# Patient Record
Sex: FEMALE | Race: WHITE | NOT HISPANIC OR LATINO | Employment: OTHER | URBAN - METROPOLITAN AREA
[De-identification: names, ages, dates, MRNs, and addresses within clinical notes are randomized per-mention and may not be internally consistent; named-entity substitution may affect disease eponyms.]

---

## 2017-05-31 ENCOUNTER — CONVERSION ENCOUNTER (OUTPATIENT)
Dept: GENERAL RADIOLOGY | Facility: HOSPITAL | Age: 62
End: 2017-05-31

## 2018-06-27 ENCOUNTER — CONVERSION ENCOUNTER (OUTPATIENT)
Dept: GENERAL RADIOLOGY | Facility: HOSPITAL | Age: 63
End: 2018-06-27

## 2019-02-22 ENCOUNTER — HOSPITAL ENCOUNTER (OUTPATIENT)
Dept: LAB | Facility: HOSPITAL | Age: 64
Discharge: HOME | End: 2019-02-22
Attending: INTERNAL MEDICINE

## 2019-02-22 LAB
25(OH)D3 SERPL-MCNC: 41.5 NG/ML (ref 30–100)
ALBUMIN SERPL-MCNC: 4.4 G/DL (ref 3.5–5)
ALBUMIN/GLOB SERPL: 1.5 {RATIO} (ref 1.4–2.6)
ALP SERPL-CCNC: 59 U/L (ref 43–160)
ALT SERPL-CCNC: 38 U/L (ref 10–40)
ANION GAP SERPL CALC-SCNC: 19 MMOL/L (ref 8–19)
AST SERPL-CCNC: 34 U/L (ref 15–50)
BASOPHILS # BLD AUTO: 0.07 10*3/UL (ref 0–0.2)
BASOPHILS NFR BLD AUTO: 0.8 % (ref 0–3)
BILIRUB SERPL-MCNC: 0.68 MG/DL (ref 0.2–1.3)
BUN SERPL-MCNC: 13 MG/DL (ref 5–25)
BUN/CREAT SERPL: 16 {RATIO} (ref 6–20)
CALCIUM SERPL-MCNC: 9.7 MG/DL (ref 8.7–10.4)
CHLORIDE SERPL-SCNC: 101 MMOL/L (ref 99–111)
CHOLEST SERPL-MCNC: 141 MG/DL (ref 107–200)
CHOLEST/HDLC SERPL: 2.7 {RATIO} (ref 3–6)
CONV ABS IMM GRAN: 0.04 10*3/UL (ref 0–0.2)
CONV CO2: 24 MMOL/L (ref 22–32)
CONV CREATININE URINE, RANDOM: 21.6 MG/DL (ref 10–300)
CONV IMMATURE GRAN: 0.4 % (ref 0–1.8)
CONV MICROALBUM.,U,RANDOM: <12 MG/L (ref 0–20)
CONV TOTAL PROTEIN: 7.4 G/DL (ref 6.3–8.2)
CREAT UR-MCNC: 0.79 MG/DL (ref 0.5–0.9)
DEPRECATED RDW RBC AUTO: 43.7 FL (ref 36.4–46.3)
EOSINOPHIL # BLD AUTO: 0.19 10*3/UL (ref 0–0.7)
EOSINOPHIL # BLD AUTO: 2.1 % (ref 0–7)
ERYTHROCYTE [DISTWIDTH] IN BLOOD BY AUTOMATED COUNT: 13.6 % (ref 11.7–14.4)
EST. AVERAGE GLUCOSE BLD GHB EST-MCNC: 166 MG/DL
GFR SERPLBLD BASED ON 1.73 SQ M-ARVRAT: >60 ML/MIN/{1.73_M2}
GLOBULIN UR ELPH-MCNC: 3 G/DL (ref 2–3.5)
GLUCOSE SERPL-MCNC: 133 MG/DL (ref 65–99)
HBA1C MFR BLD: 13.3 G/DL (ref 12–16)
HBA1C MFR BLD: 7.4 % (ref 3.5–5.7)
HCT VFR BLD AUTO: 44.4 % (ref 37–47)
HDLC SERPL-MCNC: 52 MG/DL (ref 40–60)
IRON SATN MFR SERPL: 18 % (ref 20–55)
IRON SERPL-MCNC: 87 UG/DL (ref 60–170)
LDLC SERPL CALC-MCNC: 67 MG/DL (ref 70–100)
LYMPHOCYTES # BLD AUTO: 2.38 10*3/UL (ref 1–5)
MCH RBC QN AUTO: 26.4 PG (ref 27–31)
MCHC RBC AUTO-ENTMCNC: 30 G/DL (ref 33–37)
MCV RBC AUTO: 88.1 FL (ref 81–99)
MICROALBUMIN/CREAT UR: 55.6 MG/G{CRE} (ref 0–35)
MONOCYTES # BLD AUTO: 0.62 10*3/UL (ref 0.2–1.2)
MONOCYTES NFR BLD AUTO: 6.9 % (ref 3–10)
NEUTROPHILS # BLD AUTO: 5.63 10*3/UL (ref 2–8)
NEUTROPHILS NFR BLD AUTO: 63.1 % (ref 30–85)
NRBC CBCN: 0 % (ref 0–0.7)
OSMOLALITY SERPL CALC.SUM OF ELEC: 290 MOSM/KG (ref 273–304)
PLATELET # BLD AUTO: 304 10*3/UL (ref 130–400)
PMV BLD AUTO: 11 FL (ref 9.4–12.3)
POTASSIUM SERPL-SCNC: 4.7 MMOL/L (ref 3.5–5.3)
RBC # BLD AUTO: 5.04 10*6/UL (ref 4.2–5.4)
SODIUM SERPL-SCNC: 139 MMOL/L (ref 135–147)
TIBC SERPL-MCNC: 492 UG/DL (ref 245–450)
TRANSFERRIN SERPL-MCNC: 344 MG/DL (ref 250–380)
TRIGL SERPL-MCNC: 112 MG/DL (ref 40–150)
VARIANT LYMPHS NFR BLD MANUAL: 26.7 % (ref 20–45)
VLDLC SERPL-MCNC: 22 MG/DL (ref 5–37)
WBC # BLD AUTO: 8.93 10*3/UL (ref 4.8–10.8)

## 2019-07-05 ENCOUNTER — HOSPITAL ENCOUNTER (OUTPATIENT)
Dept: GENERAL RADIOLOGY | Facility: HOSPITAL | Age: 64
Discharge: HOME | End: 2019-07-05
Attending: OBSTETRICS & GYNECOLOGY

## 2019-07-17 ENCOUNTER — HOSPITAL ENCOUNTER (OUTPATIENT)
Dept: URGENT CARE | Facility: CLINIC | Age: 64
Discharge: HOME | End: 2019-07-17

## 2019-07-26 ENCOUNTER — HOSPITAL ENCOUNTER (OUTPATIENT)
Dept: CARDIOLOGY | Facility: HOSPITAL | Age: 64
Discharge: HOME | End: 2019-07-26
Attending: INTERNAL MEDICINE

## 2019-07-30 ENCOUNTER — OFFICE VISIT CONVERTED (OUTPATIENT)
Dept: ORTHOPEDIC SURGERY | Facility: CLINIC | Age: 64
End: 2019-07-30
Attending: ORTHOPAEDIC SURGERY

## 2019-07-30 ENCOUNTER — CONVERSION ENCOUNTER (OUTPATIENT)
Dept: ORTHOPEDIC SURGERY | Facility: CLINIC | Age: 64
End: 2019-07-30

## 2019-08-01 ENCOUNTER — HOSPITAL ENCOUNTER (OUTPATIENT)
Dept: LAB | Facility: HOSPITAL | Age: 64
Discharge: HOME | End: 2019-08-01
Attending: INTERNAL MEDICINE

## 2019-08-01 LAB
ANION GAP SERPL CALC-SCNC: 21 MMOL/L (ref 8–19)
BUN SERPL-MCNC: 11 MG/DL (ref 5–25)
BUN/CREAT SERPL: 13 {RATIO} (ref 6–20)
CALCIUM SERPL-MCNC: 9.6 MG/DL (ref 8.7–10.4)
CHLORIDE SERPL-SCNC: 102 MMOL/L (ref 99–111)
CONV CO2: 23 MMOL/L (ref 22–32)
CREAT UR-MCNC: 0.86 MG/DL (ref 0.5–0.9)
EST. AVERAGE GLUCOSE BLD GHB EST-MCNC: 171 MG/DL
GFR SERPLBLD BASED ON 1.73 SQ M-ARVRAT: >60 ML/MIN/{1.73_M2}
GLUCOSE SERPL-MCNC: 105 MG/DL (ref 65–99)
HBA1C MFR BLD: 7.6 % (ref 3.5–5.7)
OSMOLALITY SERPL CALC.SUM OF ELEC: 294 MOSM/KG (ref 273–304)
POTASSIUM SERPL-SCNC: 4.4 MMOL/L (ref 3.5–5.3)
SODIUM SERPL-SCNC: 142 MMOL/L (ref 135–147)

## 2019-08-07 ENCOUNTER — HOSPITAL ENCOUNTER (OUTPATIENT)
Dept: PHYSICAL THERAPY | Facility: CLINIC | Age: 64
Setting detail: RECURRING SERIES
Discharge: HOME | End: 2019-09-12
Attending: ORTHOPAEDIC SURGERY

## 2019-08-22 ENCOUNTER — HOSPITAL ENCOUNTER (OUTPATIENT)
Dept: URGENT CARE | Facility: CLINIC | Age: 64
Discharge: HOME | End: 2019-08-22

## 2019-09-03 ENCOUNTER — OFFICE VISIT CONVERTED (OUTPATIENT)
Dept: ORTHOPEDIC SURGERY | Facility: CLINIC | Age: 64
End: 2019-09-03
Attending: ORTHOPAEDIC SURGERY

## 2019-09-04 ENCOUNTER — HOSPITAL ENCOUNTER (OUTPATIENT)
Dept: GENERAL RADIOLOGY | Facility: HOSPITAL | Age: 64
Discharge: HOME | End: 2019-09-04
Attending: ORTHOPAEDIC SURGERY

## 2019-10-17 ENCOUNTER — OFFICE VISIT CONVERTED (OUTPATIENT)
Dept: ORTHOPEDIC SURGERY | Facility: CLINIC | Age: 64
End: 2019-10-17
Attending: ORTHOPAEDIC SURGERY

## 2019-11-01 ENCOUNTER — HOSPITAL ENCOUNTER (OUTPATIENT)
Dept: LAB | Facility: HOSPITAL | Age: 64
Discharge: HOME | End: 2019-11-01
Attending: INTERNAL MEDICINE

## 2019-11-01 LAB
ANION GAP SERPL CALC-SCNC: 20 MMOL/L (ref 8–19)
BUN SERPL-MCNC: 12 MG/DL (ref 5–25)
BUN/CREAT SERPL: 13 {RATIO} (ref 6–20)
CALCIUM SERPL-MCNC: 9.4 MG/DL (ref 8.7–10.4)
CHLORIDE SERPL-SCNC: 99 MMOL/L (ref 99–111)
CONV CO2: 25 MMOL/L (ref 22–32)
CREAT UR-MCNC: 0.93 MG/DL (ref 0.5–0.9)
EST. AVERAGE GLUCOSE BLD GHB EST-MCNC: 183 MG/DL
GFR SERPLBLD BASED ON 1.73 SQ M-ARVRAT: >60 ML/MIN/{1.73_M2}
GLUCOSE SERPL-MCNC: 151 MG/DL (ref 65–99)
HBA1C MFR BLD: 8 % (ref 3.5–5.7)
OSMOLALITY SERPL CALC.SUM OF ELEC: 293 MOSM/KG (ref 273–304)
POTASSIUM SERPL-SCNC: 3.9 MMOL/L (ref 3.5–5.3)
SODIUM SERPL-SCNC: 140 MMOL/L (ref 135–147)

## 2019-11-06 ENCOUNTER — HOSPITAL ENCOUNTER (OUTPATIENT)
Dept: LAB | Facility: HOSPITAL | Age: 64
Discharge: HOME | End: 2019-11-06
Attending: INTERNAL MEDICINE

## 2019-11-06 LAB — BNP SERPL-MCNC: 283 PG/ML (ref 0–900)

## 2019-11-11 ENCOUNTER — HOSPITAL ENCOUNTER (OUTPATIENT)
Dept: CARDIOLOGY | Facility: HOSPITAL | Age: 64
Discharge: HOME | End: 2019-11-11
Attending: INTERNAL MEDICINE

## 2019-11-29 ENCOUNTER — HOSPITAL ENCOUNTER (OUTPATIENT)
Dept: URGENT CARE | Facility: CLINIC | Age: 64
Discharge: HOME | End: 2019-11-29
Attending: PHYSICIAN ASSISTANT

## 2019-12-05 ENCOUNTER — OFFICE VISIT CONVERTED (OUTPATIENT)
Dept: ORTHOPEDIC SURGERY | Facility: CLINIC | Age: 64
End: 2019-12-05
Attending: ORTHOPAEDIC SURGERY

## 2020-01-03 ENCOUNTER — OFFICE VISIT CONVERTED (OUTPATIENT)
Dept: ORTHOPEDIC SURGERY | Facility: CLINIC | Age: 65
End: 2020-01-03
Attending: PHYSICIAN ASSISTANT

## 2020-01-30 ENCOUNTER — HOSPITAL ENCOUNTER (OUTPATIENT)
Dept: LAB | Facility: HOSPITAL | Age: 65
Discharge: HOME | End: 2020-01-30
Attending: INTERNAL MEDICINE

## 2020-01-30 LAB
25(OH)D3 SERPL-MCNC: 38.2 NG/ML (ref 30–100)
ALBUMIN SERPL-MCNC: 4 G/DL (ref 3.5–5)
ALBUMIN/GLOB SERPL: 1.3 {RATIO} (ref 1.4–2.6)
ALP SERPL-CCNC: 59 U/L (ref 43–160)
ALT SERPL-CCNC: 26 U/L (ref 10–40)
ANION GAP SERPL CALC-SCNC: 20 MMOL/L (ref 8–19)
AST SERPL-CCNC: 24 U/L (ref 15–50)
BASOPHILS # BLD AUTO: 0.06 10*3/UL (ref 0–0.2)
BASOPHILS NFR BLD AUTO: 0.8 % (ref 0–3)
BILIRUB SERPL-MCNC: 0.38 MG/DL (ref 0.2–1.3)
BNP SERPL-MCNC: 228 PG/ML (ref 0–900)
BUN SERPL-MCNC: 14 MG/DL (ref 5–25)
BUN/CREAT SERPL: 15 {RATIO} (ref 6–20)
CALCIUM SERPL-MCNC: 9.4 MG/DL (ref 8.7–10.4)
CHLORIDE SERPL-SCNC: 101 MMOL/L (ref 99–111)
CHOLEST SERPL-MCNC: 146 MG/DL (ref 107–200)
CHOLEST/HDLC SERPL: 3.3 {RATIO} (ref 3–6)
CONV ABS IMM GRAN: 0.01 10*3/UL (ref 0–0.2)
CONV CO2: 24 MMOL/L (ref 22–32)
CONV IMMATURE GRAN: 0.1 % (ref 0–1.8)
CONV TOTAL PROTEIN: 7 G/DL (ref 6.3–8.2)
CREAT UR-MCNC: 0.92 MG/DL (ref 0.5–0.9)
DEPRECATED RDW RBC AUTO: 43.7 FL (ref 36.4–46.3)
EOSINOPHIL # BLD AUTO: 0.16 10*3/UL (ref 0–0.7)
EOSINOPHIL # BLD AUTO: 2.1 % (ref 0–7)
ERYTHROCYTE [DISTWIDTH] IN BLOOD BY AUTOMATED COUNT: 13.4 % (ref 11.7–14.4)
EST. AVERAGE GLUCOSE BLD GHB EST-MCNC: 157 MG/DL
GFR SERPLBLD BASED ON 1.73 SQ M-ARVRAT: >60 ML/MIN/{1.73_M2}
GLOBULIN UR ELPH-MCNC: 3 G/DL (ref 2–3.5)
GLUCOSE SERPL-MCNC: 137 MG/DL (ref 65–99)
HBA1C MFR BLD: 7.1 % (ref 3.5–5.7)
HCT VFR BLD AUTO: 41.3 % (ref 37–47)
HDLC SERPL-MCNC: 44 MG/DL (ref 40–60)
HGB BLD-MCNC: 12.8 G/DL (ref 12–16)
LDLC SERPL CALC-MCNC: 74 MG/DL (ref 70–100)
LYMPHOCYTES # BLD AUTO: 2.97 10*3/UL (ref 1–5)
LYMPHOCYTES NFR BLD AUTO: 39.1 % (ref 20–45)
MCH RBC QN AUTO: 27.6 PG (ref 27–31)
MCHC RBC AUTO-ENTMCNC: 31 G/DL (ref 33–37)
MCV RBC AUTO: 89.2 FL (ref 81–99)
MONOCYTES # BLD AUTO: 0.56 10*3/UL (ref 0.2–1.2)
MONOCYTES NFR BLD AUTO: 7.4 % (ref 3–10)
NEUTROPHILS # BLD AUTO: 3.84 10*3/UL (ref 2–8)
NEUTROPHILS NFR BLD AUTO: 50.5 % (ref 30–85)
NRBC CBCN: 0 % (ref 0–0.7)
OSMOLALITY SERPL CALC.SUM OF ELEC: 295 MOSM/KG (ref 273–304)
PLATELET # BLD AUTO: 285 10*3/UL (ref 130–400)
PMV BLD AUTO: 10.9 FL (ref 9.4–12.3)
POTASSIUM SERPL-SCNC: 4.2 MMOL/L (ref 3.5–5.3)
RBC # BLD AUTO: 4.63 10*6/UL (ref 4.2–5.4)
SODIUM SERPL-SCNC: 141 MMOL/L (ref 135–147)
T4 FREE SERPL-MCNC: 1.3 NG/DL (ref 0.9–1.8)
TRIGL SERPL-MCNC: 141 MG/DL (ref 40–150)
TSH SERPL-ACNC: 2.38 M[IU]/L (ref 0.27–4.2)
VLDLC SERPL-MCNC: 28 MG/DL (ref 5–37)
WBC # BLD AUTO: 7.6 10*3/UL (ref 4.8–10.8)

## 2020-01-31 ENCOUNTER — CONVERSION ENCOUNTER (OUTPATIENT)
Dept: ORTHOPEDIC SURGERY | Facility: CLINIC | Age: 65
End: 2020-01-31

## 2020-01-31 ENCOUNTER — OFFICE VISIT CONVERTED (OUTPATIENT)
Dept: ORTHOPEDIC SURGERY | Facility: CLINIC | Age: 65
End: 2020-01-31
Attending: PHYSICIAN ASSISTANT

## 2020-06-19 ENCOUNTER — HOSPITAL ENCOUNTER (OUTPATIENT)
Dept: LAB | Facility: HOSPITAL | Age: 65
Discharge: HOME | End: 2020-06-19
Attending: INTERNAL MEDICINE

## 2020-06-19 LAB
ANION GAP SERPL CALC-SCNC: 25 MMOL/L (ref 8–19)
BUN SERPL-MCNC: 12 MG/DL (ref 5–25)
BUN/CREAT SERPL: 12 {RATIO} (ref 6–20)
CALCIUM SERPL-MCNC: 9.6 MG/DL (ref 8.7–10.4)
CHLORIDE SERPL-SCNC: 102 MMOL/L (ref 99–111)
CONV CO2: 19 MMOL/L (ref 22–32)
CONV CREATININE URINE, RANDOM: 33.2 MG/DL (ref 10–300)
CONV MICROALBUM.,U,RANDOM: <12 MG/L (ref 0–20)
CREAT UR-MCNC: 0.97 MG/DL (ref 0.5–0.9)
EST. AVERAGE GLUCOSE BLD GHB EST-MCNC: 163 MG/DL
GFR SERPLBLD BASED ON 1.73 SQ M-ARVRAT: >60 ML/MIN/{1.73_M2}
GLUCOSE SERPL-MCNC: 153 MG/DL (ref 65–99)
HBA1C MFR BLD: 7.3 % (ref 3.5–5.7)
MICROALBUMIN/CREAT UR: 36.1 MG/G{CRE} (ref 0–35)
OSMOLALITY SERPL CALC.SUM OF ELEC: 297 MOSM/KG (ref 273–304)
POTASSIUM SERPL-SCNC: 4.2 MMOL/L (ref 3.5–5.3)
SODIUM SERPL-SCNC: 142 MMOL/L (ref 135–147)

## 2020-08-14 ENCOUNTER — HOSPITAL ENCOUNTER (OUTPATIENT)
Dept: GENERAL RADIOLOGY | Facility: HOSPITAL | Age: 65
Discharge: HOME | End: 2020-08-14
Attending: OBSTETRICS & GYNECOLOGY

## 2020-09-18 ENCOUNTER — HOSPITAL ENCOUNTER (OUTPATIENT)
Dept: LAB | Facility: HOSPITAL | Age: 65
Discharge: HOME | End: 2020-09-18
Attending: INTERNAL MEDICINE

## 2020-09-18 LAB
25(OH)D3 SERPL-MCNC: 38.5 NG/ML (ref 30–100)
ALBUMIN SERPL-MCNC: 4.2 G/DL (ref 3.5–5)
ALBUMIN/GLOB SERPL: 1.4 {RATIO} (ref 1.4–2.6)
ALP SERPL-CCNC: 63 U/L (ref 43–160)
ALT SERPL-CCNC: 30 U/L (ref 10–40)
ANION GAP SERPL CALC-SCNC: 18 MMOL/L (ref 8–19)
AST SERPL-CCNC: 29 U/L (ref 15–50)
BASOPHILS # BLD AUTO: 0.07 10*3/UL (ref 0–0.2)
BASOPHILS NFR BLD AUTO: 0.8 % (ref 0–3)
BILIRUB SERPL-MCNC: 0.35 MG/DL (ref 0.2–1.3)
BUN SERPL-MCNC: 13 MG/DL (ref 5–25)
BUN/CREAT SERPL: 12 {RATIO} (ref 6–20)
CALCIUM SERPL-MCNC: 9.8 MG/DL (ref 8.7–10.4)
CHLORIDE SERPL-SCNC: 102 MMOL/L (ref 99–111)
CHOLEST SERPL-MCNC: 172 MG/DL (ref 107–200)
CHOLEST/HDLC SERPL: 3.2 {RATIO} (ref 3–6)
CONV ABS IMM GRAN: 0.03 10*3/UL (ref 0–0.2)
CONV CO2: 24 MMOL/L (ref 22–32)
CONV IMMATURE GRAN: 0.3 % (ref 0–1.8)
CONV TOTAL PROTEIN: 7.3 G/DL (ref 6.3–8.2)
CREAT UR-MCNC: 1.06 MG/DL (ref 0.5–0.9)
DEPRECATED RDW RBC AUTO: 43.8 FL (ref 36.4–46.3)
EOSINOPHIL # BLD AUTO: 0.21 10*3/UL (ref 0–0.7)
EOSINOPHIL # BLD AUTO: 2.4 % (ref 0–7)
ERYTHROCYTE [DISTWIDTH] IN BLOOD BY AUTOMATED COUNT: 13.3 % (ref 11.7–14.4)
ERYTHROCYTE [SEDIMENTATION RATE] IN BLOOD: 8 MM/H (ref 0–30)
EST. AVERAGE GLUCOSE BLD GHB EST-MCNC: 160 MG/DL
FERRITIN SERPL-MCNC: 32 NG/ML (ref 10–200)
FOLATE SERPL-MCNC: >20 NG/ML (ref 4.8–20)
GFR SERPLBLD BASED ON 1.73 SQ M-ARVRAT: 55 ML/MIN/{1.73_M2}
GLOBULIN UR ELPH-MCNC: 3.1 G/DL (ref 2–3.5)
GLUCOSE SERPL-MCNC: 159 MG/DL (ref 65–99)
HBA1C MFR BLD: 7.2 % (ref 3.5–5.7)
HCT VFR BLD AUTO: 44.6 % (ref 37–47)
HDLC SERPL-MCNC: 54 MG/DL (ref 40–60)
HGB BLD-MCNC: 13.4 G/DL (ref 12–16)
IRON SATN MFR SERPL: 11 % (ref 20–55)
IRON SERPL-MCNC: 56 UG/DL (ref 60–170)
LDLC SERPL CALC-MCNC: 88 MG/DL (ref 70–100)
LYMPHOCYTES # BLD AUTO: 3.18 10*3/UL (ref 1–5)
LYMPHOCYTES NFR BLD AUTO: 35.7 % (ref 20–45)
MCH RBC QN AUTO: 27.1 PG (ref 27–31)
MCHC RBC AUTO-ENTMCNC: 30 G/DL (ref 33–37)
MCV RBC AUTO: 90.3 FL (ref 81–99)
MONOCYTES # BLD AUTO: 0.58 10*3/UL (ref 0.2–1.2)
MONOCYTES NFR BLD AUTO: 6.5 % (ref 3–10)
NEUTROPHILS # BLD AUTO: 4.84 10*3/UL (ref 2–8)
NEUTROPHILS NFR BLD AUTO: 54.3 % (ref 30–85)
NRBC CBCN: 0 % (ref 0–0.7)
OSMOLALITY SERPL CALC.SUM OF ELEC: 291 MOSM/KG (ref 273–304)
PLATELET # BLD AUTO: 317 10*3/UL (ref 130–400)
PMV BLD AUTO: 11 FL (ref 9.4–12.3)
POTASSIUM SERPL-SCNC: 4.5 MMOL/L (ref 3.5–5.3)
RBC # BLD AUTO: 4.94 10*6/UL (ref 4.2–5.4)
SODIUM SERPL-SCNC: 139 MMOL/L (ref 135–147)
T4 FREE SERPL-MCNC: 1.5 NG/DL (ref 0.9–1.8)
TIBC SERPL-MCNC: 502 UG/DL (ref 245–450)
TRANSFERRIN SERPL-MCNC: 351 MG/DL (ref 250–380)
TRIGL SERPL-MCNC: 151 MG/DL (ref 40–150)
TSH SERPL-ACNC: 2.05 M[IU]/L (ref 0.27–4.2)
VIT B12 SERPL-MCNC: 281 PG/ML (ref 211–911)
VLDLC SERPL-MCNC: 30 MG/DL (ref 5–37)
WBC # BLD AUTO: 8.91 10*3/UL (ref 4.8–10.8)

## 2021-02-02 ENCOUNTER — HOSPITAL ENCOUNTER (OUTPATIENT)
Dept: LAB | Facility: HOSPITAL | Age: 66
Discharge: HOME | End: 2021-02-02
Attending: INTERNAL MEDICINE

## 2021-02-02 LAB
ANION GAP SERPL CALC-SCNC: 25 MMOL/L (ref 8–19)
BUN SERPL-MCNC: 13 MG/DL (ref 5–25)
BUN/CREAT SERPL: 12 {RATIO} (ref 6–20)
CALCIUM SERPL-MCNC: 9.8 MG/DL (ref 8.7–10.4)
CHLORIDE SERPL-SCNC: 103 MMOL/L (ref 99–111)
CONV CO2: 20 MMOL/L (ref 22–32)
CREAT UR-MCNC: 1.05 MG/DL (ref 0.5–0.9)
EST. AVERAGE GLUCOSE BLD GHB EST-MCNC: 160 MG/DL
GFR SERPLBLD BASED ON 1.73 SQ M-ARVRAT: 56 ML/MIN/{1.73_M2}
GLUCOSE SERPL-MCNC: 167 MG/DL (ref 65–99)
HBA1C MFR BLD: 7.2 % (ref 3.5–5.7)
OSMOLALITY SERPL CALC.SUM OF ELEC: 302 MOSM/KG (ref 273–304)
POTASSIUM SERPL-SCNC: 4.4 MMOL/L (ref 3.5–5.3)
SODIUM SERPL-SCNC: 144 MMOL/L (ref 135–147)

## 2021-05-15 VITALS — WEIGHT: 258 LBS | HEIGHT: 61 IN | OXYGEN SATURATION: 96 % | HEART RATE: 83 BPM | BODY MASS INDEX: 48.71 KG/M2

## 2021-05-15 VITALS — BODY MASS INDEX: 48.76 KG/M2 | OXYGEN SATURATION: 94 % | HEART RATE: 85 BPM | HEIGHT: 61 IN | WEIGHT: 258.25 LBS

## 2021-05-15 VITALS — HEART RATE: 84 BPM | BODY MASS INDEX: 46.26 KG/M2 | WEIGHT: 245 LBS | HEIGHT: 61 IN | OXYGEN SATURATION: 92 %

## 2021-05-15 VITALS — OXYGEN SATURATION: 99 % | HEART RATE: 65 BPM | HEIGHT: 61 IN

## 2021-05-15 VITALS — OXYGEN SATURATION: 92 % | HEIGHT: 61 IN | HEART RATE: 84 BPM

## 2021-05-15 VITALS — HEART RATE: 76 BPM | OXYGEN SATURATION: 90 % | HEIGHT: 61 IN

## 2021-05-23 ENCOUNTER — TRANSCRIBE ORDERS (OUTPATIENT)
Dept: ADMINISTRATIVE | Facility: HOSPITAL | Age: 66
End: 2021-05-23

## 2021-05-23 DIAGNOSIS — Z12.39 SCREENING BREAST EXAMINATION: Primary | ICD-10-CM

## 2021-07-07 ENCOUNTER — LAB (OUTPATIENT)
Dept: LAB | Facility: HOSPITAL | Age: 66
End: 2021-07-07

## 2021-07-07 ENCOUNTER — TRANSCRIBE ORDERS (OUTPATIENT)
Dept: ADMINISTRATIVE | Facility: HOSPITAL | Age: 66
End: 2021-07-07

## 2021-07-07 DIAGNOSIS — E55.9 VITAMIN D DEFICIENCY DISEASE: ICD-10-CM

## 2021-07-07 DIAGNOSIS — E78.2 MIXED HYPERLIPIDEMIA: ICD-10-CM

## 2021-07-07 DIAGNOSIS — G93.32 CHRONIC FATIGUE SYNDROME: ICD-10-CM

## 2021-07-07 DIAGNOSIS — E11.9 DIABETES MELLITUS WITHOUT COMPLICATION (HCC): ICD-10-CM

## 2021-07-07 DIAGNOSIS — D50.9 IRON DEFICIENCY ANEMIA, UNSPECIFIED IRON DEFICIENCY ANEMIA TYPE: ICD-10-CM

## 2021-07-07 DIAGNOSIS — E11.9 DIABETES MELLITUS WITHOUT COMPLICATION (HCC): Primary | ICD-10-CM

## 2021-07-07 LAB
25(OH)D3 SERPL-MCNC: 41.5 NG/ML
ALBUMIN SERPL-MCNC: 4.2 G/DL (ref 3.5–5.2)
ALBUMIN/GLOB SERPL: 1.6 G/DL
ALP SERPL-CCNC: 58 U/L (ref 39–117)
ALT SERPL W P-5'-P-CCNC: 24 U/L (ref 1–33)
ANION GAP SERPL CALCULATED.3IONS-SCNC: 11.3 MMOL/L (ref 5–15)
AST SERPL-CCNC: 24 U/L (ref 1–32)
BASOPHILS # BLD AUTO: 0.03 10*3/MM3 (ref 0–0.2)
BASOPHILS NFR BLD AUTO: 0.5 % (ref 0–1.5)
BILIRUB SERPL-MCNC: 0.4 MG/DL (ref 0–1.2)
BUN SERPL-MCNC: 12 MG/DL (ref 8–23)
BUN/CREAT SERPL: 13.2 (ref 7–25)
CALCIUM SPEC-SCNC: 9.3 MG/DL (ref 8.6–10.5)
CHLORIDE SERPL-SCNC: 106 MMOL/L (ref 98–107)
CHOLEST SERPL-MCNC: 146 MG/DL (ref 0–200)
CO2 SERPL-SCNC: 22.7 MMOL/L (ref 22–29)
CREAT SERPL-MCNC: 0.91 MG/DL (ref 0.57–1)
DEPRECATED RDW RBC AUTO: 40.1 FL (ref 37–54)
EOSINOPHIL # BLD AUTO: 0.21 10*3/MM3 (ref 0–0.4)
EOSINOPHIL NFR BLD AUTO: 3.2 % (ref 0.3–6.2)
ERYTHROCYTE [DISTWIDTH] IN BLOOD BY AUTOMATED COUNT: 12.7 % (ref 12.3–15.4)
FERRITIN SERPL-MCNC: 42 NG/ML (ref 13–150)
GFR SERPL CREATININE-BSD FRML MDRD: 62 ML/MIN/1.73
GLOBULIN UR ELPH-MCNC: 2.6 GM/DL
GLUCOSE SERPL-MCNC: 112 MG/DL (ref 65–99)
HBA1C MFR BLD: 7.38 % (ref 4.8–5.6)
HCT VFR BLD AUTO: 42.2 % (ref 34–46.6)
HDLC SERPL-MCNC: 50 MG/DL (ref 40–60)
HGB BLD-MCNC: 13.6 G/DL (ref 12–15.9)
IMM GRANULOCYTES # BLD AUTO: 0.02 10*3/MM3 (ref 0–0.05)
IMM GRANULOCYTES NFR BLD AUTO: 0.3 % (ref 0–0.5)
IRON 24H UR-MRATE: 60 MCG/DL (ref 37–145)
IRON SATN MFR SERPL: 12 % (ref 20–50)
LDLC SERPL CALC-MCNC: 74 MG/DL (ref 0–100)
LDLC/HDLC SERPL: 1.41 {RATIO}
LYMPHOCYTES # BLD AUTO: 1.97 10*3/MM3 (ref 0.7–3.1)
LYMPHOCYTES NFR BLD AUTO: 30.1 % (ref 19.6–45.3)
MCH RBC QN AUTO: 28.2 PG (ref 26.6–33)
MCHC RBC AUTO-ENTMCNC: 32.2 G/DL (ref 31.5–35.7)
MCV RBC AUTO: 87.4 FL (ref 79–97)
MONOCYTES # BLD AUTO: 0.49 10*3/MM3 (ref 0.1–0.9)
MONOCYTES NFR BLD AUTO: 7.5 % (ref 5–12)
NEUTROPHILS NFR BLD AUTO: 3.82 10*3/MM3 (ref 1.7–7)
NEUTROPHILS NFR BLD AUTO: 58.4 % (ref 42.7–76)
NRBC BLD AUTO-RTO: 0 /100 WBC (ref 0–0.2)
PLATELET # BLD AUTO: 310 10*3/MM3 (ref 140–450)
PMV BLD AUTO: 10.4 FL (ref 6–12)
POTASSIUM SERPL-SCNC: 4.3 MMOL/L (ref 3.5–5.2)
PROT SERPL-MCNC: 6.8 G/DL (ref 6–8.5)
RBC # BLD AUTO: 4.83 10*6/MM3 (ref 3.77–5.28)
SODIUM SERPL-SCNC: 140 MMOL/L (ref 136–145)
T4 FREE SERPL-MCNC: 1.35 NG/DL (ref 0.93–1.7)
TIBC SERPL-MCNC: 505 MCG/DL (ref 298–536)
TRANSFERRIN SERPL-MCNC: 339 MG/DL (ref 200–360)
TRIGL SERPL-MCNC: 127 MG/DL (ref 0–150)
TSH SERPL DL<=0.05 MIU/L-ACNC: 1.72 UIU/ML (ref 0.27–4.2)
VLDLC SERPL-MCNC: 22 MG/DL (ref 5–40)
WBC # BLD AUTO: 6.54 10*3/MM3 (ref 3.4–10.8)

## 2021-07-07 PROCEDURE — 82306 VITAMIN D 25 HYDROXY: CPT

## 2021-07-07 PROCEDURE — 83540 ASSAY OF IRON: CPT

## 2021-07-07 PROCEDURE — 84443 ASSAY THYROID STIM HORMONE: CPT

## 2021-07-07 PROCEDURE — 85025 COMPLETE CBC W/AUTO DIFF WBC: CPT

## 2021-07-07 PROCEDURE — 84439 ASSAY OF FREE THYROXINE: CPT

## 2021-07-07 PROCEDURE — 84466 ASSAY OF TRANSFERRIN: CPT

## 2021-07-07 PROCEDURE — 36415 COLL VENOUS BLD VENIPUNCTURE: CPT

## 2021-07-07 PROCEDURE — 82728 ASSAY OF FERRITIN: CPT

## 2021-07-07 PROCEDURE — 80061 LIPID PANEL: CPT

## 2021-07-07 PROCEDURE — 83036 HEMOGLOBIN GLYCOSYLATED A1C: CPT

## 2021-07-07 PROCEDURE — 80053 COMPREHEN METABOLIC PANEL: CPT

## 2021-07-13 ENCOUNTER — OFFICE VISIT (OUTPATIENT)
Dept: INTERNAL MEDICINE | Facility: CLINIC | Age: 66
End: 2021-07-13

## 2021-07-13 VITALS
DIASTOLIC BLOOD PRESSURE: 83 MMHG | WEIGHT: 252 LBS | BODY MASS INDEX: 49.48 KG/M2 | TEMPERATURE: 97.8 F | SYSTOLIC BLOOD PRESSURE: 150 MMHG | HEART RATE: 65 BPM | OXYGEN SATURATION: 99 % | HEIGHT: 60 IN

## 2021-07-13 DIAGNOSIS — K21.9 GASTROESOPHAGEAL REFLUX DISEASE WITHOUT ESOPHAGITIS: ICD-10-CM

## 2021-07-13 DIAGNOSIS — E11.9 TYPE 2 DIABETES MELLITUS WITHOUT COMPLICATION, WITHOUT LONG-TERM CURRENT USE OF INSULIN (HCC): ICD-10-CM

## 2021-07-13 DIAGNOSIS — Z12.11 COLON CANCER SCREENING: ICD-10-CM

## 2021-07-13 DIAGNOSIS — I10 ESSENTIAL HYPERTENSION: Primary | ICD-10-CM

## 2021-07-13 DIAGNOSIS — E55.9 VITAMIN D DEFICIENCY: ICD-10-CM

## 2021-07-13 DIAGNOSIS — E78.2 MIXED HYPERLIPIDEMIA: ICD-10-CM

## 2021-07-13 PROBLEM — E66.01 MORBID OBESITY: Status: ACTIVE | Noted: 2021-07-13

## 2021-07-13 PROBLEM — D50.9 IRON DEFICIENCY ANEMIA: Status: ACTIVE | Noted: 2021-07-13

## 2021-07-13 PROCEDURE — 99214 OFFICE O/P EST MOD 30 MIN: CPT | Performed by: INTERNAL MEDICINE

## 2021-07-13 RX ORDER — LISINOPRIL 10 MG/1
10 TABLET ORAL DAILY
Qty: 90 TABLET | Refills: 1 | Status: SHIPPED | OUTPATIENT
Start: 2021-07-13 | End: 2021-12-13

## 2021-07-13 RX ORDER — ATENOLOL 100 MG/1
1 TABLET ORAL
COMMUNITY
Start: 2021-04-23 | End: 2021-07-21

## 2021-07-13 RX ORDER — ASPIRIN 81 MG/1
81 TABLET ORAL DAILY
COMMUNITY

## 2021-07-13 RX ORDER — NAPROXEN 250 MG/1
1 TABLET ORAL DAILY PRN
COMMUNITY
End: 2022-03-04

## 2021-07-13 RX ORDER — EMPAGLIFLOZIN 25 MG/1
1 TABLET, FILM COATED ORAL
COMMUNITY
Start: 2021-05-17 | End: 2021-10-12

## 2021-07-13 RX ORDER — MULTIVIT/IRON SULF/FOLIC ACID 15MG-0.4MG
1 TABLET ORAL
COMMUNITY

## 2021-07-13 RX ORDER — LISINOPRIL 5 MG/1
1 TABLET ORAL
COMMUNITY
Start: 2021-05-17 | End: 2021-07-13 | Stop reason: SDUPTHER

## 2021-07-13 RX ORDER — GLIPIZIDE 10 MG/1
1 TABLET ORAL 2 TIMES DAILY
COMMUNITY
Start: 2021-05-16 | End: 2021-07-21

## 2021-07-13 RX ORDER — CETIRIZINE HYDROCHLORIDE 10 MG/1
1 CAPSULE, LIQUID FILLED ORAL DAILY
COMMUNITY

## 2021-07-13 RX ORDER — PIOGLITAZONEHYDROCHLORIDE 45 MG/1
1 TABLET ORAL
COMMUNITY
Start: 2021-04-23 | End: 2021-07-21

## 2021-07-13 RX ORDER — OMEPRAZOLE 20 MG/1
1 CAPSULE, DELAYED RELEASE ORAL
COMMUNITY
Start: 2021-05-17 | End: 2021-10-12

## 2021-07-13 RX ORDER — SENNOSIDES 8.6 MG
1 CAPSULE ORAL EVERY 8 HOURS PRN
COMMUNITY

## 2021-07-13 RX ORDER — SIMVASTATIN 40 MG
1 TABLET ORAL
COMMUNITY
Start: 2021-05-17 | End: 2021-10-12

## 2021-07-13 NOTE — PROGRESS NOTES
Chief Complaint  Follow-up (labs done) and Edema (Pt. would like to discuss the Furosemide)    Subjective          Ashwini Lopez presents to [facility] INTERNAL MEDICINE     HPI:    Patient is a 66-year-old female with underlying diabetes mellitus on oral agents, hypertension, hyperlipidemia, among others, who is here for routine 3 to 4-month diabetic follow-up. Patient reports compliance with medications as listed. She reports some increased edema in his quiring about diuretics. She has labs that need to be reviewed.      Diabetes  She has type 2 diabetes mellitus. No MedicAlert identification noted. The initial diagnosis of diabetes was made 20 years ago. Hypoglycemia symptoms include headaches, nervousness/anxiousness and sleepiness. Pertinent negatives for hypoglycemia include no confusion, dizziness, hunger, mood changes, pallor, seizures, speech difficulty, sweats or tremors. Associated symptoms include fatigue, polydipsia and polyuria. Pertinent negatives for diabetes include no blurred vision, no chest pain, no foot paresthesias, no foot ulcerations, no polyphagia, no visual change, no weakness and no weight loss. Pertinent negatives for hypoglycemia complications include no blackouts, no hospitalization, no required assistance and no required glucagon injection. Symptoms are stable. Pertinent negatives for diabetic complications include no CVA, heart disease, impotence, nephropathy, peripheral neuropathy, PVD or retinopathy. Risk factors for coronary artery disease include dyslipidemia, family history, hypertension, obesity, post-menopausal, sedentary lifestyle and stress. Current diabetic treatment includes oral agent (triple therapy). She is compliant with treatment all of the time. Her weight is stable. She has not had a previous visit with a dietitian. She monitors blood glucose at home 1-2 x per day. Blood glucose monitoring compliance is good. Her home blood glucose trend is  "fluctuating minimally. Her breakfast blood glucose is taken between 8-9 am. Her breakfast blood glucose range is generally 70-90 mg/dl. Her dinner blood glucose is taken between 7-8 pm. Her dinner blood glucose range is generally 130-140 mg/dl. Her highest blood glucose is 180-200 mg/dl. She does not see a podiatrist.Eye exam is current.       Review of Systems   Constitutional: Positive for fatigue. Negative for appetite change, fever and unexpected weight loss.   HENT: Negative for congestion and ear pain.    Eyes: Negative for blurred vision.   Respiratory: Negative for cough, chest tightness, shortness of breath and wheezing.    Cardiovascular: Negative for chest pain, palpitations and leg swelling.   Gastrointestinal: Negative for abdominal pain.   Endocrine: Positive for polydipsia and polyuria. Negative for polyphagia.        This is related to her underlying diabetes as not optimally controlled.   Genitourinary: Negative for difficulty urinating, dysuria, hematuria and impotence.   Musculoskeletal: Negative for arthralgias and gait problem.   Skin: Negative for pallor and skin lesions.   Neurological: Negative for dizziness, tremors, seizures, syncope, speech difficulty, weakness, memory problem and confusion.   Psychiatric/Behavioral: Negative for self-injury and depressed mood. The patient is nervous/anxious.        Objective   Vital Signs:   /83 (BP Location: Left arm, Patient Position: Sitting, Cuff Size: Adult)   Pulse 65   Temp 97.8 °F (36.6 °C)   Ht 152.4 cm (60\")   Wt 114 kg (252 lb)   SpO2 99%   BMI 49.22 kg/m²           Physical Exam  Vitals and nursing note reviewed.   Constitutional:       General: She is not in acute distress.     Appearance: Normal appearance. She is not toxic-appearing.   HENT:      Head: Atraumatic.      Right Ear: External ear normal.      Left Ear: External ear normal.      Nose: Nose normal.      Mouth/Throat:      Mouth: Mucous membranes are moist.   Eyes:     "  General:         Right eye: No discharge.         Left eye: No discharge.      Extraocular Movements: Extraocular movements intact.      Pupils: Pupils are equal, round, and reactive to light.   Cardiovascular:      Rate and Rhythm: Normal rate and regular rhythm.      Pulses: Normal pulses.      Heart sounds: Normal heart sounds. No murmur heard.   No gallop.    Pulmonary:      Effort: Pulmonary effort is normal. No respiratory distress.      Breath sounds: No wheezing, rhonchi or rales.   Abdominal:      General: There is no distension.      Palpations: Abdomen is soft. There is no mass.      Tenderness: There is no abdominal tenderness. There is no guarding.   Musculoskeletal:         General: No swelling or tenderness.      Cervical back: No tenderness.      Right lower leg: Edema present.      Left lower leg: Edema present.      Comments: Trace pedal edema bilaterally.   Skin:     General: Skin is warm and dry.      Findings: No rash.   Neurological:      General: No focal deficit present.      Mental Status: She is alert and oriented to person, place, and time. Mental status is at baseline.      Motor: No weakness.      Gait: Gait normal.   Psychiatric:         Mood and Affect: Mood normal.         Thought Content: Thought content normal.          Result Review :   The following data was reviewed by: Savage Watts MD on 07/13/2021:                  Assessment and Plan    Diagnoses and all orders for this visit:    1. Essential hypertension (Primary)  Overview:  Blood pressure is not terribly high but certainly not adequately controlled.  She is only on 5 mg of lisinopril, I discussed with her titrating that to 10 mg daily at 7/13/2021 office visit.      2. Mixed hyperlipidemia  Overview:  LDL is 74 on moderate dose statin.  This is at goal, patient is benefiting from statin, and should continue as such.      3. Type 2 diabetes mellitus without complication, without long-term current use of insulin  "(CMS/[facility])  Overview:  A1c is 7.4 as of 7/13/2021 office visit.  Patient is on four oral agents and unfortunately is maxed out on all of them.  Dietary therapies only treatment recommended at this time.  No indication for new agents.    Orders:  -     Hemoglobin A1c; Future  -     Basic Metabolic Panel; Future    4. Gastroesophageal reflux disease without esophagitis  Overview:  Patient is reasonably stable on daily omeprazole.  She is due for a screening colonoscopy later this year so it is certainly appropriate to have upper endoscopy done at the same time due to her need for continued use of proton pump inhibitor.    Orders:  -     Ambulatory Referral to Gastroenterology    5. Vitamin D deficiency  Overview:  Vitamin D is 42 on calcium supplementation with additional D added.  No new treatment indicated, but continue same.      6. Colon cancer screening  -     Ambulatory Referral to Gastroenterology    Other orders  -     lisinopril (PRINIVIL,ZESTRIL) 10 MG tablet; Take 1 tablet by mouth Daily.  Dispense: 90 tablet; Refill: 1       VISIT 3/21:  ANNUAL PHYSICAL 2/20:  --  DM with A1C 7.8 on less than prescribed meds b/c was running low; d/w wt loss is goal here and call if low spells; to [place] as well to help with diet (micro-alb neg 1/17)...7.3 is good, but goal is 6.8 given age...7.4 \" b/c of my foot and I couldn't exercise\"; will see what Jardiance will cost and try to wean glipizide...6.8 is good drop and is down to 2/1 of glipizide, so increase jardiance now and try 1/1 or 2/0 if drops again...7.7 despite increased Jardaince, so needs 2/1 again and/or get some wt off; agree with new monitor as well...7.2 and rec stay on this dose and diet please (d/w reason for wanting to get off glipizide)...7.4...is up due to being off feet for 6 weeks=8.0, so will try 45 mg actos if no worse swelling...7.1 and d/w yes, she can play with glipizide since having lows...7.3 in 6/20 is fine since only 1/2 dose glipizide now=ditto " 9/20 = 7.2---> 7.2 is steady 3/21. (TSH neg 9/20).  OPTHO neg 2/21; MICROALB=  --  HTN remains well controlled. 3/21.  ? EWW2h=69% in 3/21 = no concerns yet.  --  LIPIDS at goal with LDL 69...67---> 88 in 9/20.  SPECT 7/16 with EF 50% and no ischemia (FH CHF=B MI at age 41).  --  FE DEF ANEMIA is up 11.5 to 12.4 and d/w stay on same MVI as of 5/17 OV...13.1 with lowish iron, stay on MVI...stable=defer a while after 2/19 OV---> all labs stable as of 9/20 = stay on MVI.  --  ELEVATED LFTS=wnl as of 1/17... ditto.  GERD w/o dysphagia on PPI and I rec trial of qod; (s/p prior dilation)  --  VIT D DEF=fine 9/20.  BMD neg per Dr Cuellar age 61 and pt wants next age 66.  --  S/P FALL 10/17 = fell off stool, to [facility], had HCT, balance and paresthesia's since; exam non-focal, neg FTN, neg rhomberg; will get dopplers and review the cat scan, but o/w no tx rec...Dopplers 2/18 were fine; will send to ENT due to sxs with turning head...?  S/P FALL 7/19 = RLE major swelling, had VELE=neg; saw Ortho and they sent to PT...fell again, had MRI=R tibeal plateau fx and was in brace for 6 weeks per Dr Krishnan; has f/u with him before PT to resume; I d/w try replace aleve with tylenol arthritis.  --  MORBID DAXXAMT=186 is stuck (TSH neg 2/20).  --  --  MMG per Dr Mac=q yr as of 7/21.  COLON 10/11...wnl...10 yrs rec=neg 1st degree relative colon ca.  Pneumo #1 '01, Prevnar '16; Shingrix x1/Hep A pending; COVID # 1 this week 3/21.  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    Follow Up   No follow-ups on file.  Patient was given instructions and counseling regarding her condition or for health maintenance advice. Please see specific information pulled into the AVS if appropriate.

## 2021-07-21 ENCOUNTER — CLINICAL SUPPORT (OUTPATIENT)
Dept: GASTROENTEROLOGY | Facility: CLINIC | Age: 66
End: 2021-07-21

## 2021-07-21 ENCOUNTER — TELEPHONE (OUTPATIENT)
Dept: GASTROENTEROLOGY | Facility: CLINIC | Age: 66
End: 2021-07-21

## 2021-07-21 ENCOUNTER — PREP FOR SURGERY (OUTPATIENT)
Dept: OTHER | Facility: HOSPITAL | Age: 66
End: 2021-07-21

## 2021-07-21 DIAGNOSIS — Z87.19 HISTORY OF GASTROESOPHAGEAL REFLUX (GERD): Primary | ICD-10-CM

## 2021-07-21 DIAGNOSIS — Z12.11 SCREENING FOR COLON CANCER: ICD-10-CM

## 2021-07-21 RX ORDER — SODIUM, POTASSIUM,MAG SULFATES 17.5-3.13G
2 SOLUTION, RECONSTITUTED, ORAL ORAL EVERY 12 HOURS
Qty: 177 ML | Refills: 0 | Status: CANCELLED | OUTPATIENT
Start: 2021-07-21

## 2021-07-21 RX ORDER — PIOGLITAZONEHYDROCHLORIDE 45 MG/1
TABLET ORAL
Qty: 90 TABLET | Refills: 0 | Status: SHIPPED | OUTPATIENT
Start: 2021-07-21 | End: 2021-10-12

## 2021-07-21 RX ORDER — GLIPIZIDE 10 MG/1
TABLET ORAL
Qty: 360 TABLET | Refills: 0 | Status: SHIPPED | OUTPATIENT
Start: 2021-07-21 | End: 2021-10-13 | Stop reason: SDUPTHER

## 2021-07-21 RX ORDER — ATENOLOL 100 MG/1
TABLET ORAL
Qty: 90 TABLET | Refills: 0 | Status: SHIPPED | OUTPATIENT
Start: 2021-07-21 | End: 2021-10-13 | Stop reason: SDUPTHER

## 2021-07-21 RX ORDER — SODIUM, POTASSIUM,MAG SULFATES 17.5-3.13G
2 SOLUTION, RECONSTITUTED, ORAL ORAL TAKE AS DIRECTED
Qty: 177 ML | Refills: 0 | Status: ON HOLD | OUTPATIENT
Start: 2021-07-21 | End: 2022-01-21

## 2021-07-22 ENCOUNTER — TRANSCRIBE ORDERS (OUTPATIENT)
Dept: ADMINISTRATIVE | Facility: HOSPITAL | Age: 66
End: 2021-07-22

## 2021-07-22 DIAGNOSIS — Z78.0 POSTMENOPAUSAL: Primary | ICD-10-CM

## 2021-08-02 ENCOUNTER — PREP FOR SURGERY (OUTPATIENT)
Dept: OTHER | Facility: HOSPITAL | Age: 66
End: 2021-08-02

## 2021-08-02 DIAGNOSIS — Z12.11 SCREENING FOR COLON CANCER: Primary | ICD-10-CM

## 2021-08-02 PROBLEM — Z87.19 HISTORY OF GASTROESOPHAGEAL REFLUX (GERD): Status: ACTIVE | Noted: 2021-08-02

## 2021-09-10 ENCOUNTER — APPOINTMENT (OUTPATIENT)
Dept: MAMMOGRAPHY | Facility: HOSPITAL | Age: 66
End: 2021-09-10

## 2021-09-10 ENCOUNTER — HOSPITAL ENCOUNTER (OUTPATIENT)
Dept: MAMMOGRAPHY | Facility: HOSPITAL | Age: 66
Discharge: HOME | End: 2021-09-10
Admitting: OBSTETRICS & GYNECOLOGY

## 2021-09-10 DIAGNOSIS — Z12.39 SCREENING BREAST EXAMINATION: ICD-10-CM

## 2021-09-10 PROCEDURE — 77067 SCR MAMMO BI INCL CAD: CPT

## 2021-09-10 PROCEDURE — 77063 BREAST TOMOSYNTHESIS BI: CPT

## 2021-09-28 ENCOUNTER — TELEPHONE (OUTPATIENT)
Dept: GASTROENTEROLOGY | Facility: CLINIC | Age: 66
End: 2021-09-28

## 2021-09-28 NOTE — TELEPHONE ENCOUNTER
Patient called to cancel her scope that was scheduled for 10/15/21 due to covid concerns. Wants to have procedure done in the Spring.

## 2021-10-12 RX ORDER — SIMVASTATIN 40 MG
TABLET ORAL
Qty: 90 TABLET | Refills: 0 | Status: SHIPPED | OUTPATIENT
Start: 2021-10-12 | End: 2021-12-13

## 2021-10-12 RX ORDER — OMEPRAZOLE 20 MG/1
CAPSULE, DELAYED RELEASE ORAL
Qty: 90 CAPSULE | Refills: 0 | Status: SHIPPED | OUTPATIENT
Start: 2021-10-12 | End: 2021-12-13

## 2021-10-12 RX ORDER — PIOGLITAZONEHYDROCHLORIDE 45 MG/1
TABLET ORAL
Qty: 90 TABLET | Refills: 0 | Status: SHIPPED | OUTPATIENT
Start: 2021-10-12 | End: 2021-12-13

## 2021-10-12 RX ORDER — EMPAGLIFLOZIN 25 MG/1
TABLET, FILM COATED ORAL
Qty: 90 TABLET | Refills: 0 | Status: SHIPPED | OUTPATIENT
Start: 2021-10-12 | End: 2021-12-13

## 2021-10-13 RX ORDER — GLIPIZIDE 10 MG/1
20 TABLET ORAL 2 TIMES DAILY
Qty: 360 TABLET | Refills: 0 | Status: SHIPPED | OUTPATIENT
Start: 2021-10-13 | End: 2021-12-13

## 2021-10-13 RX ORDER — ATENOLOL 100 MG/1
100 TABLET ORAL DAILY
Qty: 90 TABLET | Refills: 0 | Status: SHIPPED | OUTPATIENT
Start: 2021-10-13 | End: 2021-12-13

## 2021-10-20 ENCOUNTER — LAB (OUTPATIENT)
Dept: LAB | Facility: HOSPITAL | Age: 66
End: 2021-10-20

## 2021-10-20 DIAGNOSIS — E11.9 TYPE 2 DIABETES MELLITUS WITHOUT COMPLICATION, WITHOUT LONG-TERM CURRENT USE OF INSULIN (HCC): ICD-10-CM

## 2021-10-20 LAB
ANION GAP SERPL CALCULATED.3IONS-SCNC: 11.1 MMOL/L (ref 5–15)
BUN SERPL-MCNC: 13 MG/DL (ref 8–23)
BUN/CREAT SERPL: 13.5 (ref 7–25)
CALCIUM SPEC-SCNC: 9.1 MG/DL (ref 8.6–10.5)
CHLORIDE SERPL-SCNC: 102 MMOL/L (ref 98–107)
CO2 SERPL-SCNC: 24.9 MMOL/L (ref 22–29)
CREAT SERPL-MCNC: 0.96 MG/DL (ref 0.57–1)
GFR SERPL CREATININE-BSD FRML MDRD: 58 ML/MIN/1.73
GLUCOSE SERPL-MCNC: 138 MG/DL (ref 65–99)
HBA1C MFR BLD: 7.27 % (ref 4.8–5.6)
POTASSIUM SERPL-SCNC: 3.6 MMOL/L (ref 3.5–5.2)
SODIUM SERPL-SCNC: 138 MMOL/L (ref 136–145)

## 2021-10-20 PROCEDURE — 36415 COLL VENOUS BLD VENIPUNCTURE: CPT

## 2021-10-20 PROCEDURE — 80048 BASIC METABOLIC PNL TOTAL CA: CPT

## 2021-10-20 PROCEDURE — 83036 HEMOGLOBIN GLYCOSYLATED A1C: CPT

## 2021-10-24 PROBLEM — E11.9 TYPE 2 DIABETES MELLITUS WITHOUT COMPLICATION, WITHOUT LONG-TERM CURRENT USE OF INSULIN: Status: ACTIVE | Noted: 2021-10-24

## 2021-10-24 PROBLEM — K21.9 GASTROESOPHAGEAL REFLUX DISEASE WITHOUT ESOPHAGITIS: Status: ACTIVE | Noted: 2021-10-24

## 2021-10-24 NOTE — PROGRESS NOTES
Chief Complaint  Follow-up (3 month), Diabetes, and Hypertension    Subjective          Ashwini Lopez presents to [facility] INTERNAL MEDICINE     HPI:    Patient is a 66-year-old female with underlying diabetes mellitus on oral agents, hypertension, hyperlipidemia, among others, who is here 10/21 for routine 3-month diabetic follow-up. She has labs that need to be reviewed.      Diabetes  She has type 2 diabetes mellitus. No MedicAlert identification noted. The initial diagnosis of diabetes was made 20 years ago. Hypoglycemia symptoms include headaches, nervousness/anxiousness and sleepiness. Pertinent negatives for hypoglycemia include no confusion, dizziness, hunger, mood changes, pallor, seizures, speech difficulty, sweats or tremors. Associated symptoms include fatigue, polydipsia and polyuria. Pertinent negatives for diabetes include no blurred vision, no chest pain, no foot paresthesias, no foot ulcerations, no polyphagia, no visual change, no weakness and no weight loss. Pertinent negatives for hypoglycemia complications include no blackouts, no hospitalization, no required assistance and no required glucagon injection. Symptoms are stable. Pertinent negatives for diabetic complications include no CVA, heart disease, impotence, nephropathy, peripheral neuropathy, PVD or retinopathy. Risk factors for coronary artery disease include dyslipidemia, family history, hypertension, obesity, post-menopausal, sedentary lifestyle and stress. Current diabetic treatment includes oral agent (triple therapy). She is compliant with treatment all of the time. Her weight is stable. She has not had a previous visit with a dietitian. She monitors blood glucose at home 1-2 x per day. Blood glucose monitoring compliance is good. Her home blood glucose trend is fluctuating minimally. Her breakfast blood glucose is taken between 8-9 am. Her breakfast blood glucose range is generally 70-90 mg/dl. Her dinner blood  "glucose is taken between 7-8 pm. Her dinner blood glucose range is generally 130-140 mg/dl. She does not see a podiatrist.Eye exam is current.       Review of Systems   Constitutional: Positive for fatigue. Negative for appetite change, fever and unexpected weight loss.   HENT: Negative for congestion and ear pain.    Eyes: Negative for blurred vision.   Respiratory: Negative for cough, chest tightness, shortness of breath and wheezing.    Cardiovascular: Negative for chest pain, palpitations and leg swelling.   Gastrointestinal: Negative for abdominal pain.   Endocrine: Positive for polydipsia and polyuria. Negative for polyphagia.        This is related to her underlying diabetes as not optimally controlled.   Genitourinary: Negative for difficulty urinating, dysuria, hematuria and impotence.   Musculoskeletal: Negative for arthralgias and gait problem.   Skin: Negative for pallor and skin lesions.   Neurological: Negative for dizziness, tremors, seizures, syncope, speech difficulty, weakness, memory problem and confusion.   Psychiatric/Behavioral: Negative for self-injury and depressed mood. The patient is nervous/anxious.        Objective   Vital Signs:   /75 (BP Location: Left arm, Patient Position: Sitting, Cuff Size: Large Adult)   Pulse 66   Temp 97 °F (36.1 °C) (Temporal)   Ht 152.4 cm (60\")   Wt 115 kg (252 lb 12.8 oz)   SpO2 100%   BMI 49.37 kg/m²           Physical Exam  Vitals and nursing note reviewed.   Constitutional:       General: She is not in acute distress.     Appearance: Normal appearance. She is not toxic-appearing.   HENT:      Head: Atraumatic.      Right Ear: External ear normal.      Left Ear: External ear normal.      Nose: Nose normal.      Mouth/Throat:      Mouth: Mucous membranes are moist.   Eyes:      General:         Right eye: No discharge.         Left eye: No discharge.      Extraocular Movements: Extraocular movements intact.      Pupils: Pupils are equal, round, " and reactive to light.   Cardiovascular:      Rate and Rhythm: Normal rate and regular rhythm.      Pulses: Normal pulses.      Heart sounds: Normal heart sounds. No murmur heard.  No gallop.    Pulmonary:      Effort: Pulmonary effort is normal. No respiratory distress.      Breath sounds: No wheezing, rhonchi or rales.   Abdominal:      General: There is no distension.      Palpations: Abdomen is soft. There is no mass.      Tenderness: There is no abdominal tenderness. There is no guarding.   Musculoskeletal:         General: No swelling or tenderness.      Cervical back: No tenderness.      Right lower leg: Edema present.      Left lower leg: Edema present.      Comments: Trace pedal edema bilaterally.   Skin:     General: Skin is warm and dry.      Findings: No rash.   Neurological:      General: No focal deficit present.      Mental Status: She is alert and oriented to person, place, and time. Mental status is at baseline.      Motor: No weakness.      Gait: Gait normal.   Psychiatric:         Mood and Affect: Mood normal.         Thought Content: Thought content normal.          Result Review :   The following data was reviewed by: Savage Watts MD on 07/13/2021:                  Assessment and Plan    Diagnoses and all orders for this visit:    1. Essential hypertension (Primary)  Overview:  Blood pressure is improved as of 10/21 office visit, we increased her lisinopril 3 months ago.  She is stable on low-dose lisinopril and max dose Tenormin, so continue same.    Orders:  -     Comprehensive Metabolic Panel; Future    2. Mixed hyperlipidemia  Overview:  LDL was 74 in 7/21, that is goal for her.  She is stable to continue with moderate dose simvastatin.  Repeat labs after the new year.    Orders:  -     Lipid Panel; Future    3. Type 2 diabetes mellitus without complication, without long-term current use of insulin (HCC)  Overview:  A1c is 7.3 as of 10/21 office visit.  Patient is on four oral agents and  "unfortunately is maxed out on all of them.  Dietary therapies only treatment recommended at this time.  No indication for new agents.    Orders:  -     Hemoglobin A1c; Future  -     Microalbumin / Creatinine Urine Ratio - Urine, Clean Catch; Future    4. Gastroesophageal reflux disease without esophagitis  Overview:  Patient with no dysphagia or persistent dyspepsia on chronic PPI.  Stable to continue low-dose omeprazole.      5. Vitamin D deficiency  Overview:  Vitamin D was 42 in 7/21 on calcium supplementation with additional D added.  No new treatment indicated, but continue same, and will repeat on return to office after the new year.    Orders:  -     Vitamin D 1,25 Dihydroxy; Future    6. Morbid obesity (HCC)  Overview:  BMI is stable as of 10/21 office visit.  Patient is aware of conservative dietary restrictions that are needed, and activity as tolerated.    (TSH was normal 7/21).      7. Stage 3a chronic kidney disease (HCC)  Overview:  GFR is 58 as of 10/21 office visit.  Patient is hovering in the upper 50s to lower 60s, that certainly very reasonable, continue to monitor.         --  --  OLDER NOTES:  VISIT 3/21:  ANNUAL PHYSICAL 2/20:  --  DM with A1C 7.8 on less than prescribed meds b/c was running low; d/w wt loss is goal here and call if low spells; to [place] as well to help with diet (micro-alb neg 1/17)...7.3 is good, but goal is 6.8 given age...7.4 \" b/c of my foot and I couldn't exercise\"; will see what Jardiance will cost and try to wean glipizide...6.8 is good drop and is down to 2/1 of glipizide, so increase jardiance now and try 1/1 or 2/0 if drops again...7.7 despite increased Jardaince, so needs 2/1 again and/or get some wt off; agree with new monitor as well...7.2 and rec stay on this dose and diet please (d/w reason for wanting to get off glipizide)...7.4...is up due to being off feet for 6 weeks=8.0, so will try 45 mg actos if no worse swelling...7.1 and d/w yes, she can play with glipizide " since having lows...7.3 in 6/20 is fine since only 1/2 dose glipizide now=ditto 9/20 = 7.2---> 7.2 is steady 3/21. (TSH neg 9/20).  OPTHO neg 2/21; MICROALB=  --  HTN remains well controlled. 3/21.  ? KYJ3c=95% in 3/21 = no concerns yet.  --  LIPIDS at goal with LDL 69...67---> 88 in 9/20.  SPECT 7/16 with EF 50% and no ischemia (FH CHF=B MI at age 41).  --  FE DEF ANEMIA is up 11.5 to 12.4 and d/w stay on same MVI as of 5/17 OV...13.1 with lowish iron, stay on MVI...stable=defer a while after 2/19 OV---> all labs stable as of 9/20 = stay on MVI.  --  ELEVATED LFTS=wnl as of 1/17... ditto.  GERD w/o dysphagia on PPI and I rec trial of qod; (s/p prior dilation)  --  VIT D DEF=fine 9/20.  BMD neg per Dr Cuellar age 61 and she is scheduled for 1 soon as of her 10/21 office visit, discussed with patient to call us or the GYN for results.  --  S/P FALL 10/17 = fell off stool, to [facility], had HCT, balance and paresthesia's since; exam non-focal, neg FTN, neg rhomberg; will get dopplers and review the cat scan, but o/w no tx rec...Dopplers 2/18 were fine; will send to ENT due to sxs with turning head...?  S/P FALL 7/19 = RLE major swelling, had VELE=neg; saw Ortho and they sent to PT...fell again, had MRI=R tibeal plateau fx and was in brace for 6 weeks per Dr Krishnan; has f/u with him before PT to resume; I d/w try replace aleve with tylenol arthritis.  --  MORBID RILYXGV=845 is stuck (TSH neg 2/20).  --  --  MMG 9/10/2021 per Dr Mac.  COLON 10/11...wnl...10 yrs rec=neg 1st degree relative colon ca.  Pneumo #1 '01, Prevnar '16; Shingrix x1/Hep A pending;   COVID x3 with Pfizer, last was 9/24/2021.  Flu shot for the 2021 season completed 10/8/2021 at [facility].  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    Follow Up   Return in about 4 months (around 2/26/2022).  Patient was given instructions and counseling regarding her condition or for health maintenance advice. Please see  specific information pulled into the AVS if appropriate.

## 2021-10-26 ENCOUNTER — OFFICE VISIT (OUTPATIENT)
Dept: INTERNAL MEDICINE | Facility: CLINIC | Age: 66
End: 2021-10-26

## 2021-10-26 VITALS
BODY MASS INDEX: 49.63 KG/M2 | SYSTOLIC BLOOD PRESSURE: 111 MMHG | HEIGHT: 60 IN | TEMPERATURE: 97 F | DIASTOLIC BLOOD PRESSURE: 75 MMHG | WEIGHT: 252.8 LBS | HEART RATE: 66 BPM | OXYGEN SATURATION: 100 %

## 2021-10-26 DIAGNOSIS — I10 ESSENTIAL HYPERTENSION: Primary | ICD-10-CM

## 2021-10-26 DIAGNOSIS — E66.01 MORBID OBESITY (HCC): ICD-10-CM

## 2021-10-26 DIAGNOSIS — N18.31 STAGE 3A CHRONIC KIDNEY DISEASE (HCC): ICD-10-CM

## 2021-10-26 DIAGNOSIS — E11.9 TYPE 2 DIABETES MELLITUS WITHOUT COMPLICATION, WITHOUT LONG-TERM CURRENT USE OF INSULIN (HCC): ICD-10-CM

## 2021-10-26 DIAGNOSIS — K21.9 GASTROESOPHAGEAL REFLUX DISEASE WITHOUT ESOPHAGITIS: ICD-10-CM

## 2021-10-26 DIAGNOSIS — E55.9 VITAMIN D DEFICIENCY: ICD-10-CM

## 2021-10-26 DIAGNOSIS — E78.2 MIXED HYPERLIPIDEMIA: ICD-10-CM

## 2021-10-26 PROCEDURE — 99214 OFFICE O/P EST MOD 30 MIN: CPT | Performed by: INTERNAL MEDICINE

## 2021-10-26 RX ORDER — LISINOPRIL 5 MG/1
10 TABLET ORAL
COMMUNITY
Start: 2021-08-03 | End: 2021-10-26

## 2021-10-29 PROBLEM — Z87.19 HISTORY OF GASTROESOPHAGEAL REFLUX (GERD): Status: ACTIVE | Noted: 2021-08-02

## 2021-11-05 ENCOUNTER — TELEPHONE (OUTPATIENT)
Dept: OBSTETRICS AND GYNECOLOGY | Facility: CLINIC | Age: 66
End: 2021-11-05

## 2021-11-05 ENCOUNTER — HOSPITAL ENCOUNTER (OUTPATIENT)
Dept: BONE DENSITY | Facility: HOSPITAL | Age: 66
Discharge: HOME | End: 2021-11-05
Admitting: OBSTETRICS & GYNECOLOGY

## 2021-11-05 DIAGNOSIS — Z78.0 POSTMENOPAUSAL: ICD-10-CM

## 2021-11-05 PROCEDURE — 77080 DXA BONE DENSITY AXIAL: CPT

## 2021-11-05 NOTE — TELEPHONE ENCOUNTER
----- Message from TAMERA Lauren sent at 11/5/2021 11:49 AM EDT -----  Please discuss results with pt. Needs to discuss with her pcp for mgmt. Thanks

## 2021-11-29 RX ORDER — FLUCONAZOLE 150 MG/1
TABLET ORAL
Qty: 12 TABLET | Refills: 0 | Status: SHIPPED | OUTPATIENT
Start: 2021-11-29 | End: 2022-03-04 | Stop reason: SDUPTHER

## 2021-12-13 RX ORDER — SIMVASTATIN 40 MG
TABLET ORAL
Qty: 90 TABLET | Refills: 0 | Status: SHIPPED | OUTPATIENT
Start: 2021-12-13 | End: 2022-03-04 | Stop reason: SDUPTHER

## 2021-12-13 RX ORDER — ATENOLOL 100 MG/1
TABLET ORAL
Qty: 90 TABLET | Refills: 0 | Status: SHIPPED | OUTPATIENT
Start: 2021-12-13 | End: 2022-03-04 | Stop reason: SDUPTHER

## 2021-12-13 RX ORDER — GLIPIZIDE 10 MG/1
TABLET ORAL
Qty: 360 TABLET | Refills: 0 | Status: SHIPPED | OUTPATIENT
Start: 2021-12-13 | End: 2022-01-18

## 2021-12-13 RX ORDER — PIOGLITAZONEHYDROCHLORIDE 45 MG/1
TABLET ORAL
Qty: 90 TABLET | Refills: 0 | Status: SHIPPED | OUTPATIENT
Start: 2021-12-13 | End: 2022-03-04 | Stop reason: SDUPTHER

## 2021-12-13 RX ORDER — EMPAGLIFLOZIN 25 MG/1
TABLET, FILM COATED ORAL
Qty: 90 TABLET | Refills: 0 | Status: SHIPPED | OUTPATIENT
Start: 2021-12-13 | End: 2022-03-04 | Stop reason: SDUPTHER

## 2021-12-13 RX ORDER — LISINOPRIL 10 MG/1
TABLET ORAL
Qty: 90 TABLET | Refills: 0 | Status: SHIPPED | OUTPATIENT
Start: 2021-12-13 | End: 2022-03-04 | Stop reason: SDUPTHER

## 2021-12-13 RX ORDER — OMEPRAZOLE 20 MG/1
CAPSULE, DELAYED RELEASE ORAL
Qty: 90 CAPSULE | Refills: 0 | Status: SHIPPED | OUTPATIENT
Start: 2021-12-13 | End: 2022-03-04 | Stop reason: SDUPTHER

## 2022-01-14 ENCOUNTER — TELEPHONE (OUTPATIENT)
Dept: GASTROENTEROLOGY | Facility: CLINIC | Age: 67
End: 2022-01-14

## 2022-01-14 NOTE — TELEPHONE ENCOUNTER
I have called patient and left a detailed message for an upcoming procedure including date, time and a good callback number for any questions.     Mychart reminder also sent.

## 2022-01-18 RX ORDER — GLIPIZIDE 10 MG/1
10 TABLET ORAL
COMMUNITY
End: 2022-03-04 | Stop reason: SDUPTHER

## 2022-01-18 NOTE — PRE-PROCEDURE INSTRUCTIONS
Pt. Instructed on laxative and skin prep, pre-op meds, clear liquid diet. Ok to take Tylenol, Atenolol, Zyrtec, Omeprazole, a.m. of procedure.      Fully vaccinated

## 2022-01-21 ENCOUNTER — ANESTHESIA EVENT (OUTPATIENT)
Dept: GASTROENTEROLOGY | Facility: HOSPITAL | Age: 67
End: 2022-01-21

## 2022-01-21 ENCOUNTER — ANESTHESIA (OUTPATIENT)
Dept: GASTROENTEROLOGY | Facility: HOSPITAL | Age: 67
End: 2022-01-21

## 2022-01-21 ENCOUNTER — HOSPITAL ENCOUNTER (OUTPATIENT)
Facility: HOSPITAL | Age: 67
Setting detail: HOSPITAL OUTPATIENT SURGERY
Discharge: HOME | End: 2022-01-21
Attending: INTERNAL MEDICINE | Admitting: INTERNAL MEDICINE

## 2022-01-21 VITALS
OXYGEN SATURATION: 97 % | BODY MASS INDEX: 47.62 KG/M2 | HEIGHT: 61 IN | DIASTOLIC BLOOD PRESSURE: 56 MMHG | TEMPERATURE: 97 F | WEIGHT: 252.21 LBS | RESPIRATION RATE: 21 BRPM | SYSTOLIC BLOOD PRESSURE: 111 MMHG | HEART RATE: 64 BPM

## 2022-01-21 DIAGNOSIS — Z12.11 SCREENING FOR COLON CANCER: ICD-10-CM

## 2022-01-21 DIAGNOSIS — Z87.19 HISTORY OF GASTROESOPHAGEAL REFLUX (GERD): ICD-10-CM

## 2022-01-21 LAB — GLUCOSE BLDC GLUCOMTR-MCNC: 166 MG/DL (ref 70–99)

## 2022-01-21 PROCEDURE — 88305 TISSUE EXAM BY PATHOLOGIST: CPT | Performed by: INTERNAL MEDICINE

## 2022-01-21 PROCEDURE — 25010000002 PROPOFOL 10 MG/ML EMULSION: Performed by: NURSE ANESTHETIST, CERTIFIED REGISTERED

## 2022-01-21 PROCEDURE — 45385 COLONOSCOPY W/LESION REMOVAL: CPT | Performed by: INTERNAL MEDICINE

## 2022-01-21 PROCEDURE — 82962 GLUCOSE BLOOD TEST: CPT

## 2022-01-21 PROCEDURE — 43239 EGD BIOPSY SINGLE/MULTIPLE: CPT | Performed by: INTERNAL MEDICINE

## 2022-01-21 RX ORDER — SODIUM CHLORIDE, SODIUM LACTATE, POTASSIUM CHLORIDE, CALCIUM CHLORIDE 600; 310; 30; 20 MG/100ML; MG/100ML; MG/100ML; MG/100ML
30 INJECTION, SOLUTION INTRAVENOUS CONTINUOUS
Status: DISCONTINUED | OUTPATIENT
Start: 2022-01-21 | End: 2022-01-21 | Stop reason: HOSPADM

## 2022-01-21 RX ORDER — SODIUM CHLORIDE 0.9 % (FLUSH) 0.9 %
10 SYRINGE (ML) INJECTION AS NEEDED
Status: DISCONTINUED | OUTPATIENT
Start: 2022-01-21 | End: 2022-01-21 | Stop reason: HOSPADM

## 2022-01-21 RX ORDER — LIDOCAINE HYDROCHLORIDE 20 MG/ML
INJECTION, SOLUTION INFILTRATION; PERINEURAL AS NEEDED
Status: DISCONTINUED | OUTPATIENT
Start: 2022-01-21 | End: 2022-01-21 | Stop reason: SURG

## 2022-01-21 RX ORDER — SODIUM CHLORIDE, SODIUM LACTATE, POTASSIUM CHLORIDE, CALCIUM CHLORIDE 600; 310; 30; 20 MG/100ML; MG/100ML; MG/100ML; MG/100ML
1000 INJECTION, SOLUTION INTRAVENOUS CONTINUOUS
Status: DISCONTINUED | OUTPATIENT
Start: 2022-01-21 | End: 2022-01-21 | Stop reason: HOSPADM

## 2022-01-21 RX ORDER — PROPOFOL 10 MG/ML
VIAL (ML) INTRAVENOUS AS NEEDED
Status: DISCONTINUED | OUTPATIENT
Start: 2022-01-21 | End: 2022-01-21 | Stop reason: SURG

## 2022-01-21 RX ADMIN — PROPOFOL 333 MCG/KG/MIN: 10 INJECTION, EMULSION INTRAVENOUS at 07:28

## 2022-01-21 RX ADMIN — LIDOCAINE HYDROCHLORIDE 30 MG: 20 INJECTION, SOLUTION INFILTRATION; PERINEURAL at 07:28

## 2022-01-21 RX ADMIN — PROPOFOL 30 MG: 10 INJECTION, EMULSION INTRAVENOUS at 07:28

## 2022-01-21 RX ADMIN — SODIUM CHLORIDE, POTASSIUM CHLORIDE, SODIUM LACTATE AND CALCIUM CHLORIDE 1000 ML: 600; 310; 30; 20 INJECTION, SOLUTION INTRAVENOUS at 06:37

## 2022-01-21 NOTE — ANESTHESIA PREPROCEDURE EVALUATION
Anesthesia Evaluation                  Airway   Mallampati: II  Dental      Pulmonary    Cardiovascular     (+) hypertension, hyperlipidemia,       Neuro/Psych  GI/Hepatic/Renal/Endo    (+) obesity, morbid obesity, GERD,  renal disease, diabetes mellitus type 2,     Musculoskeletal     Abdominal    Substance History      OB/GYN          Other                        Anesthesia Plan    ASA 3     general   (Total IV Anesthesia    Patient understands anesthesia not responsible for dental damage.  )  intravenous induction     Anesthetic plan, all risks, benefits, and alternatives have been provided, discussed and informed consent has been obtained with: patient.    Plan discussed with CRNA.        CODE STATUS:

## 2022-01-21 NOTE — H&P
Pre Procedure History & Physical    Chief Complaint:   GERD, screening colonoscopy    Subjective     HPI:   67 yo F here for eval of GERD, screening colonoscopy.    Past Medical History:   Past Medical History:   Diagnosis Date   • Chronic fatigue, unspecified    • Diverticulitis    • Essential (primary) hypertension    • GERD (gastroesophageal reflux disease)    • High cholesterol    • Iron deficiency anemia, unspecified    • Mixed hyperlipidemia    • Morbid (severe) obesity due to excess calories (HCC)    • Seasonal allergies    • Type 2 diabetes mellitus without complication (HCC)    • Vitamin D deficiency, unspecified        Past Surgical History:  Past Surgical History:   Procedure Laterality Date   • BILATERAL BREAST REDUCTION     • COLONOSCOPY  2011    [facility]    • ESOPHAGEAL DILATATION     • TONSILLECTOMY     • UPPER GASTROINTESTINAL ENDOSCOPY  2011       Family History:  Family History   Problem Relation Age of Onset   • Breast cancer Mother 36   • Hypertension Mother    • Heart disease Father    • Cancer Father    • Heart attack Brother    • Malig Hyperthermia Neg Hx        Social History:   reports that she has never smoked. She has never used smokeless tobacco. She reports previous alcohol use. She reports that she does not use drugs.    Medications:   Medications Prior to Admission   Medication Sig Dispense Refill Last Dose   • acetaminophen (Tylenol 8 Hour) 650 MG 8 hr tablet 1 tablet Every 8 (Eight) Hours As Needed.   1/18/2022   • aspirin (aspirin) 81 MG EC tablet Take 81 mg by mouth Daily.   1/18/2022   • atenolol (TENORMIN) 100 MG tablet TAKE 1 TABLET DAILY 90 tablet 0    • calcium carb-cholecalciferol (Calcium 600+D) 600-800 MG-UNIT tablet 1 tablet.   1/18/2022   • Cetirizine HCl (ZyrTEC Allergy) 10 MG capsule Take 1 tablet by mouth Daily.   1/20/2022 at Unknown time   • fluconazole (DIFLUCAN) 150 MG tablet TAKE 1 TABLET BY MOUTH ONCE A WEEK 12 tablet 0 1/16/2022   • glipizide  "(GLUCOTROL) 10 MG tablet Take 10 mg by mouth 2 (Two) Times a Day Before Meals.   1/20/2022 at Unknown time   • Jardiance 25 MG tablet tablet TAKE 1 TABLET DAILY 90 tablet 0 1/20/2022 at Unknown time   • lisinopril (PRINIVIL,ZESTRIL) 10 MG tablet TAKE 1 TABLET DAILY 90 tablet 0 1/20/2022 at Unknown time   • metFORMIN (GLUCOPHAGE) 1000 MG tablet TAKE 1 TABLET TWICE A  tablet 0 1/20/2022 at Unknown time   • Multiple Vitamins-Iron (multivitamin with iron) tablet tablet 1 tablet.   1/18/2022   • Multiple Vitamins-Minerals (PRESERVISION AREDS 2 PO) 1 tablet.   1/18/2022   • naproxen (NAPROSYN) 250 MG tablet Take 1 tablet by mouth Daily As Needed.      • omeprazole (priLOSEC) 20 MG capsule TAKE 1 CAPSULE DAILY 90 capsule 0 1/20/2022 at Unknown time   • pioglitazone (ACTOS) 45 MG tablet TAKE 1 TABLET DAILY 90 tablet 0 1/20/2022 at Unknown time   • simvastatin (ZOCOR) 40 MG tablet TAKE 1 TABLET DAILY 90 tablet 0 1/20/2022 at Unknown time       Allergies:  Sulfa antibiotics    ROS:    Pertinent items are noted in HPI     Objective     Blood pressure 128/53, pulse 65, temperature 97.3 °F (36.3 °C), temperature source Temporal, resp. rate 20, height 154.9 cm (60.98\"), weight 114 kg (252 lb 3.3 oz), SpO2 100 %.    Physical Exam   Constitutional: Pt is oriented to person, place, and time and well-developed, well-nourished, and in no distress.   Mouth/Throat: Oropharynx is clear and moist.   Neck: Normal range of motion.   Cardiovascular: Normal rate, regular rhythm and normal heart sounds.    Pulmonary/Chest: Effort normal and breath sounds normal.   Abdominal: Soft. Nontender  Skin: Skin is warm and dry.   Psychiatric: Mood, memory, affect and judgment normal.     Assessment/Plan     Diagnosis:  GERD, screening colonoscopy    Anticipated Surgical Procedure:  EGD/colonoscopy    The risks, benefits, and alternatives of this procedure have been discussed with the patient or the responsible party- the patient understands and " agrees to proceed.

## 2022-01-21 NOTE — ANESTHESIA POSTPROCEDURE EVALUATION
Patient: Ashwini Lopez    Procedure Summary     Date: 01/21/22 Room / Location: [facility] ENDOSCOPY 1 / [facility] ENDOSCOPY    Anesthesia Start: 0726 Anesthesia Stop: 0753    Procedures:       ESOPHAGOGASTRODUODENOSCOPY WITH BIOPSIES (N/A )      COLONOSCOPY WITH POLYPECTOMY (N/A ) Diagnosis:       Screening for colon cancer      History of gastroesophageal reflux (GERD)      (Screening for colon cancer [Z12.11])      (History of gastroesophageal reflux (GERD) [Z87.19])    Surgeons: Savanna Muir MD Provider: Tu Murillo MD    Anesthesia Type: general ASA Status: 3          Anesthesia Type: general    Vitals  Vitals Value Taken Time   /56 01/21/22 0807   Temp 36.1 °C (97 °F) 01/21/22 0807   Pulse 64 01/21/22 0807   Resp 21 01/21/22 0807   SpO2 97 % 01/21/22 0807           Post Anesthesia Care and Evaluation    Patient location during evaluation: bedside  Patient participation: complete - patient participated  Level of consciousness: awake and alert  Pain management: adequate  Airway patency: patent  Anesthetic complications: No anesthetic complications  PONV Status: none  Cardiovascular status: acceptable  Respiratory status: acceptable  Hydration status: acceptable    Comments: An Anesthesiologist personally participated in the most demanding procedures (including induction and emergence if applicable) in the anesthesia plan, monitored the course of anesthesia administration at frequent intervals and remained physically present and available for immediate diagnosis and treatment of emergencies.

## 2022-01-24 LAB
CYTO UR: NORMAL
LAB AP CASE REPORT: NORMAL
LAB AP CLINICAL INFORMATION: NORMAL
PATH REPORT.FINAL DX SPEC: NORMAL
PATH REPORT.GROSS SPEC: NORMAL

## 2022-01-25 ENCOUNTER — TELEPHONE (OUTPATIENT)
Dept: GASTROENTEROLOGY | Facility: CLINIC | Age: 67
End: 2022-01-25

## 2022-02-08 NOTE — TELEPHONE ENCOUNTER
----- Message from TAMERA Finch sent at 1/24/2022  6:05 PM EST -----  Biopsies are c/w gastritis.  Recommend to avoid NSAIDS and continue PPI.  Schedule for f/u.    Place in recall for repeat colonoscopy in 5 years.    
Spoke to pt and informed of [facility] PHILIP note. Pt states that her current reflux is controlled well with PPI and acknowledges triggers such as spicy foods. F/U scheduled for 06/06/2022 @ 2640. Pt verified understanding. Apt reminder mailed.  5 year colon recall placed.   
No

## 2022-02-28 ENCOUNTER — LAB (OUTPATIENT)
Dept: LAB | Facility: HOSPITAL | Age: 67
End: 2022-02-28

## 2022-02-28 DIAGNOSIS — E11.9 TYPE 2 DIABETES MELLITUS WITHOUT COMPLICATION, WITHOUT LONG-TERM CURRENT USE OF INSULIN: ICD-10-CM

## 2022-02-28 DIAGNOSIS — E55.9 VITAMIN D DEFICIENCY: ICD-10-CM

## 2022-02-28 DIAGNOSIS — I10 ESSENTIAL HYPERTENSION: ICD-10-CM

## 2022-02-28 DIAGNOSIS — E78.2 MIXED HYPERLIPIDEMIA: ICD-10-CM

## 2022-02-28 LAB
ALBUMIN SERPL-MCNC: 4.1 G/DL (ref 3.5–5.2)
ALBUMIN/GLOB SERPL: 1.5 G/DL
ALP SERPL-CCNC: 68 U/L (ref 39–117)
ALT SERPL W P-5'-P-CCNC: 25 U/L (ref 1–33)
ANION GAP SERPL CALCULATED.3IONS-SCNC: 14 MMOL/L (ref 5–15)
AST SERPL-CCNC: 25 U/L (ref 1–32)
BILIRUB SERPL-MCNC: 0.4 MG/DL (ref 0–1.2)
BUN SERPL-MCNC: 10 MG/DL (ref 8–23)
BUN/CREAT SERPL: 10.9 (ref 7–25)
CALCIUM SPEC-SCNC: 9 MG/DL (ref 8.6–10.5)
CHLORIDE SERPL-SCNC: 98 MMOL/L (ref 98–107)
CHOLEST SERPL-MCNC: 139 MG/DL (ref 0–200)
CO2 SERPL-SCNC: 24 MMOL/L (ref 22–29)
CREAT SERPL-MCNC: 0.92 MG/DL (ref 0.57–1)
GFR SERPL CREATININE-BSD FRML MDRD: 61 ML/MIN/1.73
GLOBULIN UR ELPH-MCNC: 2.8 GM/DL
GLUCOSE SERPL-MCNC: 149 MG/DL (ref 65–99)
HBA1C MFR BLD: 7.2 % (ref 4.8–5.6)
HDLC SERPL-MCNC: 53 MG/DL (ref 40–60)
LDLC SERPL CALC-MCNC: 65 MG/DL (ref 0–100)
LDLC/HDLC SERPL: 1.17 {RATIO}
POTASSIUM SERPL-SCNC: 3.7 MMOL/L (ref 3.5–5.2)
PROT SERPL-MCNC: 6.9 G/DL (ref 6–8.5)
SODIUM SERPL-SCNC: 136 MMOL/L (ref 136–145)
TRIGL SERPL-MCNC: 119 MG/DL (ref 0–150)
VLDLC SERPL-MCNC: 21 MG/DL (ref 5–40)

## 2022-02-28 PROCEDURE — 80053 COMPREHEN METABOLIC PANEL: CPT

## 2022-02-28 PROCEDURE — 82652 VIT D 1 25-DIHYDROXY: CPT

## 2022-02-28 PROCEDURE — 36415 COLL VENOUS BLD VENIPUNCTURE: CPT

## 2022-02-28 PROCEDURE — 83036 HEMOGLOBIN GLYCOSYLATED A1C: CPT

## 2022-02-28 PROCEDURE — 80061 LIPID PANEL: CPT

## 2022-03-02 LAB — 1,25(OH)2D SERPL-MCNC: 33.2 PG/ML (ref 19.9–79.3)

## 2022-03-04 ENCOUNTER — OFFICE VISIT (OUTPATIENT)
Dept: INTERNAL MEDICINE | Facility: CLINIC | Age: 67
End: 2022-03-04

## 2022-03-04 VITALS
OXYGEN SATURATION: 99 % | HEART RATE: 65 BPM | BODY MASS INDEX: 48.26 KG/M2 | SYSTOLIC BLOOD PRESSURE: 131 MMHG | TEMPERATURE: 97.2 F | WEIGHT: 255.6 LBS | DIASTOLIC BLOOD PRESSURE: 57 MMHG | HEIGHT: 61 IN

## 2022-03-04 DIAGNOSIS — E55.9 VITAMIN D DEFICIENCY: ICD-10-CM

## 2022-03-04 DIAGNOSIS — E78.2 MIXED HYPERLIPIDEMIA: ICD-10-CM

## 2022-03-04 DIAGNOSIS — D12.3 ADENOMATOUS POLYP OF TRANSVERSE COLON: ICD-10-CM

## 2022-03-04 DIAGNOSIS — E66.01 MORBID OBESITY: ICD-10-CM

## 2022-03-04 DIAGNOSIS — K21.9 GASTROESOPHAGEAL REFLUX DISEASE WITHOUT ESOPHAGITIS: ICD-10-CM

## 2022-03-04 DIAGNOSIS — N18.31 STAGE 3A CHRONIC KIDNEY DISEASE: ICD-10-CM

## 2022-03-04 DIAGNOSIS — Z00.00 WELL ADULT EXAM: ICD-10-CM

## 2022-03-04 DIAGNOSIS — E11.9 TYPE 2 DIABETES MELLITUS WITHOUT COMPLICATION, WITHOUT LONG-TERM CURRENT USE OF INSULIN: ICD-10-CM

## 2022-03-04 DIAGNOSIS — I10 ESSENTIAL HYPERTENSION: Primary | ICD-10-CM

## 2022-03-04 PROCEDURE — 99214 OFFICE O/P EST MOD 30 MIN: CPT | Performed by: INTERNAL MEDICINE

## 2022-03-04 RX ORDER — FLUCONAZOLE 150 MG/1
150 TABLET ORAL WEEKLY
Qty: 12 TABLET | Refills: 0 | Status: SHIPPED | OUTPATIENT
Start: 2022-03-04 | End: 2022-06-06

## 2022-03-04 RX ORDER — LISINOPRIL 10 MG/1
10 TABLET ORAL DAILY
Qty: 90 TABLET | Refills: 1 | Status: SHIPPED | OUTPATIENT
Start: 2022-03-04 | End: 2022-09-29

## 2022-03-04 RX ORDER — OMEPRAZOLE 20 MG/1
20 CAPSULE, DELAYED RELEASE ORAL DAILY
Qty: 90 CAPSULE | Refills: 1 | Status: SHIPPED | OUTPATIENT
Start: 2022-03-04 | End: 2022-09-29

## 2022-03-04 RX ORDER — SIMVASTATIN 40 MG
40 TABLET ORAL DAILY
Qty: 90 TABLET | Refills: 1 | Status: SHIPPED | OUTPATIENT
Start: 2022-03-04 | End: 2022-05-19 | Stop reason: SDUPTHER

## 2022-03-04 RX ORDER — ATENOLOL 100 MG/1
100 TABLET ORAL DAILY
Qty: 90 TABLET | Refills: 1 | Status: SHIPPED | OUTPATIENT
Start: 2022-03-04 | End: 2022-09-29

## 2022-03-04 RX ORDER — GLIPIZIDE 10 MG/1
10 TABLET ORAL
Qty: 180 TABLET | Refills: 1 | Status: SHIPPED | OUTPATIENT
Start: 2022-03-04 | End: 2022-09-29

## 2022-03-04 RX ORDER — PIOGLITAZONEHYDROCHLORIDE 45 MG/1
45 TABLET ORAL DAILY
Qty: 90 TABLET | Refills: 1 | Status: SHIPPED | OUTPATIENT
Start: 2022-03-04 | End: 2022-09-29

## 2022-03-04 NOTE — ASSESSMENT & PLAN NOTE
Patient with no dysphagia or persistent dyspepsia on chronic PPI as of 3/22.  Stable to continue low-dose omeprazole.

## 2022-03-04 NOTE — ASSESSMENT & PLAN NOTE
Vitamin D was 42 in 7/21 on calcium supplementation with additional D added.  No new treatment indicated, but continue same, and will repeat on return to office after the new year.---> Vitamin D of 3 as of 3/22 remains normal, continue current over-the-counter low-dose supplementation.

## 2022-03-04 NOTE — ASSESSMENT & PLAN NOTE
BMI is 48 as of 3/22 office visit.    Patient is aware of conservative dietary restrictions that are needed,  and activity as tolerated.    (TSH was normal 7/21).

## 2022-03-04 NOTE — ASSESSMENT & PLAN NOTE
GFR is 58 as of 10/21 office visit.  Patient is hovering in the upper 50s to lower 60s, that certainly very reasonable, continue to monitor---> 61 as of 3/22 is holding steady, patient aware to avoid nonsteroidals and to avoid dehydration.  Follow-up return to office as typical.

## 2022-03-04 NOTE — PROGRESS NOTES
Chief Complaint  Diabetes and Follow-up (Pt. would like to discuss a new medication Fluconazole that Savanna Samano presribed her for yeast infection. Pt. states that she wasn't feeling well she went to the [facility] with a sore throat, taking Corcindin)    Subjective          Ashwini Lopez presents to [facility]     HPI:  Patient is a 66-year-old female with underlying diabetes mellitus on oral agents, hypertension, hyperlipidemia, among others, who is here 3/22 for routine 4-month diabetic follow-up. She has labs that need to be reviewed.      Diabetes  She has type 2 diabetes mellitus. No MedicAlert identification noted. The initial diagnosis of diabetes was made 20 years ago. Hypoglycemia symptoms include headaches and sleepiness. Pertinent negatives for hypoglycemia include no confusion, dizziness, hunger, mood changes, nervousness/anxiousness, pallor, seizures, speech difficulty, sweats or tremors. Associated symptoms include fatigue and polydipsia. Pertinent negatives for diabetes include no blurred vision, no chest pain, no foot paresthesias, no foot ulcerations, no polyphagia, no polyuria, no visual change, no weakness and no weight loss. Hypoglycemia complications include nocturnal hypoglycemia. Pertinent negatives for hypoglycemia complications include no blackouts, no hospitalization, no required assistance and no required glucagon injection. Symptoms are stable. Pertinent negatives for diabetic complications include no CVA, heart disease, nephropathy, peripheral neuropathy, PVD or retinopathy. Risk factors for coronary artery disease include dyslipidemia, family history, hypertension, obesity, post-menopausal, sedentary lifestyle and stress. Current diabetic treatment includes oral agent (triple therapy). She is compliant with treatment all of the time. Her weight is stable. She is following a generally healthy diet. Meal planning includes carbohydrate counting. She  "has not had a previous visit with a dietitian. She participates in exercise intermittently. She monitors blood glucose at home 1-2 x per day. Blood glucose monitoring compliance is good. Her home blood glucose trend is fluctuating minimally. Her breakfast blood glucose is taken between 8-9 am. Her breakfast blood glucose range is generally 70-90 mg/dl. Her dinner blood glucose is taken between 7-8 pm. Her dinner blood glucose range is generally 130-140 mg/dl. Her highest blood glucose is 180-200 mg/dl. She does not see a podiatrist.Eye exam is current.       Review of Systems   Constitutional: Positive for fatigue. Negative for appetite change, fever and unexpected weight loss.   HENT: Negative for congestion and ear pain.    Eyes: Negative for blurred vision.   Respiratory: Negative for cough, chest tightness, shortness of breath and wheezing.    Cardiovascular: Negative for chest pain, palpitations and leg swelling.   Gastrointestinal: Negative for abdominal pain.   Endocrine: Positive for polydipsia. Negative for polyphagia and polyuria.   Genitourinary: Negative for difficulty urinating, dysuria and hematuria.   Musculoskeletal: Negative for arthralgias and gait problem.   Skin: Negative for pallor and skin lesions.   Neurological: Negative for dizziness, tremors, seizures, syncope, speech difficulty, weakness, memory problem and confusion.   Psychiatric/Behavioral: Negative for self-injury and depressed mood. The patient is not nervous/anxious.        Objective   Vital Signs:   /57   Pulse 65   Temp 97.2 °F (36.2 °C)   Ht 154.9 cm (60.98\")   Wt 116 kg (255 lb 9.6 oz)   SpO2 99%   BMI 48.32 kg/m²           Physical Exam  Vitals and nursing note reviewed.   Constitutional:       General: She is not in acute distress.     Appearance: Normal appearance. She is not toxic-appearing.   HENT:      Head: Atraumatic.      Right Ear: External ear normal.      Left Ear: External ear normal.      Nose: Nose " normal.      Mouth/Throat:      Mouth: Mucous membranes are moist.   Eyes:      General:         Right eye: No discharge.         Left eye: No discharge.      Extraocular Movements: Extraocular movements intact.      Pupils: Pupils are equal, round, and reactive to light.   Cardiovascular:      Rate and Rhythm: Normal rate and regular rhythm.      Pulses: Normal pulses.      Heart sounds: Normal heart sounds. No murmur heard.  No gallop.    Pulmonary:      Effort: Pulmonary effort is normal. No respiratory distress.      Breath sounds: No wheezing, rhonchi or rales.   Abdominal:      General: There is no distension.      Palpations: Abdomen is soft. There is no mass.      Tenderness: There is no abdominal tenderness. There is no guarding.   Musculoskeletal:         General: No swelling or tenderness.      Cervical back: No tenderness.      Right lower leg: No edema.      Left lower leg: No edema.   Skin:     General: Skin is warm and dry.      Findings: No rash.   Neurological:      General: No focal deficit present.      Mental Status: She is alert and oriented to person, place, and time. Mental status is at baseline.      Motor: No weakness.      Gait: Gait normal.   Psychiatric:         Mood and Affect: Mood normal.         Thought Content: Thought content normal.          Result Review :   The following data was reviewed by: Savage Watts MD on 07/13/2021:                    Assessment and Plan    Diagnoses and all orders for this visit:    1. Essential hypertension (Primary)  Assessment & Plan:  Blood pressure is well controlled as of 3/22 office visit.  She is stable on low-dose lisinopril and max dose Tenormin, so continue same.      Orders:  -     Basic Metabolic Panel; Future    2. Mixed hyperlipidemia  Assessment & Plan:  LDL was 74 in 7/21, that is goal for her.  She is stable to continue with moderate dose simvastatin.  Repeat labs after the new year.---> LDL of 65 as of 3/22 office visit remains at  goal, so patient is stable to continue moderate dose simvastatin.        3. Stage 3a chronic kidney disease (HCC)  Assessment & Plan:  GFR is 58 as of 10/21 office visit.  Patient is hovering in the upper 50s to lower 60s, that certainly very reasonable, continue to monitor---> 61 as of 3/22 is holding steady, patient aware to avoid nonsteroidals and to avoid dehydration.  Follow-up return to office as typical.        4. Type 2 diabetes mellitus without complication, without long-term current use of insulin (HCC)  Assessment & Plan:  A1c is 7.2 as of 2/22 office visit is holding steady.  Patient is on four oral agents and unfortunately is maxed out on all of them.  Dietary therapies only treatment recommended at this time.  No indication for new agents, follow-up in 4 months as per routine.      Orders:  -     Microalbumin / Creatinine Urine Ratio - Urine, Clean Catch; Future  -     Hemoglobin A1c; Future    5. Morbid obesity (HCC)  Assessment & Plan:  BMI is 48 as of 3/22 office visit.    Patient is aware of conservative dietary restrictions that are needed,  and activity as tolerated.    (TSH was normal 7/21).        6. Adenomatous polyp of transverse colon  Overview:  EGD/colon 1/22 = polyp x1, f/u 3-5 yrs per Dr. Muir.      7. Gastroesophageal reflux disease without esophagitis  Assessment & Plan:  Patient with no dysphagia or persistent dyspepsia on chronic PPI as of 3/22.  Stable to continue low-dose omeprazole.        8. Vitamin D deficiency  Assessment & Plan:  Vitamin D was 42 in 7/21 on calcium supplementation with additional D added.  No new treatment indicated, but continue same, and will repeat on return to office after the new year.---> Vitamin D of 3 as of 3/22 remains normal, continue current over-the-counter low-dose supplementation.        9. Well adult exam  -     CBC & Differential; Future  -     Hepatitis C Antibody; Future    Other orders  -     atenolol (TENORMIN) 100 MG tablet; Take 1  "tablet by mouth Daily.  Dispense: 90 tablet; Refill: 1  -     fluconazole (DIFLUCAN) 150 MG tablet; Take 1 tablet by mouth 1 (One) Time Per Week.  Dispense: 12 tablet; Refill: 0  -     glipizide (GLUCOTROL) 10 MG tablet; Take 1 tablet by mouth 2 (Two) Times a Day Before Meals.  Dispense: 180 tablet; Refill: 1  -     empagliflozin (Jardiance) 25 MG tablet tablet; Take 1 tablet by mouth Daily.  Dispense: 90 tablet; Refill: 1  -     lisinopril (PRINIVIL,ZESTRIL) 10 MG tablet; Take 1 tablet by mouth Daily.  Dispense: 90 tablet; Refill: 1  -     metFORMIN (GLUCOPHAGE) 1000 MG tablet; Take 1 tablet by mouth 2 (Two) Times a Day.  Dispense: 180 tablet; Refill: 1  -     omeprazole (priLOSEC) 20 MG capsule; Take 1 capsule by mouth Daily.  Dispense: 90 capsule; Refill: 1  -     pioglitazone (ACTOS) 45 MG tablet; Take 1 tablet by mouth Daily.  Dispense: 90 tablet; Refill: 1  -     simvastatin (ZOCOR) 40 MG tablet; Take 1 tablet by mouth Daily.  Dispense: 90 tablet; Refill: 1     --  --  OLDER NOTES:  VISIT 3/21:  ANNUAL PHYSICAL 2/20:  --  DM with A1C 7.8 on less than prescribed meds b/c was running low; d/w wt loss is goal here and call if low spells; to [place] as well to help with diet (micro-alb neg 1/17)...7.3 is good, but goal is 6.8 given age...7.4 \" b/c of my foot and I couldn't exercise\"; will see what Jardiance will cost and try to wean glipizide...6.8 is good drop and is down to 2/1 of glipizide, so increase jardiance now and try 1/1 or 2/0 if drops again...7.7 despite increased Jardaince, so needs 2/1 again and/or get some wt off; agree with new monitor as well...7.2 and rec stay on this dose and diet please (d/w reason for wanting to get off glipizide)...7.4...is up due to being off feet for 6 weeks=8.0, so will try 45 mg actos if no worse swelling...7.1 and d/w yes, she can play with glipizide since having lows...7.3 in 6/20 is fine since only 1/2 dose glipizide now=ditto 9/20 = 7.2---> 7.2 is steady 3/21. (TSH neg " 9/20).  OPTHO neg 2/21; MICROALB=  --  HTN remains well controlled. 3/21.  ? QXE4q=89% in 3/21 = no concerns yet.  --  LIPIDS at goal with LDL 69...67---> 88 in 9/20.  SPECT 7/16 with EF 50% and no ischemia (FH CHF=B MI at age 41).  --  FE DEF ANEMIA is up 11.5 to 12.4 and d/w stay on same MVI as of 5/17 OV...13.1 with lowish iron, stay on MVI...stable=defer a while after 2/19 OV---> all labs stable as of 9/20 = stay on MVI.  --  ELEVATED LFTS=wnl as of 1/17... ditto.  GERD w/o dysphagia on PPI and I rec trial of qod; (s/p prior dilation)  --  VIT D DEF=fine 9/20.  BMD neg per Dr Cuellar age 61 and she is scheduled for 1 soon as of her 10/21 office visit, discussed with patient to call us or the GYN for results.  --  S/P FALL 10/17 = fell off stool, to [facility], had HCT, balance and paresthesia's since; exam non-focal, neg FTN, neg rhomberg; will get dopplers and review the cat scan, but o/w no tx rec...Dopplers 2/18 were fine; will send to ENT due to sxs with turning head...?  S/P FALL 7/19 = RLE major swelling, had VELE=neg; saw Ortho and they sent to PT...fell again, had MRI=R tibeal plateau fx and was in brace for 6 weeks per Dr Krishnan; has f/u with him before PT to resume; I d/w try replace aleve with tylenol arthritis.  --  MORBID [id number] is stuck (TSH neg 2/20).  --  --  MMG 9/10/2021 per Dr Mac.  COLON 10/11...wnl...10 yrs rec=neg 1st degree relative colon ca.  Pneumo #1 '01, Prevnar '16; Shingrix x1/Hep A pending;   COVID x3 with Pfizer, last was 9/24/2021.  Flu shot for the 2021 season completed 10/8/2021 at 49domain.  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    Follow Up   Return in about 4 months (around 7/4/2022).  Patient was given instructions and counseling regarding her condition or for health maintenance advice. Please see specific information pulled into the AVS if appropriate.

## 2022-03-04 NOTE — ASSESSMENT & PLAN NOTE
LDL was 74 in 7/21, that is goal for her.  She is stable to continue with moderate dose simvastatin.  Repeat labs after the new year.---> LDL of 65 as of 3/22 office visit remains at goal, so patient is stable to continue moderate dose simvastatin.

## 2022-03-04 NOTE — ASSESSMENT & PLAN NOTE
Blood pressure is well controlled as of 3/22 office visit.  She is stable on low-dose lisinopril and max dose Tenormin, so continue same.

## 2022-03-04 NOTE — ASSESSMENT & PLAN NOTE
A1c is 7.2 as of 2/22 office visit is holding steady.  Patient is on four oral agents and unfortunately is maxed out on all of them.  Dietary therapies only treatment recommended at this time.  No indication for new agents, follow-up in 4 months as per routine.

## 2022-05-18 ENCOUNTER — TELEPHONE (OUTPATIENT)
Dept: INTERNAL MEDICINE | Facility: CLINIC | Age: 67
End: 2022-05-18

## 2022-05-18 NOTE — TELEPHONE ENCOUNTER
Pharmacy Name: [facility] - [place], [place] - [address] AT PORTAL TO REGISTERED [facility] - [phone] [facility] [phone]      Pharmacy representative name: SALVATORE    Pharmacy representative phone number: [phone] OPTION 2, REFERENCE NUMBER: [id number]    What medication are you calling in regards to: simvastatin (ZOCOR) 40 MG tablet  fluconazole (DIFLUCAN) 150 MG tablet    What question does the pharmacy have: SALVATORE FROM [facility] STATED THE PATIENT'S simvastatin (ZOCOR) 40 MG tablet HAS A DRUG TO DRUG INTERACTION fluconazole (DIFLUCAN) 150 MG tablet. SHE WOULD LIKE TO VERIFY PCP IS AWARE AND IF HE WANTS PATIENT TO DISCONTINUE ONE MEDICATION OR WHAT HIS RECOMMENDATION WOULD BE.  SHE WOULD LIKE A CALL BACK TO CONFIRM    [phone] OPTION 2,   REFERENCE NUMBER: [id number]      Who is the provider that prescribed the medication: COLBY SEQUEIRA

## 2022-05-18 NOTE — TELEPHONE ENCOUNTER
Please call [facility] and let them know the patient will hold simvastatin while taking Diflucan.  Please then call the patient and tell her to hold simvastatin while she is on Diflucan.  Thanks.

## 2022-05-19 ENCOUNTER — TELEPHONE (OUTPATIENT)
Dept: INTERNAL MEDICINE | Facility: CLINIC | Age: 67
End: 2022-05-19

## 2022-05-19 RX ORDER — SIMVASTATIN 40 MG
40 TABLET ORAL DAILY
Qty: 90 TABLET | Refills: 1 | Status: SHIPPED | OUTPATIENT
Start: 2022-05-19 | End: 2022-09-29

## 2022-05-19 NOTE — TELEPHONE ENCOUNTER
Call[facility]: [facility] - [place], [place] - [address] AT PORTAL TO REGISTERED [facility] - [phone]  - [phone] FX    Relationship: Pharmacy    Best call back number: [phone]  OPTION 2     What is the best time to reach you: ANY    Who are you requesting to speak with (clinical staff, provider,  specific staff member):  CLINICAL STAFF        What was the call regarding: PATIENT PHARMACY CALLED STATING THAT THEY HAVE QUESTIONS ON MEDICATION ZOCOR.    Do you require a callback: YES

## 2022-05-20 NOTE — TELEPHONE ENCOUNTER
I let the PT know & she wants to discuss this more in June with her appointment with you. I also called the Pharmacy & let them know as well.

## 2022-06-06 ENCOUNTER — OFFICE VISIT (OUTPATIENT)
Dept: GASTROENTEROLOGY | Facility: CLINIC | Age: 67
End: 2022-06-06

## 2022-06-06 VITALS
DIASTOLIC BLOOD PRESSURE: 60 MMHG | HEART RATE: 67 BPM | BODY MASS INDEX: 47.2 KG/M2 | SYSTOLIC BLOOD PRESSURE: 140 MMHG | HEIGHT: 61 IN | WEIGHT: 250 LBS

## 2022-06-06 DIAGNOSIS — K57.90 DIVERTICULOSIS: ICD-10-CM

## 2022-06-06 DIAGNOSIS — D13.1 FUNDIC GLAND POLYPS OF STOMACH, BENIGN: ICD-10-CM

## 2022-06-06 DIAGNOSIS — K21.00 GASTROESOPHAGEAL REFLUX DISEASE WITH ESOPHAGITIS WITHOUT HEMORRHAGE: Primary | ICD-10-CM

## 2022-06-06 PROCEDURE — 99213 OFFICE O/P EST LOW 20 MIN: CPT | Performed by: NURSE PRACTITIONER

## 2022-06-06 NOTE — PATIENT INSTRUCTIONS
Food Choices for Gastroesophageal Reflux Disease, Adult  When you have gastroesophageal reflux disease (GERD), the foods you eat and your eating habits are very important. Choosing the right foods can help ease your discomfort. Think about working with a food expert (dietitian) to help you make good choices.  What are tips for following this plan?  Reading food labels  Look for foods that are low in saturated fat. Foods that may help with your symptoms include:  Foods that have less than 5% of daily value (DV) of fat.  Foods that have 0 grams of trans fat.  Cooking  Do not saucedo your food.  Cook your food by baking, steaming, grilling, or broiling. These are all methods that do not need a lot of fat for cooking.  To add flavor, try to use herbs that are low in spice and acidity.  Meal planning    Choose healthy foods that are low in fat, such as:  Fruits and vegetables.  Whole grains.  Low-fat dairy products.  Lean meats, fish, and poultry.  Eat small meals often instead of eating 3 large meals each day. Eat your meals slowly in a place where you are relaxed. Avoid bending over or lying down until 2-3 hours after eating.  Limit high-fat foods such as fatty meats or fried foods.  Limit your intake of fatty foods, such as oils, butter, and shortening.  Avoid the following as told by your doctor:  Foods that cause symptoms. These may be different for different people. Keep a food diary to keep track of foods that cause symptoms.  Alcohol.  Drinking a lot of liquid with meals.  Eating meals during the 2-3 hours before bed.    Lifestyle  Stay at a healthy weight. Ask your doctor what weight is healthy for you. If you need to lose weight, work with your doctor to do so safely.  Exercise for at least 30 minutes on 5 or more days each week, or as told by your doctor.  Wear loose-fitting clothes.  Do not smoke or use any products that contain nicotine or tobacco. If you need help quitting, ask your doctor.  Sleep with the head  of your bed higher than your feet. Use a wedge under the mattress or blocks under the bed frame to raise the head of the bed.  Chew sugar-free gum after meals.  What foods should eat?    Eat a healthy, well-balanced diet of fruits, vegetables, whole grains, low-fat dairy products, lean meats, fish, and poultry. Each person is different.  Foods that may cause symptoms in one person may not cause any symptoms in another person. Work with your doctor to find foods that are safe for you.  The items listed above may not be a complete list of what you can eat and drink. Contact a food expert for more options.  What foods should I avoid?  Limiting some of these foods may help in managing the symptoms of GERD. Everyone is different. Talk with a food expert or your doctor to help you find the exact foods to avoid, if any.  Fruits  Any fruits prepared with added fat. Any fruits that cause symptoms. For some people, this may include citrus fruits, such as oranges, grapefruit, pineapple, and americo.  Vegetables  Deep-fried vegetables. French fries. Any vegetables prepared with added fat. Any vegetables that cause symptoms. For some people, this may include tomatoes and tomato products, chili peppers, onions and garlic, and horseradish.  Grains  Pastries or quick breads with added fat.  Meats and other proteins  High-fat meats, such as fatty beef or pork, hot dogs, ribs, ham, sausage, salami, and heart. Fried meat or protein, including fried fish and fried chicken. Nuts and nut butters, in large amounts.  Dairy  Whole milk and chocolate milk. Sour cream. Cream. Ice cream. Cream cheese. Milkshakes.  Fats and oils  Butter. Margarine. Shortening. Ghee.  Beverages  Coffee and tea, with or without caffeine. Carbonated beverages. Sodas. Energy drinks. Fruit juice made with acidic fruits, such as orange or grapefruit. Tomato juice. Alcoholic drinks.  Sweets and desserts  Chocolate and cocoa. Donuts.  Seasonings and condiments  Pepper.  Peppermint and spearmint. Added salt. Any condiments, herbs, or seasonings that cause symptoms. For some people, this may include mullen, hot sauce, or vinegar-based salad dressings.  The items listed above may not be a complete list of what you should not eat and drink. Contact a food expert for more options.  Questions to ask your doctor  Diet and lifestyle changes are often the first steps that are taken to manage symptoms of GERD. If diet and lifestyle changes do not help, talk with your doctor about taking medicines.  Where to find more information  [employer] G[employer]intestinal [employer]: aboutgerd.org  Summary  When you have GERD, food and lifestyle choices are very important in easing your symptoms.  Eat small meals often instead of 3 large meals a day. Eat your meals slowly and in a place where you are relaxed.  Avoid bending over or lying down until 2-3 hours after eating.  Limit high-fat foods such as fatty meats or fried foods.  This information is not intended to replace advice given to you by your health care provider. Make sure you discuss any questions you have with your health care provider.  Document Revised: 06/28/2021 Document Reviewed: 06/28/2021  Elsevier Patient Education © 2021 Else[employer] Inc.

## 2022-06-06 NOTE — PROGRESS NOTES
Chief Complaint  Follow-up (Egd/colonoscopy)    Ashwini Lopez is a 67 y.o. female who presents to [facility] for follow-up of heartburn    History of present Illness    She reports that GERD is controlled with daily omeprazole.  She's been on this for several years and reports previously needing esophageal dilatation.  Denies any current symptoms of dysphagia.  States that she only experiences symptoms of reflux with certain foods or eating late at night.      Reports a regular bowel pattern.  Denies hematochezia and melena.        Past Medical History:   Diagnosis Date   • Chronic fatigue, unspecified    • Diverticulitis    • Essential (primary) hypertension    • GERD (gastroesophageal reflux disease)    • High cholesterol    • Iron deficiency anemia, unspecified    • Mixed hyperlipidemia    • Morbid (severe) obesity due to excess calories (HCC)    • Seasonal allergies    • Type 2 diabetes mellitus without complication (HCC)    • Vitamin D deficiency, unspecified        Past Surgical History:   Procedure Laterality Date   • BILATERAL BREAST REDUCTION     • COLONOSCOPY  2011    [facility]    • COLONOSCOPY N/A 1/21/2022    Procedure: COLONOSCOPY WITH POLYPECTOMY;  Surgeon: Savanna Muir MD;  Location: [facility] ENDOSCOPY;  Service: Gastroenterology;  Laterality: N/A;  COLON POLYP, DIVERTICULOSIS, HEMORRHOIDS   • ENDOSCOPY N/A 1/21/2022    Procedure: ESOPHAGOGASTRODUODENOSCOPY WITH BIOPSIES;  Surgeon: Savanna Muir MD;  Location: [facility] ENDOSCOPY;  Service: Gastroenterology;  Laterality: N/A;  GASTRIC POLYPS, HIATAL HERNIA   • ESOPHAGEAL DILATATION     • TONSILLECTOMY     • UPPER GASTROINTESTINAL ENDOSCOPY  2011         Current Outpatient Medications:   •  acetaminophen (TYLENOL) 650 MG 8 hr tablet, 1 tablet Every 8 (Eight) Hours As Needed., Disp: , Rfl:   •  aspirin 81 MG EC tablet, Take 81 mg by mouth Daily., Disp: , Rfl:   •  atenolol (TENORMIN) 100 MG  "tablet, Take 1 tablet by mouth Daily., Disp: 90 tablet, Rfl: 1  •  calcium carb-cholecalciferol 600-800 MG-UNIT tablet, 1 tablet., Disp: , Rfl:   •  Cetirizine HCl (ZyrTEC Allergy) 10 MG capsule, Take 1 tablet by mouth Daily., Disp: , Rfl:   •  empagliflozin (Jardiance) 25 MG tablet tablet, Take 1 tablet by mouth Daily., Disp: 90 tablet, Rfl: 1  •  glipizide (GLUCOTROL) 10 MG tablet, Take 1 tablet by mouth 2 (Two) Times a Day Before Meals., Disp: 180 tablet, Rfl: 1  •  lisinopril (PRINIVIL,ZESTRIL) 10 MG tablet, Take 1 tablet by mouth Daily., Disp: 90 tablet, Rfl: 1  •  metFORMIN (GLUCOPHAGE) 1000 MG tablet, Take 1 tablet by mouth 2 (Two) Times a Day., Disp: 180 tablet, Rfl: 1  •  Multiple Vitamins-Iron (multivitamin with iron) tablet tablet, 1 tablet., Disp: , Rfl:   •  Multiple Vitamins-Minerals (PRESERVISION AREDS 2 PO), 1 tablet., Disp: , Rfl:   •  omeprazole (priLOSEC) 20 MG capsule, Take 1 capsule by mouth Daily., Disp: 90 capsule, Rfl: 1  •  pioglitazone (ACTOS) 45 MG tablet, Take 1 tablet by mouth Daily., Disp: 90 tablet, Rfl: 1  •  simvastatin (ZOCOR) 40 MG tablet, Take 1 tablet by mouth Daily., Disp: 90 tablet, Rfl: 1     Allergies   Allergen Reactions   • Sulfa Antibiotics Hives       Family History   Problem Relation Age of Onset   • Breast cancer Mother 36   • Hypertension Mother    • Heart disease Father    • Cancer Father    • Heart attack Brother    • Malig Hyperthermia Neg Hx         Social History     Social History Narrative   • Not on file       Objective       Vital Signs:   /60 (BP Location: Left arm, Patient Position: Sitting, Cuff Size: Adult)   Pulse 67   Ht 154.9 cm (61\")   Wt 113 kg (250 lb)   BMI 47.24 kg/m²       Physical Exam  Constitutional:       General: She is not in acute distress.     Appearance: Normal appearance. She is well-developed and normal weight.   HENT:      Head: Normocephalic and atraumatic.   Eyes:      Conjunctiva/sclera: Conjunctivae normal.      Pupils: " Pupils are equal, round, and reactive to light.      Visual Fields: Right eye visual fields normal and left eye visual fields normal.   Cardiovascular:      Rate and Rhythm: Normal rate and regular rhythm.      Heart sounds: Normal heart sounds.   Pulmonary:      Effort: Pulmonary effort is normal. No retractions.      Breath sounds: Normal breath sounds and air entry.   Abdominal:      General: Bowel sounds are normal.      Palpations: Abdomen is soft.      Tenderness: There is no abdominal tenderness.      Comments: No appreciable hepatosplenomegaly or ascites   Musculoskeletal:         General: Normal range of motion.      Cervical back: Neck supple.      Right lower leg: No edema.      Left lower leg: No edema.   Lymphadenopathy:      Cervical: No cervical adenopathy.   Skin:     General: Skin is warm and dry.      Findings: No lesion.   Neurological:      General: No focal deficit present.      Mental Status: She is alert and oriented to person, place, and time.   Psychiatric:         Mood and Affect: Mood and affect normal.         Behavior: Behavior normal.         Result Review :     The following data was reviewed by: TAMERA Finch on 06/06/2022:    1/21/2022 EGD/colonoscopy-irregularity found at the GE junction, biopsy-chronic inflammation.  Small hiatal hernia.  Multiple (4-6 mm) polyps in the gastric fundus and gastric body, biopsy consistent with fundic gland polyps.  Entire examined stomach was normal.  Gastric antrum with mild reactive gastropathy.  Normal duodenum.  Perianal and digital rectal exams were normal.  4 mm polyp in the transverse colon-tubular adenoma, completely removed.  5 mm lipoma at the ileocecal valve. Mild diverticulosis in the sigmoid colon.  Internal hemorrhoids.  Recommend repeat colonoscopy in 5 years.                      Assessment and Plan    Diagnoses and all orders for this visit:    1. Gastroesophageal reflux disease with esophagitis without hemorrhage  (Primary)    2. Diverticulosis    3. Fundic gland polyps of stomach, benign      Discussed with patient diverticulosis (handout given).  Recommend to continue omeprazole as reflux symptoms are controlled.         Follow Up   Return if symptoms worsen or fail to improve.  Patient was given instructions and counseling regarding her condition or for health maintenance advice. Please see specific information pulled into the AVS if appropriate.

## 2022-06-20 ENCOUNTER — TRANSCRIBE ORDERS (OUTPATIENT)
Dept: ADMINISTRATIVE | Facility: HOSPITAL | Age: 67
End: 2022-06-20

## 2022-06-20 DIAGNOSIS — Z12.31 VISIT FOR SCREENING MAMMOGRAM: Primary | ICD-10-CM

## 2022-06-24 ENCOUNTER — LAB (OUTPATIENT)
Dept: LAB | Facility: HOSPITAL | Age: 67
End: 2022-06-24

## 2022-06-24 DIAGNOSIS — I10 ESSENTIAL HYPERTENSION: ICD-10-CM

## 2022-06-24 DIAGNOSIS — E11.9 TYPE 2 DIABETES MELLITUS WITHOUT COMPLICATION, WITHOUT LONG-TERM CURRENT USE OF INSULIN: ICD-10-CM

## 2022-06-24 DIAGNOSIS — Z00.00 WELL ADULT EXAM: ICD-10-CM

## 2022-06-24 LAB
ALBUMIN UR-MCNC: <1.2 MG/DL
ANION GAP SERPL CALCULATED.3IONS-SCNC: 11.9 MMOL/L (ref 5–15)
BASOPHILS # BLD AUTO: 0.07 10*3/MM3 (ref 0–0.2)
BASOPHILS NFR BLD AUTO: 0.9 % (ref 0–1.5)
BUN SERPL-MCNC: 12 MG/DL (ref 8–23)
BUN/CREAT SERPL: 11.9 (ref 7–25)
CALCIUM SPEC-SCNC: 9.9 MG/DL (ref 8.6–10.5)
CHLORIDE SERPL-SCNC: 103 MMOL/L (ref 98–107)
CO2 SERPL-SCNC: 25.1 MMOL/L (ref 22–29)
CREAT SERPL-MCNC: 1.01 MG/DL (ref 0.57–1)
CREAT UR-MCNC: 23.9 MG/DL
DEPRECATED RDW RBC AUTO: 37.6 FL (ref 37–54)
EGFRCR SERPLBLD CKD-EPI 2021: 61.5 ML/MIN/1.73
EOSINOPHIL # BLD AUTO: 0.34 10*3/MM3 (ref 0–0.4)
EOSINOPHIL NFR BLD AUTO: 4.4 % (ref 0.3–6.2)
ERYTHROCYTE [DISTWIDTH] IN BLOOD BY AUTOMATED COUNT: 12.2 % (ref 12.3–15.4)
GLUCOSE SERPL-MCNC: 140 MG/DL (ref 65–99)
HBA1C MFR BLD: 7.5 % (ref 4.8–5.6)
HCT VFR BLD AUTO: 42.4 % (ref 34–46.6)
HCV AB SER DONR QL: NORMAL
HGB BLD-MCNC: 13.6 G/DL (ref 12–15.9)
IMM GRANULOCYTES # BLD AUTO: 0.04 10*3/MM3 (ref 0–0.05)
IMM GRANULOCYTES NFR BLD AUTO: 0.5 % (ref 0–0.5)
LYMPHOCYTES # BLD AUTO: 2.61 10*3/MM3 (ref 0.7–3.1)
LYMPHOCYTES NFR BLD AUTO: 33.6 % (ref 19.6–45.3)
MCH RBC QN AUTO: 27.6 PG (ref 26.6–33)
MCHC RBC AUTO-ENTMCNC: 32.1 G/DL (ref 31.5–35.7)
MCV RBC AUTO: 86 FL (ref 79–97)
MICROALBUMIN/CREAT UR: NORMAL MG/G{CREAT}
MONOCYTES # BLD AUTO: 0.67 10*3/MM3 (ref 0.1–0.9)
MONOCYTES NFR BLD AUTO: 8.6 % (ref 5–12)
NEUTROPHILS NFR BLD AUTO: 4.03 10*3/MM3 (ref 1.7–7)
NEUTROPHILS NFR BLD AUTO: 52 % (ref 42.7–76)
NRBC BLD AUTO-RTO: 0 /100 WBC (ref 0–0.2)
PLATELET # BLD AUTO: 315 10*3/MM3 (ref 140–450)
PMV BLD AUTO: 10.5 FL (ref 6–12)
POTASSIUM SERPL-SCNC: 4.9 MMOL/L (ref 3.5–5.2)
RBC # BLD AUTO: 4.93 10*6/MM3 (ref 3.77–5.28)
SODIUM SERPL-SCNC: 140 MMOL/L (ref 136–145)
WBC NRBC COR # BLD: 7.76 10*3/MM3 (ref 3.4–10.8)

## 2022-06-24 PROCEDURE — 80048 BASIC METABOLIC PNL TOTAL CA: CPT

## 2022-06-24 PROCEDURE — 85025 COMPLETE CBC W/AUTO DIFF WBC: CPT

## 2022-06-24 PROCEDURE — 86803 HEPATITIS C AB TEST: CPT

## 2022-06-24 PROCEDURE — 82570 ASSAY OF URINE CREATININE: CPT

## 2022-06-24 PROCEDURE — 82043 UR ALBUMIN QUANTITATIVE: CPT

## 2022-06-24 PROCEDURE — 36415 COLL VENOUS BLD VENIPUNCTURE: CPT

## 2022-06-24 PROCEDURE — 83036 HEMOGLOBIN GLYCOSYLATED A1C: CPT

## 2022-06-30 ENCOUNTER — OFFICE VISIT (OUTPATIENT)
Dept: INTERNAL MEDICINE | Facility: CLINIC | Age: 67
End: 2022-06-30

## 2022-06-30 VITALS
HEIGHT: 61 IN | HEART RATE: 67 BPM | BODY MASS INDEX: 47.54 KG/M2 | TEMPERATURE: 97.2 F | DIASTOLIC BLOOD PRESSURE: 77 MMHG | WEIGHT: 251.8 LBS | OXYGEN SATURATION: 94 % | SYSTOLIC BLOOD PRESSURE: 136 MMHG

## 2022-06-30 DIAGNOSIS — N18.31 STAGE 3A CHRONIC KIDNEY DISEASE: Primary | ICD-10-CM

## 2022-06-30 DIAGNOSIS — I10 ESSENTIAL HYPERTENSION: ICD-10-CM

## 2022-06-30 DIAGNOSIS — E55.9 VITAMIN D DEFICIENCY: ICD-10-CM

## 2022-06-30 DIAGNOSIS — E11.9 TYPE 2 DIABETES MELLITUS WITHOUT COMPLICATION, WITHOUT LONG-TERM CURRENT USE OF INSULIN: ICD-10-CM

## 2022-06-30 DIAGNOSIS — Z00.00 WELL ADULT EXAM: ICD-10-CM

## 2022-06-30 DIAGNOSIS — E78.2 MIXED HYPERLIPIDEMIA: ICD-10-CM

## 2022-06-30 DIAGNOSIS — K21.9 GASTROESOPHAGEAL REFLUX DISEASE WITHOUT ESOPHAGITIS: ICD-10-CM

## 2022-06-30 PROCEDURE — 99214 OFFICE O/P EST MOD 30 MIN: CPT | Performed by: INTERNAL MEDICINE

## 2022-06-30 PROCEDURE — 90677 PCV20 VACCINE IM: CPT | Performed by: INTERNAL MEDICINE

## 2022-06-30 PROCEDURE — G0009 ADMIN PNEUMOCOCCAL VACCINE: HCPCS | Performed by: INTERNAL MEDICINE

## 2022-07-25 ENCOUNTER — OFFICE VISIT (OUTPATIENT)
Dept: OBSTETRICS AND GYNECOLOGY | Facility: CLINIC | Age: 67
End: 2022-07-25

## 2022-07-25 VITALS
SYSTOLIC BLOOD PRESSURE: 122 MMHG | HEART RATE: 74 BPM | BODY MASS INDEX: 47.83 KG/M2 | DIASTOLIC BLOOD PRESSURE: 61 MMHG | WEIGHT: 253 LBS

## 2022-07-25 DIAGNOSIS — Z01.419 ENCOUNTER FOR GYNECOLOGICAL EXAMINATION WITHOUT ABNORMAL FINDING: Primary | ICD-10-CM

## 2022-07-25 LAB
DEVELOPER EXPIRATION DATE: NORMAL
DEVELOPER LOT NUMBER: NORMAL
EXPIRATION DATE: NORMAL
FECAL OCCULT BLOOD SCREEN, POC: NEGATIVE
Lab: NORMAL
NEGATIVE CONTROL: NEGATIVE
POSITIVE CONTROL: POSITIVE

## 2022-07-25 PROCEDURE — 99397 PER PM REEVAL EST PAT 65+ YR: CPT | Performed by: OBSTETRICS & GYNECOLOGY

## 2022-07-25 PROCEDURE — G0123 SCREEN CERV/VAG THIN LAYER: HCPCS | Performed by: OBSTETRICS & GYNECOLOGY

## 2022-07-25 PROCEDURE — 82274 ASSAY TEST FOR BLOOD FECAL: CPT | Performed by: OBSTETRICS & GYNECOLOGY

## 2022-07-25 NOTE — PROGRESS NOTES
Well Woman Visit    CC: Annual well woman exam       HPI:   67 y.o. Contraception or HRT: Post menopausal, using no HRT  Menses:  none  Pain:  None  Incontinence concerns: No  Hx of abnormal pap:  No  Pt has no complaints today.      History: PMHx, Meds, Allergies, PSHx, Social Hx, and POBHx all reviewed and updated.      PHYSICAL EXAM:  /61   Pulse 74   Wt 115 kg (253 lb)   BMI 47.83 kg/m²   General- NAD, alert and oriented, appropriate  Psych- Normal mood, good memory  Neck- No masses, no thyroid enlargement  CV- Regular rhythm, no murnurs  Resp- CTA to bases, no wheezes  Abdomen- Soft, non distended, non tender, no masses    Breast left-  Bilaterally symmetrical, no masses, non tender, no nipple discharge  Breast right- Bilaterally symmetrical, no masses, non tender, no nipple discharge    External genitalia- Normal female, no lesions  Urethra/meatus- Normal, no masses, non tender, no prolapse  Bladder- Normal, no masses, non tender, no prolapse  Vagina- Normal, no atrophy, no lesions, no discharge, no prolapse  Cvx- Normal, no lesions, no discharge, No cervical motion tenderness  Uterus- Normal size, shape & consistency.  Non tender, mobile, & no prolapse  Adnexa- No mass, non tender, Difficult to palpate  Anus/Rectum/Perineum- Normal appearance, no mass, good sphincter tone, no hemorrhoids, no prolapse , Hemoccult neg    Lymphatic- No palpable neck, axillary, or groin nodes  Ext- No edema, no cyanosis    Skin- No lesions, no rashes, no acanthosis nigricans        ASSESSMENT and PLAN:  WWE    Diagnoses and all orders for this visit:    1. Encounter for gynecological examination without abnormal finding (Primary)  -     POC Occult Blood Stool  -     IGP,rfx Aptima HPV All Pth        Domestic violence/abuse screen: negative    Depression screen: no SI    Preventative:   BREAST HEALTH- Monthly self breast exam importance and how to reviewed. MMG and/or MRI (prn) reviewed per society guidelines and  her individual history. Mammo/MRI screen: Already up to date.  CERVICAL CANCER Screening- Reviewed current ASCCP guidelines for screening w and wo cotest HPV, age specific.  Screen: Updated today, discussed guidelines, pt desires to continue yearly paps for now.  COLON CANCER Screening- Reviewed current medical society guidelines and options.  Colonoscopy screen:  Already up to date.  SEXUAL HEALTH: Declines STD screening.  BONE HEALTH- Reviewed current medical society guidelines and options for testing, calcium and vit D intake.  Weight bearing exercise.  DEXA: Already up to date.  VACCINATIONS Recommended: Flu annually, Zoster (49yo and older), Pneumococcal (64yo and older).  Importance discussed, risk being unvaccinated reviewed.  Questions answered  Smoking status- NON SMOKER.  Importance of avoiding second hand smoke.  Follow up PCP/Specialist PMHx and Labs  Myriad: Does not qualify.      She understands the importance of having any ordered tests to be performed in a timely fashion.  She is encouraged to review her results online and/or contact or office if she has questions.     Follow Up:  Return if symptoms worsen or fail to improve.      Mala Samano, APRN  07/25/2022

## 2022-07-27 LAB
CONV .: NORMAL
CYTOLOGIST CVX/VAG CYTO: NORMAL
CYTOLOGY CVX/VAG DOC CYTO: NORMAL
CYTOLOGY CVX/VAG DOC THIN PREP: NORMAL
DX ICD CODE: NORMAL
HIV 1 & 2 AB SER-IMP: NORMAL
OTHER STN SPEC: NORMAL
STAT OF ADQ CVX/VAG CYTO-IMP: NORMAL

## 2022-09-14 ENCOUNTER — HOSPITAL ENCOUNTER (OUTPATIENT)
Dept: MAMMOGRAPHY | Facility: HOSPITAL | Age: 67
Discharge: HOME | End: 2022-09-14
Admitting: OBSTETRICS & GYNECOLOGY

## 2022-09-14 DIAGNOSIS — Z12.31 VISIT FOR SCREENING MAMMOGRAM: ICD-10-CM

## 2022-09-14 PROCEDURE — 77067 SCR MAMMO BI INCL CAD: CPT

## 2022-09-14 PROCEDURE — 77063 BREAST TOMOSYNTHESIS BI: CPT

## 2022-09-21 ENCOUNTER — LAB (OUTPATIENT)
Dept: LAB | Facility: HOSPITAL | Age: 67
End: 2022-09-21

## 2022-09-21 DIAGNOSIS — E11.9 TYPE 2 DIABETES MELLITUS WITHOUT COMPLICATION, WITHOUT LONG-TERM CURRENT USE OF INSULIN: ICD-10-CM

## 2022-09-21 DIAGNOSIS — Z00.00 WELL ADULT EXAM: ICD-10-CM

## 2022-09-21 DIAGNOSIS — E78.2 MIXED HYPERLIPIDEMIA: ICD-10-CM

## 2022-09-21 DIAGNOSIS — E55.9 VITAMIN D DEFICIENCY: ICD-10-CM

## 2022-09-21 DIAGNOSIS — N18.31 STAGE 3A CHRONIC KIDNEY DISEASE: ICD-10-CM

## 2022-09-21 LAB
25(OH)D3 SERPL-MCNC: 48.7 NG/ML (ref 30–100)
ALBUMIN SERPL-MCNC: 4.3 G/DL (ref 3.5–5.2)
ALBUMIN/GLOB SERPL: 1.3 G/DL
ALP SERPL-CCNC: 62 U/L (ref 39–117)
ALT SERPL W P-5'-P-CCNC: 19 U/L (ref 1–33)
ANION GAP SERPL CALCULATED.3IONS-SCNC: 13.5 MMOL/L (ref 5–15)
AST SERPL-CCNC: 22 U/L (ref 1–32)
BILIRUB SERPL-MCNC: 0.3 MG/DL (ref 0–1.2)
BUN SERPL-MCNC: 11 MG/DL (ref 8–23)
BUN/CREAT SERPL: 10.6 (ref 7–25)
CALCIUM SPEC-SCNC: 9.6 MG/DL (ref 8.6–10.5)
CHLORIDE SERPL-SCNC: 105 MMOL/L (ref 98–107)
CHOLEST SERPL-MCNC: 130 MG/DL (ref 0–200)
CO2 SERPL-SCNC: 24.5 MMOL/L (ref 22–29)
CREAT SERPL-MCNC: 1.04 MG/DL (ref 0.57–1)
EGFRCR SERPLBLD CKD-EPI 2021: 59.4 ML/MIN/1.73
GLOBULIN UR ELPH-MCNC: 3.2 GM/DL
GLUCOSE SERPL-MCNC: 109 MG/DL (ref 65–99)
HBA1C MFR BLD: 7 % (ref 4.8–5.6)
HDLC SERPL-MCNC: 51 MG/DL (ref 40–60)
LDLC SERPL CALC-MCNC: 53 MG/DL (ref 0–100)
LDLC/HDLC SERPL: 0.93 {RATIO}
POTASSIUM SERPL-SCNC: 4.3 MMOL/L (ref 3.5–5.2)
PROT SERPL-MCNC: 7.5 G/DL (ref 6–8.5)
SODIUM SERPL-SCNC: 143 MMOL/L (ref 136–145)
TRIGL SERPL-MCNC: 157 MG/DL (ref 0–150)
TSH SERPL DL<=0.05 MIU/L-ACNC: 1.51 UIU/ML (ref 0.27–4.2)
VLDLC SERPL-MCNC: 26 MG/DL (ref 5–40)

## 2022-09-21 PROCEDURE — 80061 LIPID PANEL: CPT

## 2022-09-21 PROCEDURE — 80053 COMPREHEN METABOLIC PANEL: CPT

## 2022-09-21 PROCEDURE — 36415 COLL VENOUS BLD VENIPUNCTURE: CPT

## 2022-09-21 PROCEDURE — 82306 VITAMIN D 25 HYDROXY: CPT

## 2022-09-21 PROCEDURE — 84443 ASSAY THYROID STIM HORMONE: CPT

## 2022-09-21 PROCEDURE — 83036 HEMOGLOBIN GLYCOSYLATED A1C: CPT

## 2022-09-26 NOTE — PROGRESS NOTES
"Chief Complaint  Hypertension and Follow-up (Routine follow up with labs taken. Tested positive for covid on Sept 2nd, minor symptoms.)    Subjective          Ashwini Lopez presents to [facility]     History of present illness:  Patient is a 67-year-old female with underlying diabetes mellitus on oral agents, hypertension, hyperlipidemia, among others, who is here 9/22 for routine 3-month diabetic follow-up.  We will review her med list, go over her labs in detail, and address any new concerns she may have.    Review of Systems   Constitutional: Positive for fatigue. Negative for appetite change, fever and unexpected weight loss.   HENT: Negative for congestion and ear pain.    Eyes: Negative for blurred vision.   Respiratory: Negative for cough, chest tightness, shortness of breath and wheezing.    Cardiovascular: Negative for chest pain, palpitations and leg swelling.   Gastrointestinal: Negative for abdominal pain.   Endocrine: Positive for polydipsia. Negative for polyphagia and polyuria.   Genitourinary: Negative for difficulty urinating, dysuria and hematuria.   Musculoskeletal: Negative for arthralgias and gait problem.   Skin: Negative for pallor and skin lesions.   Neurological: Negative for dizziness, tremors, seizures, syncope, speech difficulty, weakness, memory problem and confusion.   Psychiatric/Behavioral: Negative for self-injury and depressed mood. The patient is not nervous/anxious.        Objective   Vital Signs:   /72 (BP Location: Right arm, Patient Position: Sitting, Cuff Size: Adult)   Temp 97 °F (36.1 °C)   Ht 154.9 cm (60.98\")   Wt 110 kg (242 lb 6.4 oz)   BMI 45.83 kg/m²           Physical Exam  Vitals and nursing note reviewed.   Constitutional:       General: She is not in acute distress.     Appearance: Normal appearance. She is not toxic-appearing.   HENT:      Head: Atraumatic.      Right Ear: External ear normal.      Left Ear: External " ear normal.      Nose: Nose normal.      Mouth/Throat:      Mouth: Mucous membranes are moist.   Eyes:      General:         Right eye: No discharge.         Left eye: No discharge.      Extraocular Movements: Extraocular movements intact.      Pupils: Pupils are equal, round, and reactive to light.   Cardiovascular:      Rate and Rhythm: Normal rate and regular rhythm.      Pulses: Normal pulses.      Heart sounds: Normal heart sounds. No murmur heard.    No gallop.   Pulmonary:      Effort: Pulmonary effort is normal. No respiratory distress.      Breath sounds: No wheezing, rhonchi or rales.   Abdominal:      General: There is no distension.      Palpations: Abdomen is soft. There is no mass.      Tenderness: There is no abdominal tenderness. There is no guarding.   Musculoskeletal:         General: No swelling or tenderness.      Cervical back: No tenderness.      Right lower leg: No edema.      Left lower leg: No edema.   Skin:     General: Skin is warm and dry.      Findings: No rash.   Neurological:      General: No focal deficit present.      Mental Status: She is alert and oriented to person, place, and time. Mental status is at baseline.      Motor: No weakness.      Gait: Gait normal.   Psychiatric:         Mood and Affect: Mood normal.         Thought Content: Thought content normal.          Result Review :   The following data was reviewed by: Savage Watts MD on 07/13/2021:                  Assessment and Plan    Diagnoses and all orders for this visit:    1. Essential hypertension (Primary)  Assessment & Plan:  Blood pressures well controlled as of her 9/22 office visit.  She stable to continue full dose Tenormin and low-dose lisinopril.    Orders:  -     Basic Metabolic Panel; Future    2. Mixed hyperlipidemia  Assessment & Plan:  LDL of 53 is easily at goal as of her 9/22 office visit.  Patient is stable to continue moderate dose simvastatin.      3. Stage 3a chronic kidney disease  "([facility])  Assessment & Plan:  GFR is stable at 59 as of her 9/22 office visit.  We will continue to monitor.  She is on low-dose ACE inhibitor as well as full dose Jardiance, continue same.    Orders:  -     Basic Metabolic Panel; Future    4. Type 2 diabetes mellitus without complication, without long-term current use of insulin ([facility])  Assessment & Plan:  A1c is down nicely from 7.5 to 7.0 as of her 9/22 office visit.  Patient adjusted the timing of her medications and has been very consistent with them.  She also got 10 or 11 pounds off over the past 3 to 4 months.  No changes indicated at this time, she is to continue with full doses of Actos, metformin, Glucotrol, and Jardiance.    Orders:  -     Hemoglobin A1c; Future    5. Gastroesophageal reflux disease without esophagitis  Assessment & Plan:  Patient with no dysphagia or persistent dyspepsia on chronic PPI as of 9/22. Stable to continue low-dose omeprazole.      6. Morbid obesity ([facility])  Overview:  BMI is  down from 48 in 3/22 to 46 as of 9/22.    Patient is aware of conservative dietary restrictions that are needed,  and activity as tolerated.  Continue same trend please.     (TSH was normal 9/22).      7. Vitamin D deficiency  Assessment & Plan:  Vitamin D of 49 is stable as of 9/22 office visit.  Patient will continue low-dose over-the-counter supplementation.  BMD per GYN.       --  --  OLDER NOTES:  VISIT 3/21:  ANNUAL PHYSICAL 2/20:  --  DM with A1C 7.8 on less than prescribed meds b/c was running low; d/w wt loss is goal here and call if low spells; to [place] as well to help with diet (micro-alb neg 1/17)...7.3 is good, but goal is 6.8 given age...7.4 \" b/c of my foot and I couldn't exercise\"; will see what Jardiance will cost and try to wean glipizide...6.8 is good drop and is down to 2/1 of glipizide, so increase jardiance now and try 1/1 or 2/0 if drops again...7.7 despite increased Jardaince, so needs 2/1 again and/or get some wt off; agree with new " monitor as well...7.2 and rec stay on this dose and diet please (d/w reason for wanting to get off glipizide)...7.4...is up due to being off feet for 6 weeks=8.0, so will try 45 mg actos if no worse swelling...7.1 and d/w yes, she can play with glipizide since having lows...7.3 in 6/20 is fine since only 1/2 dose glipizide now=ditto 9/20 = 7.2---> 7.2 is steady 3/21. (TSH neg 9/20).  OPTHO neg 2/21; MICROALB=  --  HTN remains well controlled. 3/21.  ? [id number]=43% in 3/21 = no concerns yet.  --  LIPIDS at goal with LDL 69...67---> 88 in 9/20.  SPECT 7/16 with EF 50% and no ischemia (FH CHF=B MI at age 41).  --  FE DEF ANEMIA is up 11.5 to 12.4 and d/w stay on same MVI as of 5/17 OV...13.1 with lowish iron, stay on MVI...stable=defer a while after 2/19 OV---> all labs stable as of 9/20 = stay on MVI.  --  ELEVATED LFTS=wnl as of 1/17... ditto.  GERD w/o dysphagia on PPI and I rec trial of qod; (s/p prior dilation)  --  VIT D DEF=fine 9/20.  BMD neg per Dr Cuellar age 61 and she is scheduled for one soon as of her 10/21 office visit.  --  S/P FALL 10/17 = fell off stool, to [facility], had HCT, balance and paresthesia's since; exam non-focal, neg FTN, neg rhomberg; will get dopplers and review the cat scan, but o/w no tx rec...Dopplers 2/18 were fine; will send to ENT due to sxs with turning head...?  S/P FALL 7/19 = RLE major swelling, had VELE=neg; saw Ortho and they sent to PT...fell again, had MRI=R tibeal plateau fx and was in brace for 6 weeks per Dr Krishnan; has f/u with him before PT to resume; I d/w try replace aleve with tylenol arthritis.  --  MORBID KQOBWEB=396 is stuck (TSH neg 2/20).  --  --  MMG 9/10/2021 per Dr Mac.  COLON 1/22 = polyp x1 = 5 years per Dr. Muir.  Pneumo #1 '01, Prevnar '16; Shingrix x1/Hep A pending;   COVID x3 with Pfizer, last was 9/24/2021.  Flu shot for the 2021 season completed 10/8/2021 at [employer].  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    Follow Up   Return in about 4 months (around 1/27/2023).  Patient was given instructions and counseling regarding her condition or for health maintenance advice. Please see specific information pulled into the AVS if appropriate.

## 2022-09-27 ENCOUNTER — OFFICE VISIT (OUTPATIENT)
Dept: INTERNAL MEDICINE | Facility: CLINIC | Age: 67
End: 2022-09-27

## 2022-09-27 VITALS
BODY MASS INDEX: 45.76 KG/M2 | TEMPERATURE: 97 F | SYSTOLIC BLOOD PRESSURE: 128 MMHG | WEIGHT: 242.4 LBS | DIASTOLIC BLOOD PRESSURE: 72 MMHG | HEIGHT: 61 IN

## 2022-09-27 DIAGNOSIS — K21.9 GASTROESOPHAGEAL REFLUX DISEASE WITHOUT ESOPHAGITIS: ICD-10-CM

## 2022-09-27 DIAGNOSIS — E78.2 MIXED HYPERLIPIDEMIA: ICD-10-CM

## 2022-09-27 DIAGNOSIS — I10 ESSENTIAL HYPERTENSION: Primary | ICD-10-CM

## 2022-09-27 DIAGNOSIS — E55.9 VITAMIN D DEFICIENCY: ICD-10-CM

## 2022-09-27 DIAGNOSIS — E11.9 TYPE 2 DIABETES MELLITUS WITHOUT COMPLICATION, WITHOUT LONG-TERM CURRENT USE OF INSULIN: ICD-10-CM

## 2022-09-27 DIAGNOSIS — N18.31 STAGE 3A CHRONIC KIDNEY DISEASE: ICD-10-CM

## 2022-09-27 DIAGNOSIS — E66.01 MORBID OBESITY: ICD-10-CM

## 2022-09-27 PROCEDURE — 99214 OFFICE O/P EST MOD 30 MIN: CPT | Performed by: INTERNAL MEDICINE

## 2022-09-27 NOTE — ASSESSMENT & PLAN NOTE
Patient with no dysphagia or persistent dyspepsia on chronic PPI as of 9/22. Stable to continue low-dose omeprazole.

## 2022-09-27 NOTE — PATIENT INSTRUCTIONS
The new COVID-vaccine to look for in a couple of months is referred to as the bivalent vaccine, it covers more than one version of the coronavirus.  Current recommendations are to just receive 1 dose of this at the present time.

## 2022-09-27 NOTE — ASSESSMENT & PLAN NOTE
GFR is stable at 59 as of her 9/22 office visit.  We will continue to monitor.  She is on low-dose ACE inhibitor as well as full dose Jardiance, continue same.

## 2022-09-27 NOTE — ASSESSMENT & PLAN NOTE
A1c is down nicely from 7.5 to 7.0 as of her 9/22 office visit.  Patient adjusted the timing of her medications and has been very consistent with them.  She also got 10 or 11 pounds off over the past 3 to 4 months.  No changes indicated at this time, she is to continue with full doses of Actos, metformin, Glucotrol, and Jardiance.

## 2022-09-27 NOTE — ASSESSMENT & PLAN NOTE
Vitamin D of 49 is stable as of 9/22 office visit.  Patient will continue low-dose over-the-counter supplementation.  BMD per GYN.

## 2022-09-27 NOTE — ASSESSMENT & PLAN NOTE
LDL of 53 is easily at goal as of her 9/22 office visit.  Patient is stable to continue moderate dose simvastatin.

## 2022-09-27 NOTE — ASSESSMENT & PLAN NOTE
Blood pressures well controlled as of her 9/22 office visit.  She stable to continue full dose Tenormin and low-dose lisinopril.

## 2022-09-29 RX ORDER — OMEPRAZOLE 20 MG/1
CAPSULE, DELAYED RELEASE ORAL
Qty: 90 CAPSULE | Refills: 1 | Status: SHIPPED | OUTPATIENT
Start: 2022-09-29 | End: 2023-03-30

## 2022-09-29 RX ORDER — GLIPIZIDE 10 MG/1
TABLET ORAL
Qty: 180 TABLET | Refills: 1 | Status: SHIPPED | OUTPATIENT
Start: 2022-09-29 | End: 2023-03-30

## 2022-09-29 RX ORDER — PIOGLITAZONEHYDROCHLORIDE 45 MG/1
TABLET ORAL
Qty: 90 TABLET | Refills: 1 | Status: SHIPPED | OUTPATIENT
Start: 2022-09-29 | End: 2023-03-30

## 2022-09-29 RX ORDER — ATENOLOL 100 MG/1
TABLET ORAL
Qty: 90 TABLET | Refills: 1 | Status: SHIPPED | OUTPATIENT
Start: 2022-09-29 | End: 2023-03-30

## 2022-09-29 RX ORDER — SIMVASTATIN 40 MG
TABLET ORAL
Qty: 90 TABLET | Refills: 1 | Status: SHIPPED | OUTPATIENT
Start: 2022-09-29 | End: 2023-03-30

## 2022-09-29 RX ORDER — EMPAGLIFLOZIN 25 MG/1
TABLET, FILM COATED ORAL
Qty: 90 TABLET | Refills: 1 | Status: SHIPPED | OUTPATIENT
Start: 2022-09-29 | End: 2023-03-30

## 2022-09-29 RX ORDER — LISINOPRIL 10 MG/1
TABLET ORAL
Qty: 90 TABLET | Refills: 1 | Status: SHIPPED | OUTPATIENT
Start: 2022-09-29 | End: 2023-03-30

## 2023-01-25 ENCOUNTER — LAB (OUTPATIENT)
Dept: LAB | Facility: HOSPITAL | Age: 68
End: 2023-01-25
Payer: MEDICARE

## 2023-01-25 DIAGNOSIS — N18.31 STAGE 3A CHRONIC KIDNEY DISEASE: ICD-10-CM

## 2023-01-25 DIAGNOSIS — I10 ESSENTIAL HYPERTENSION: ICD-10-CM

## 2023-01-25 DIAGNOSIS — E11.9 TYPE 2 DIABETES MELLITUS WITHOUT COMPLICATION, WITHOUT LONG-TERM CURRENT USE OF INSULIN: ICD-10-CM

## 2023-01-25 LAB
ANION GAP SERPL CALCULATED.3IONS-SCNC: 8.3 MMOL/L (ref 5–15)
BUN SERPL-MCNC: 17 MG/DL (ref 8–23)
BUN/CREAT SERPL: 15.9 (ref 7–25)
CALCIUM SPEC-SCNC: 9.8 MG/DL (ref 8.6–10.5)
CHLORIDE SERPL-SCNC: 101 MMOL/L (ref 98–107)
CO2 SERPL-SCNC: 26.7 MMOL/L (ref 22–29)
CREAT SERPL-MCNC: 1.07 MG/DL (ref 0.57–1)
EGFRCR SERPLBLD CKD-EPI 2021: 57.4 ML/MIN/1.73
GLUCOSE SERPL-MCNC: 110 MG/DL (ref 65–99)
HBA1C MFR BLD: 7.1 % (ref 4.8–5.6)
POTASSIUM SERPL-SCNC: 4.6 MMOL/L (ref 3.5–5.2)
SODIUM SERPL-SCNC: 136 MMOL/L (ref 136–145)

## 2023-01-25 PROCEDURE — 36415 COLL VENOUS BLD VENIPUNCTURE: CPT

## 2023-01-25 PROCEDURE — 80048 BASIC METABOLIC PNL TOTAL CA: CPT

## 2023-01-25 PROCEDURE — 83036 HEMOGLOBIN GLYCOSYLATED A1C: CPT

## 2023-02-01 NOTE — PROGRESS NOTES
"Chief Complaint  Hypertension and Follow-up (Pt states that this is routine, she had labs and she has no new issues. )    Subjective          Ashwini Lopez presents to [facility] INTER[facility]     History of present illness:  Patient is a 67-year-old female with underlying diabetes mellitus on oral agents, hypertension, hyperlipidemia, among others, who is here 2/23 for routine 4-month diabetic follow-up.  We will review her med list, go over her labs in detail, and address any new concerns she may have.    Review of Systems   Constitutional: Negative for appetite change, fever and unexpected weight loss.   HENT: Negative for congestion and ear pain.    Eyes: Negative for blurred vision.   Respiratory: Negative for cough, chest tightness, shortness of breath and wheezing.    Cardiovascular: Negative for chest pain, palpitations and leg swelling.   Gastrointestinal: Negative for abdominal pain.   Endocrine: Negative for polyphagia and polyuria.   Genitourinary: Negative for difficulty urinating, dysuria and hematuria.   Musculoskeletal: Negative for arthralgias and gait problem.   Skin: Negative for pallor and skin lesions.   Neurological: Negative for dizziness, tremors, seizures, syncope, speech difficulty, weakness, memory problem and confusion.   Psychiatric/Behavioral: Negative for self-injury and depressed mood. The patient is not nervous/anxious.        Objective   Vital Signs:   /80   Pulse 67   Temp 98.4 °F (36.9 °C) (Skin)   Ht 154.9 cm (60.98\")   Wt 109 kg (241 lb)   SpO2 93%   BMI 45.56 kg/m²           Physical Exam  Vitals and nursing note reviewed.   Constitutional:       General: She is not in acute distress.     Appearance: Normal appearance. She is not toxic-appearing.   HENT:      Head: Atraumatic.      Right Ear: External ear normal.      Left Ear: External ear normal.      Nose: Nose normal.      Mouth/Throat:      Mouth: Mucous membranes are moist.   Eyes:      " General:         Right eye: No discharge.         Left eye: No discharge.      Extraocular Movements: Extraocular movements intact.      Pupils: Pupils are equal, round, and reactive to light.   Cardiovascular:      Rate and Rhythm: Normal rate and regular rhythm.      Pulses: Normal pulses.      Heart sounds: Normal heart sounds. No murmur heard.    No gallop.   Pulmonary:      Effort: Pulmonary effort is normal. No respiratory distress.      Breath sounds: No wheezing, rhonchi or rales.   Abdominal:      General: There is no distension.      Palpations: Abdomen is soft. There is no mass.      Tenderness: There is no abdominal tenderness. There is no guarding.   Musculoskeletal:         General: No swelling or tenderness.      Cervical back: No tenderness.      Right lower leg: No edema.      Left lower leg: No edema.   Skin:     General: Skin is warm and dry.      Findings: No rash.   Neurological:      General: No focal deficit present.      Mental Status: She is alert and oriented to person, place, and time. Mental status is at baseline.      Motor: No weakness.      Gait: Gait normal.   Psychiatric:         Mood and Affect: Mood normal.         Thought Content: Thought content normal.          Result Review :   The following data was reviewed by: Savage Watts MD on 07/13/2021:                  Assessment and Plan    Diagnoses and all orders for this visit:    1. Essential hypertension (Primary)  Assessment & Plan:  Blood pressure very stable as of her 2/23 office visit.  She will continue with full dose Tenormin and low-dose lisinopril.  She is getting reasonable numbers at home as well, will let us know if otherwise.    Orders:  -     Comprehensive Metabolic Panel; Future    2. Mixed hyperlipidemia  Assessment & Plan:  LDL was at goal in 9/22, she continues with moderate dose simvastatin, will repeat labs in the late spring early summer.    Orders:  -     Lipid Panel; Future    3. Type 2 diabetes mellitus  "without complication, without long-term current use of insulin (HCC)  Assessment & Plan:  A1c of 7.1 is very stable over the holidays as of her 2/23 office visit.  She has The 10+ pounds off over this timeframe, so when the weather breaks, hopefully will get that trend going again.  We will keep current meds on board which include full doses of all 4 of them.    Orders:  -     Hemoglobin A1c; Future    4. Stage 3a chronic kidney disease (HCC)  Assessment & Plan:  GFR is fairly stable at 57 as of 2/23 office visit.  Electrolytes are fine as well.  Patient looks a little bit on the dry side, she is aware to push the water.  Continues on low-dose ACE and Jardiance as per her list.      5. Well adult exam  -     CBC & Differential; Future     --  --  OLDER NOTES:  VISIT 3/21:  ANNUAL PHYSICAL 2/20:  --  DM with A1C 7.8 on less than prescribed meds b/c was running low; d/w wt loss is goal here and call if low spells; to [place] as well to help with diet (micro-alb neg 1/17)...7.3 is good, but goal is 6.8 given age...7.4 \" b/c of my foot and I couldn't exercise\"; will see what Jardiance will cost and try to wean glipizide...6.8 is good drop and is down to 2/1 of glipizide, so increase jardiance now and try 1/1 or 2/0 if drops again...7.7 despite increased Jardaince, so needs 2/1 again and/or get some wt off; agree with new monitor as well...7.2 and rec stay on this dose and diet please (d/w reason for wanting to get off glipizide)...7.4...is up due to being off feet for 6 weeks=8.0, so will try 45 mg actos if no worse swelling...7.1 and d/w yes, she can play with glipizide since having lows...7.3 in 6/20 is fine since only 1/2 dose glipizide now=ditto 9/20 = 7.2---> 7.2 is steady 3/21. (TSH neg 9/20).  OPTHO neg 2/21; MICROALB=  --  HTN remains well controlled. 3/21.  ? UAD1p=55% in 3/21 = no concerns yet.  --  LIPIDS at goal with LDL 69...67---> 88 in 9/20.  SPECT 7/16 with EF 50% and no ischemia (FH CHF=B MI at age " 41).  --  FE DEF ANEMIA is up 11.5 to 12.4 and d/w stay on same MVI as of 5/17 OV...13.1 with lowish iron, stay on MVI...stable=defer a while after 2/19 OV---> all labs stable as of 9/20 = stay on MVI.  --  ELEVATED LFTS=wnl as of 1/17... ditto.  GERD w/o dysphagia on PPI and I rec trial of qod; (s/p prior dilation)  --  VIT D DEF=fine 9/20.  BMD neg per Dr Cuellar age 61 and she is scheduled for one soon as of her 10/21 office visit.  --  S/P FALL 10/17 = fell off stool, to [facility], had HCT, balance and paresthesia's since; exam non-focal, neg FTN, neg rhomberg; will get dopplers and review the cat scan, but o/w no tx rec...Dopplers 2/18 were fine; will send to ENT due to sxs with turning head...?  S/P FALL 7/19 = RLE major swelling, had VELE=neg; saw Ortho and they sent to PT...fell again, had MRI=R tibeal plateau fx and was in brace for 6 weeks per Dr Krishnan; has f/u with him before PT to resume; I d/w try replace aleve with tylenol arthritis.  --  MORBID LBIOUCV=032 is stuck (TSH neg 2/20).  --  --  MMG 9/14/22 per Dr Mac's NP.  COLON 1/22 = polyp x1 = 5 years per Dr. Muir.  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    Follow Up   Return in about 4 months (around 6/2/2023).  Patient was given instructions and counseling regarding her condition or for health maintenance advice. Please see specific information pulled into the AVS if appropriate.

## 2023-02-02 ENCOUNTER — OFFICE VISIT (OUTPATIENT)
Dept: INTERNAL MEDICINE | Facility: CLINIC | Age: 68
End: 2023-02-02
Payer: MEDICARE

## 2023-02-02 VITALS
HEART RATE: 67 BPM | DIASTOLIC BLOOD PRESSURE: 80 MMHG | OXYGEN SATURATION: 93 % | WEIGHT: 241 LBS | HEIGHT: 61 IN | SYSTOLIC BLOOD PRESSURE: 124 MMHG | BODY MASS INDEX: 45.5 KG/M2 | TEMPERATURE: 98.4 F

## 2023-02-02 DIAGNOSIS — E78.2 MIXED HYPERLIPIDEMIA: ICD-10-CM

## 2023-02-02 DIAGNOSIS — E11.9 TYPE 2 DIABETES MELLITUS WITHOUT COMPLICATION, WITHOUT LONG-TERM CURRENT USE OF INSULIN: ICD-10-CM

## 2023-02-02 DIAGNOSIS — I10 ESSENTIAL HYPERTENSION: Primary | ICD-10-CM

## 2023-02-02 DIAGNOSIS — N18.31 STAGE 3A CHRONIC KIDNEY DISEASE: ICD-10-CM

## 2023-02-02 DIAGNOSIS — Z00.00 WELL ADULT EXAM: ICD-10-CM

## 2023-02-02 PROCEDURE — 99214 OFFICE O/P EST MOD 30 MIN: CPT | Performed by: INTERNAL MEDICINE

## 2023-02-02 NOTE — ASSESSMENT & PLAN NOTE
GFR is fairly stable at 57 as of 2/23 office visit.  Electrolytes are fine as well.  Patient looks a little bit on the dry side, she is aware to push the water.  Continues on low-dose ACE and Jardiance as per her list.

## 2023-02-02 NOTE — ASSESSMENT & PLAN NOTE
A1c of 7.1 is very stable over the holidays as of her 2/23 office visit.  She has The 10+ pounds off over this timeframe, so when the weather breaks, hopefully will get that trend going again.  We will keep current meds on board which include full doses of all 4 of them.

## 2023-02-02 NOTE — ASSESSMENT & PLAN NOTE
Blood pressure very stable as of her 2/23 office visit.  She will continue with full dose Tenormin and low-dose lisinopril.  She is getting reasonable numbers at home as well, will let us know if otherwise.

## 2023-02-02 NOTE — ASSESSMENT & PLAN NOTE
LDL was at goal in 9/22, she continues with moderate dose simvastatin, will repeat labs in the late spring early summer.

## 2023-03-30 RX ORDER — ATENOLOL 100 MG/1
TABLET ORAL
Qty: 90 TABLET | Refills: 1 | Status: SHIPPED | OUTPATIENT
Start: 2023-03-30

## 2023-03-30 RX ORDER — EMPAGLIFLOZIN 25 MG/1
TABLET, FILM COATED ORAL
Qty: 90 TABLET | Refills: 1 | Status: SHIPPED | OUTPATIENT
Start: 2023-03-30

## 2023-03-30 RX ORDER — PIOGLITAZONEHYDROCHLORIDE 45 MG/1
TABLET ORAL
Qty: 90 TABLET | Refills: 1 | Status: SHIPPED | OUTPATIENT
Start: 2023-03-30

## 2023-03-30 RX ORDER — GLIPIZIDE 10 MG/1
TABLET ORAL
Qty: 180 TABLET | Refills: 1 | Status: SHIPPED | OUTPATIENT
Start: 2023-03-30

## 2023-03-30 RX ORDER — LISINOPRIL 10 MG/1
TABLET ORAL
Qty: 90 TABLET | Refills: 1 | Status: SHIPPED | OUTPATIENT
Start: 2023-03-30

## 2023-03-30 RX ORDER — SIMVASTATIN 40 MG
TABLET ORAL
Qty: 90 TABLET | Refills: 1 | Status: SHIPPED | OUTPATIENT
Start: 2023-03-30

## 2023-03-30 RX ORDER — OMEPRAZOLE 20 MG/1
CAPSULE, DELAYED RELEASE ORAL
Qty: 90 CAPSULE | Refills: 1 | Status: SHIPPED | OUTPATIENT
Start: 2023-03-30

## 2023-05-31 ENCOUNTER — LAB (OUTPATIENT)
Dept: LAB | Facility: HOSPITAL | Age: 68
End: 2023-05-31

## 2023-05-31 DIAGNOSIS — I10 ESSENTIAL HYPERTENSION: ICD-10-CM

## 2023-05-31 DIAGNOSIS — E78.2 MIXED HYPERLIPIDEMIA: ICD-10-CM

## 2023-05-31 DIAGNOSIS — E11.9 TYPE 2 DIABETES MELLITUS WITHOUT COMPLICATION, WITHOUT LONG-TERM CURRENT USE OF INSULIN: ICD-10-CM

## 2023-05-31 DIAGNOSIS — Z00.00 WELL ADULT EXAM: ICD-10-CM

## 2023-05-31 LAB
ALBUMIN SERPL-MCNC: 4.2 G/DL (ref 3.5–5.2)
ALBUMIN/GLOB SERPL: 1.9 G/DL
ALP SERPL-CCNC: 65 U/L (ref 39–117)
ALT SERPL W P-5'-P-CCNC: 19 U/L (ref 1–33)
ANION GAP SERPL CALCULATED.3IONS-SCNC: 10 MMOL/L (ref 5–15)
AST SERPL-CCNC: 23 U/L (ref 1–32)
BASOPHILS # BLD AUTO: 0.08 10*3/MM3 (ref 0–0.2)
BASOPHILS NFR BLD AUTO: 0.9 % (ref 0–1.5)
BILIRUB SERPL-MCNC: 0.3 MG/DL (ref 0–1.2)
BUN SERPL-MCNC: 21 MG/DL (ref 8–23)
BUN/CREAT SERPL: 18.3 (ref 7–25)
CALCIUM SPEC-SCNC: 9.1 MG/DL (ref 8.6–10.5)
CHLORIDE SERPL-SCNC: 99 MMOL/L (ref 98–107)
CHOLEST SERPL-MCNC: 151 MG/DL (ref 0–200)
CO2 SERPL-SCNC: 27 MMOL/L (ref 22–29)
CREAT SERPL-MCNC: 1.15 MG/DL (ref 0.57–1)
DEPRECATED RDW RBC AUTO: 39.2 FL (ref 37–54)
EGFRCR SERPLBLD CKD-EPI 2021: 52.6 ML/MIN/1.73
EOSINOPHIL # BLD AUTO: 0.45 10*3/MM3 (ref 0–0.4)
EOSINOPHIL NFR BLD AUTO: 5.1 % (ref 0.3–6.2)
ERYTHROCYTE [DISTWIDTH] IN BLOOD BY AUTOMATED COUNT: 12.7 % (ref 12.3–15.4)
GLOBULIN UR ELPH-MCNC: 2.2 GM/DL
GLUCOSE SERPL-MCNC: 99 MG/DL (ref 65–99)
HBA1C MFR BLD: 7.2 % (ref 4.8–5.6)
HCT VFR BLD AUTO: 40 % (ref 34–46.6)
HDLC SERPL-MCNC: 57 MG/DL (ref 40–60)
HGB BLD-MCNC: 12.9 G/DL (ref 12–15.9)
IMM GRANULOCYTES # BLD AUTO: 0.04 10*3/MM3 (ref 0–0.05)
IMM GRANULOCYTES NFR BLD AUTO: 0.5 % (ref 0–0.5)
LDLC SERPL CALC-MCNC: 73 MG/DL (ref 0–100)
LDLC/HDLC SERPL: 1.23 {RATIO}
LYMPHOCYTES # BLD AUTO: 2.97 10*3/MM3 (ref 0.7–3.1)
LYMPHOCYTES NFR BLD AUTO: 33.7 % (ref 19.6–45.3)
MCH RBC QN AUTO: 27.7 PG (ref 26.6–33)
MCHC RBC AUTO-ENTMCNC: 32.3 G/DL (ref 31.5–35.7)
MCV RBC AUTO: 85.8 FL (ref 79–97)
MONOCYTES # BLD AUTO: 0.73 10*3/MM3 (ref 0.1–0.9)
MONOCYTES NFR BLD AUTO: 8.3 % (ref 5–12)
NEUTROPHILS NFR BLD AUTO: 4.55 10*3/MM3 (ref 1.7–7)
NEUTROPHILS NFR BLD AUTO: 51.5 % (ref 42.7–76)
NRBC BLD AUTO-RTO: 0.1 /100 WBC (ref 0–0.2)
PLATELET # BLD AUTO: 324 10*3/MM3 (ref 140–450)
PMV BLD AUTO: 10.5 FL (ref 6–12)
POTASSIUM SERPL-SCNC: 4.7 MMOL/L (ref 3.5–5.2)
PROT SERPL-MCNC: 6.4 G/DL (ref 6–8.5)
RBC # BLD AUTO: 4.66 10*6/MM3 (ref 3.77–5.28)
SODIUM SERPL-SCNC: 136 MMOL/L (ref 136–145)
TRIGL SERPL-MCNC: 120 MG/DL (ref 0–150)
VLDLC SERPL-MCNC: 21 MG/DL (ref 5–40)
WBC NRBC COR # BLD: 8.82 10*3/MM3 (ref 3.4–10.8)

## 2023-05-31 PROCEDURE — 83036 HEMOGLOBIN GLYCOSYLATED A1C: CPT

## 2023-05-31 PROCEDURE — 80053 COMPREHEN METABOLIC PANEL: CPT

## 2023-05-31 PROCEDURE — 85025 COMPLETE CBC W/AUTO DIFF WBC: CPT

## 2023-05-31 PROCEDURE — 80061 LIPID PANEL: CPT

## 2023-05-31 PROCEDURE — 36415 COLL VENOUS BLD VENIPUNCTURE: CPT

## 2023-06-06 ENCOUNTER — OFFICE VISIT (OUTPATIENT)
Dept: INTERNAL MEDICINE | Facility: CLINIC | Age: 68
End: 2023-06-06
Payer: MEDICARE

## 2023-06-06 ENCOUNTER — HOSPITAL ENCOUNTER (OUTPATIENT)
Dept: GENERAL RADIOLOGY | Facility: HOSPITAL | Age: 68
Discharge: HOME | End: 2023-06-06
Admitting: INTERNAL MEDICINE
Payer: MEDICARE

## 2023-06-06 VITALS
SYSTOLIC BLOOD PRESSURE: 118 MMHG | DIASTOLIC BLOOD PRESSURE: 70 MMHG | WEIGHT: 252 LBS | TEMPERATURE: 97.1 F | BODY MASS INDEX: 47.58 KG/M2 | OXYGEN SATURATION: 98 % | HEIGHT: 61 IN | HEART RATE: 68 BPM

## 2023-06-06 DIAGNOSIS — I10 ESSENTIAL HYPERTENSION: ICD-10-CM

## 2023-06-06 DIAGNOSIS — E78.2 MIXED HYPERLIPIDEMIA: ICD-10-CM

## 2023-06-06 DIAGNOSIS — N18.31 STAGE 3A CHRONIC KIDNEY DISEASE: ICD-10-CM

## 2023-06-06 DIAGNOSIS — Z00.00 MEDICARE ANNUAL WELLNESS VISIT, SUBSEQUENT: Primary | ICD-10-CM

## 2023-06-06 DIAGNOSIS — M25.552 LEFT HIP PAIN: ICD-10-CM

## 2023-06-06 DIAGNOSIS — E11.9 TYPE 2 DIABETES MELLITUS WITHOUT COMPLICATION, WITHOUT LONG-TERM CURRENT USE OF INSULIN: ICD-10-CM

## 2023-06-06 PROBLEM — D50.9 IRON DEFICIENCY ANEMIA: Status: RESOLVED | Noted: 2021-07-13 | Resolved: 2023-06-06

## 2023-06-06 PROCEDURE — 73502 X-RAY EXAM HIP UNI 2-3 VIEWS: CPT

## 2023-06-06 RX ORDER — SEMAGLUTIDE 1.34 MG/ML
0.25 INJECTION, SOLUTION SUBCUTANEOUS WEEKLY
Qty: 1.5 ML | Refills: 1 | Status: SHIPPED | OUTPATIENT
Start: 2023-06-06

## 2023-06-06 RX ORDER — METHYLPREDNISOLONE 4 MG/1
TABLET ORAL
Qty: 21 EACH | Refills: 0 | Status: SHIPPED | OUTPATIENT
Start: 2023-06-06 | End: 2023-06-11

## 2023-06-06 NOTE — ASSESSMENT & PLAN NOTE
CRISTAL completed 6/23.  Patient remains active and independent even though she is having some issues with her left hip.  No falls and no hospitalizations past year.  She does have a living will and she will get us a copy here at her next appointment.

## 2023-06-06 NOTE — PROGRESS NOTES
The ABCs of the Annual Wellness Visit  Subsequent Medicare Wellness Visit    Subjective      Ashwini Lopez is a 68 y.o. female who presents for a Subsequent Medicare Wellness Visit.    The following portions of the patient's history were reviewed and   updated as appropriate: allergies, current medications, past family history, past medical history, past social history, past surgical history, and problem list.    Compared to one year ago, the patient feels her physical   health is the same.    Compared to one year ago, the patient feels her mental   health is the same.    Recent Hospitalizations:  She was not admitted to the hospital during the last year.       Current Medical Providers:  Patient Care Team:  Savage Watts MD as PCP - General (Internal Medicine)    Outpatient Medications Prior to Visit   Medication Sig Dispense Refill    acetaminophen (TYLENOL) 650 MG 8 hr tablet 1 tablet Every 8 (Eight) Hours As Needed.      aspirin 81 MG EC tablet Take 1 tablet by mouth Daily.      atenolol (TENORMIN) 100 MG tablet TAKE 1 TABLET DAILY 90 tablet 1    calcium carb-cholecalciferol 600-800 MG-UNIT tablet 1 tablet.      Cetirizine HCl (ZyrTEC Allergy) 10 MG capsule Take 1 tablet by mouth Daily.      glipizide (GLUCOTROL) 10 MG tablet TAKE 1 TABLET TWICE DAILY  BEFORE MEALS 180 tablet 1    Jardiance 25 MG tablet tablet TAKE 1 TABLET DAILY 90 tablet 1    lisinopril (PRINIVIL,ZESTRIL) 10 MG tablet TAKE 1 TABLET DAILY 90 tablet 1    metFORMIN (GLUCOPHAGE) 1000 MG tablet TAKE 1 TABLET TWICE A  tablet 1    Multiple Vitamins-Iron (multivitamin with iron) tablet tablet 1 tablet.      Multiple Vitamins-Minerals (PRESERVISION AREDS 2 PO) 1 tablet.      omeprazole (priLOSEC) 20 MG capsule TAKE 1 CAPSULE DAILY 90 capsule 1    pioglitazone (ACTOS) 45 MG tablet TAKE 1 TABLET DAILY 90 tablet 1    simvastatin (ZOCOR) 40 MG tablet TAKE 1 TABLET DAILY 90 tablet 1     No facility-administered medications prior to visit.       No  "opioid medication identified on active medication list. I have reviewed chart for other potential  high risk medication/s and harmful drug interactions in the elderly.        Aspirin is on active medication list. Aspirin use is indicated based on review of current medical condition/s. Pros and cons of this therapy have been discussed today. Benefits of this medication outweigh potential harm.  Patient has been encouraged to continue taking this medication.  .      Patient Active Problem List   Diagnosis    Essential hypertension    Mixed hyperlipidemia    Morbid obesity (HCC)    Vitamin D deficiency    Type 2 diabetes mellitus without complication, without long-term current use of insulin    Gastroesophageal reflux disease without esophagitis    Stage 3a chronic kidney disease    Adenomatous polyp of transverse colon    Medicare annual wellness visit, subsequent     Advance Care Planning   Advance Care Planning     Advance Directive is not on file.  ACP discussion was held with the patient during this visit. Patient has an advance directive (not in EMR), copy requested.     Objective    Vitals:    06/06/23 1250   BP: 118/70   Pulse: 68   Temp: 97.1 °F (36.2 °C)   TempSrc: Skin   SpO2: 98%   Weight: 114 kg (252 lb)   Height: 154.9 cm (60.98\")     Estimated body mass index is 47.64 kg/m² as calculated from the following:    Height as of this encounter: 154.9 cm (60.98\").    Weight as of this encounter: 114 kg (252 lb).    Class 3 Severe Obesity (BMI >=40). Obesity-related health conditions include the following: hypertension, diabetes mellitus, dyslipidemias, and osteoarthritis. Obesity is worsening. BMI is is above average; BMI management plan is completed. We discussed portion control, increasing exercise, and pharmacologic options including Ozempic .      Does the patient have evidence of cognitive impairment?   No    Lab Results   Component Value Date    TRIG 120 05/31/2023    HDL 57 05/31/2023    LDL 73 " 2023    VLDL 21 2023    HGBA1C 7.20 (H) 2023          HEALTH RISK ASSESSMENT    Smoking Status:  Social History     Tobacco Use   Smoking Status Never   Smokeless Tobacco Never     Alcohol Consumption:  Social History     Substance and Sexual Activity   Alcohol Use Not Currently     Fall Risk Screen:    ERICA Fall Risk Assessment was completed, and patient is at LOW risk for falls.Assessment completed on:2023    Depression Screenin/6/2023    12:52 PM   PHQ-2/PHQ-9 Depression Screening   Little Interest or Pleasure in Doing Things 0-->not at all   Feeling Down, Depressed or Hopeless 0-->not at all   PHQ-9: Brief Depression Severity Measure Score 0       Health Habits and Functional and Cognitive Screenin/6/2023    12:00 PM   Functional & Cognitive Status   Do you have difficulty preparing food and eating? No   Do you have difficulty bathing yourself, getting dressed or grooming yourself? No   Do you have difficulty using the toilet? No   Do you have difficulty moving around from place to place? No   Do you have trouble with steps or getting out of a bed or a chair? No   Current Diet Well Balanced Diet   Do you need help using the phone?  No   Are you deaf or do you have serious difficulty hearing?  No   Do you need help with transportation? No   Do you need help shopping? No   Do you need help preparing meals?  No   Do you need help with housework?  No   Do you need help with laundry? No   Do you need help taking your medications? No   Do you need help managing money? No   Do you ever drive or ride in a car without wearing a seat belt? No   Do you have difficulty concentrating, remembering or making decisions? No       Age-appropriate Screening Schedule:  Refer to the list below for future screening recommendations based on patient's age, sex and/or medical conditions. Orders for these recommended tests are listed in the plan section. The patient has been provided with a  written plan.    Health Maintenance   Topic Date Due    TDAP/TD VACCINES (1 - Tdap) Never done    ANNUAL WELLNESS VISIT  Never done    DIABETIC FOOT EXAM  Never done    COVID-19 Vaccine (6 - Pfizer series) 09/04/2023 (Originally 3/7/2023)    URINE MICROALBUMIN  06/24/2023    INFLUENZA VACCINE  08/01/2023    DXA SCAN  11/05/2023    HEMOGLOBIN A1C  11/30/2023    DIABETIC EYE EXAM  02/28/2024    LIPID PANEL  05/31/2024    MAMMOGRAM  09/14/2024    COLORECTAL CANCER SCREENING  01/21/2027    HEPATITIS C SCREENING  Completed    Pneumococcal Vaccine 65+  Completed    ZOSTER VACCINE  Completed                  CMS Preventative Services Quick Reference  Risk Factors Identified During Encounter:    None Identified    The above risks/problems have been discussed with the patient.  Pertinent information has been shared with the patient in the After Visit Summary.    Diagnoses and all orders for this visit:    1. Medicare annual wellness visit, subsequent (Primary)  Assessment & Plan:  AWV completed 6/23.  Patient remains active and independent even though she is having some issues with her left hip.  No falls and no hospitalizations past year.  She does have a living will and she will get us a copy here at her next appointment.        2. Type 2 diabetes mellitus without complication, without long-term current use of insulin  Assessment & Plan:  A1c is fairly stable as of her 6/23 office visit, it is 7.2.  She is having some lows overnight so we discussed trying just half tablet of glipizide in the evening.  Otherwise she will continue with full doses of Jardiance, metformin, and Actos.  Additionally, we will try low-dose Ozempic at this time if insurance allows, and patient will wean down accordingly with glipizide as she titrates the Ozempic dose.    Orders:  -     Hemoglobin A1c; Future    3. Stage 3a chronic kidney disease  Assessment & Plan:  GFR has trended down a little to the low 50s as of 6/23.  This is likely related to  some volume contraction given the rise in her BUN over the same timeframe.  We will watch this closely since she is requiring some nonsteroidals for left hip pain.    Orders:  -     Basic Metabolic Panel; Future    4. Mixed hyperlipidemia  Assessment & Plan:  LDL is stable at 73 as of 6/23 office visit.  She can continue with just moderate dose simvastatin.      5. Essential hypertension  Assessment & Plan:  Blood pressure remained stable as of 6/23 office visit.  She can continue with full dose Tenormin and low-dose lisinopril.  Her pulse is 68.      Other orders  -     Semaglutide,0.25 or 0.5MG/DOS, (Ozempic, 0.25 or 0.5 MG/DOSE,) 2 MG/1.5ML solution pen-injector; Inject 0.25 mg under the skin into the appropriate area as directed 1 (One) Time Per Week.  Dispense: 1.5 mL; Refill: 1        Follow Up:   Next Medicare Wellness visit to be scheduled in 1 year.      An After Visit Summary and PPPS were made available to the patient.

## 2023-06-06 NOTE — ASSESSMENT & PLAN NOTE
LDL is stable at 73 as of 6/23 office visit.  She can continue with just moderate dose simvastatin.

## 2023-06-06 NOTE — ASSESSMENT & PLAN NOTE
Blood pressure remained stable as of 6/23 office visit.  She can continue with full dose Tenormin and low-dose lisinopril.  Her pulse is 68.

## 2023-06-06 NOTE — ASSESSMENT & PLAN NOTE
A1c is fairly stable as of her 6/23 office visit, it is 7.2.  She is having some lows overnight so we discussed trying just half tablet of glipizide in the evening.  Otherwise she will continue with full doses of Jardiance, metformin, and Actos.  Additionally, we will try low-dose Ozempic at this time if insurance allows, and patient will wean down accordingly with glipizide as she titrates the Ozempic dose.

## 2023-06-06 NOTE — PROGRESS NOTES
"Chief Complaint  Diabetes, Follow-up (Pt states that this is routine, she had labs. ), and Pain (In left hip, she states that she has a hard pouch there, it is hot to touch at times, it is some tender. She states that she has burning and pain. She states that this has been going on since April. She had been taking Ibuprofen and naproxen, but she noticed her kidney function was decreased so she has stopped. )    Subjective          Ashwini Lopez presents to [facility]     History of present illness:  Patient is a 68-year-old female with underlying diabetes mellitus on oral agents, hypertension, hyperlipidemia, among others, who is here 6/23 for routine 4-month diabetic follow-up.  We will review her med list, go over her labs in detail, and address any new concerns she may have.    Review of Systems   Constitutional:  Negative for appetite change, fever and unexpected weight loss.   HENT:  Negative for congestion and ear pain.    Eyes:  Negative for blurred vision.   Respiratory:  Negative for cough, chest tightness, shortness of breath and wheezing.    Cardiovascular:  Negative for chest pain, palpitations and leg swelling.   Gastrointestinal:  Negative for abdominal pain.   Endocrine: Negative for polyphagia and polyuria.   Genitourinary:  Negative for difficulty urinating, dysuria and hematuria.   Musculoskeletal:  Negative for arthralgias and gait problem.   Skin:  Negative for pallor and skin lesions.   Neurological:  Negative for dizziness, tremors, seizures, syncope, speech difficulty, weakness, memory problem and confusion.   Psychiatric/Behavioral:  Negative for self-injury and depressed mood. The patient is not nervous/anxious.      Objective   Vital Signs:   /70   Pulse 68   Temp 97.1 °F (36.2 °C) (Skin)   Ht 154.9 cm (60.98\")   Wt 114 kg (252 lb)   SpO2 98%   BMI 47.64 kg/m²           Physical Exam  Vitals and nursing note reviewed.   Constitutional:       " General: She is not in acute distress.     Appearance: Normal appearance. She is not toxic-appearing.   HENT:      Head: Atraumatic.      Right Ear: External ear normal.      Left Ear: External ear normal.      Nose: Nose normal.      Mouth/Throat:      Mouth: Mucous membranes are moist.   Eyes:      General:         Right eye: No discharge.         Left eye: No discharge.      Extraocular Movements: Extraocular movements intact.      Pupils: Pupils are equal, round, and reactive to light.   Cardiovascular:      Rate and Rhythm: Normal rate and regular rhythm.      Pulses: Normal pulses.      Heart sounds: Normal heart sounds. No murmur heard.    No gallop.   Pulmonary:      Effort: Pulmonary effort is normal. No respiratory distress.      Breath sounds: No wheezing, rhonchi or rales.   Abdominal:      General: There is no distension.      Palpations: Abdomen is soft. There is no mass.      Tenderness: There is no abdominal tenderness. There is no guarding.   Musculoskeletal:         General: No swelling or tenderness.      Cervical back: No tenderness.      Right lower leg: No edema.      Left lower leg: No edema.   Skin:     General: Skin is warm and dry.      Findings: No rash.   Neurological:      General: No focal deficit present.      Mental Status: She is alert and oriented to person, place, and time. Mental status is at baseline.      Motor: No weakness.      Gait: Gait normal.   Psychiatric:         Mood and Affect: Mood normal.         Thought Content: Thought content normal.        Result Review :   The following data was reviewed by: Savage Watts MD on 07/13/2021:                  Assessment and Plan    Diagnoses and all orders for this visit:    1. Medicare annual wellness visit, subsequent (Primary)  Assessment & Plan:  AWV completed 6/23.  Patient remains active and independent even though she is having some issues with her left hip.  No falls and no hospitalizations past year.  She does have a  living will and she will get us a copy here at her next appointment.        2. Type 2 diabetes mellitus without complication, without long-term current use of insulin  Assessment & Plan:  A1c is fairly stable as of her 6/23 office visit, it is 7.2.  She is having some lows overnight so we discussed trying just half tablet of glipizide in the evening.  Otherwise she will continue with full doses of Jardiance, metformin, and Actos.  Additionally, we will try low-dose Ozempic at this time if insurance allows, and patient will wean down accordingly with glipizide as she titrates the Ozempic dose.    Orders:  -     Hemoglobin A1c; Future    3. Stage 3a chronic kidney disease  Assessment & Plan:  GFR has trended down a little to the low 50s as of 6/23.  This is likely related to some volume contraction given the rise in her BUN over the same timeframe.  We will watch this closely since she is requiring some nonsteroidals for left hip pain.    Orders:  -     Basic Metabolic Panel; Future    4. Mixed hyperlipidemia  Assessment & Plan:  LDL is stable at 73 as of 6/23 office visit.  She can continue with just moderate dose simvastatin.      5. Essential hypertension  Assessment & Plan:  Blood pressure remained stable as of 6/23 office visit.  She can continue with full dose Tenormin and low-dose lisinopril.  Her pulse is 68.      6. Left hip pain  -     XR Hip With or Without Pelvis 2 - 3 View Left; Future    Other orders  -     Semaglutide,0.25 or 0.5MG/DOS, (Ozempic, 0.25 or 0.5 MG/DOSE,) 2 MG/1.5ML solution pen-injector; Inject 0.25 mg under the skin into the appropriate area as directed 1 (One) Time Per Week.  Dispense: 1.5 mL; Refill: 1       Class 3 Severe Obesity (BMI >=40). Obesity-related health conditions include the following: hypertension, diabetes mellitus, dyslipidemias, GERD, and osteoarthritis. Obesity is worsening. BMI is is above average; BMI management plan is completed. We discussed portion control and  "increasing exercise.     --  --  OLDER NOTES:  VISIT 3/21:  ANNUAL PHYSICAL 2/20:  --  DM with A1C 7.8 on less than prescribed meds b/c was running low; d/w wt loss is goal here and call if low spells; to [place] as well to help with diet (micro-alb neg 1/17)...7.3 is good, but goal is 6.8 given age...7.4 \" b/c of my foot and I couldn't exercise\"; will see what Jardiance will cost and try to wean glipizide...6.8 is good drop and is down to 2/1 of glipizide, so increase jardiance now and try 1/1 or 2/0 if drops again...7.7 despite increased Jardaince, so needs 2/1 again and/or get some wt off; agree with new monitor as well...7.2 and rec stay on this dose and diet please (d/w reason for wanting to get off glipizide)...7.4...is up due to being off feet for 6 weeks=8.0, so will try 45 mg actos if no worse swelling...7.1 and d/w yes, she can play with glipizide since having lows...7.3 in 6/20 is fine since only 1/2 dose glipizide now=ditto 9/20 = 7.2---> 7.2 is steady 3/21. (TSH neg 9/20).  OPTHO neg 2/21; MICROALB=  --  HTN remains well controlled. 3/21.  ? QPM6r=60% in 3/21 = no concerns yet.  --  LIPIDS at goal with LDL 69...67---> 88 in 9/20.  SPECT 7/16 with EF 50% and no ischemia (FH CHF=B MI at age 41).  --  FE DEF ANEMIA is up 11.5 to 12.4 and d/w stay on same MVI as of 5/17 OV...13.1 with lowish iron, stay on MVI...stable=defer a while after 2/19 OV---> all labs stable as of 9/20 = stay on MVI.  --  ELEVATED LFTS=wnl as of 1/17... ditto.  GERD w/o dysphagia on PPI and I rec trial of qod; (s/p prior dilation)  --  VIT D DEF=fine 9/20.  BMD neg per Dr Cuellar age 61 and she is scheduled for one soon as of her 10/21 office visit.  --  S/P FALL 10/17 = fell off stool, to [facility], had HCT, balance and paresthesia's since; exam non-focal, neg FTN, neg rhomberg; will get dopplers and review the cat scan, but o/w no tx rec...Dopplers 2/18 were fine; will send to ENT due to sxs with turning head...?  S/P FALL 7/19 = RLE major " swelling, had VELE=neg; saw Ortho and they sent to PT...fell again, had MRI=R tibeal plateau fx and was in brace for 6 weeks per Dr Krishnan; has f/u with him before PT to resume; I d/w try replace aleve with tylenol arthritis.  --  MORBID BBLDMHW=254 is stuck (TSH neg 2/20).  --  --  MMG 9/14/22 per Dr Mac's NP.  COLON 1/22 = polyp x1 = 5 years per Dr. Muir.  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    Follow Up   Return in about 3 months (around 9/6/2023).  Patient was given instructions and counseling regarding her condition or for health maintenance advice. Please see specific information pulled into the AVS if appropriate.     Answers submitted by the patient for this visit:  Other (Submitted on 6/5/2023)  Please describe your symptoms.: Follow-up visit for diabetes and hypertension AND  currently experiencing pain in left hip severe enough to limit walking for exercise and severe enough to disrupt sleep  Have you had these symptoms before?: No  How long have you been having these symptoms?: Greater than 2 weeks  Please list any medications you are currently taking for this condition.: Was taking naproxen and/or ibuprofen for hip pain until blood work came back showing decreased kidney function; now taking Tylenol arthritis, but it doesn’t help much  Please describe any probable cause for these symptoms. : Age and obesity  Primary Reason for Visit (Submitted on 6/5/2023)  What is the primary reason for your visit?: Other

## 2023-07-18 PROBLEM — R21 RASH AND NONSPECIFIC SKIN ERUPTION: Status: ACTIVE | Noted: 2023-07-18

## 2023-07-26 ENCOUNTER — TRANSCRIBE ORDERS (OUTPATIENT)
Dept: ADMINISTRATIVE | Facility: HOSPITAL | Age: 68
End: 2023-07-26
Payer: MEDICARE

## 2023-07-26 DIAGNOSIS — Z12.31 VISIT FOR SCREENING MAMMOGRAM: Primary | ICD-10-CM

## 2023-07-27 ENCOUNTER — OFFICE VISIT (OUTPATIENT)
Dept: OBSTETRICS AND GYNECOLOGY | Facility: CLINIC | Age: 68
End: 2023-07-27
Payer: MEDICARE

## 2023-07-27 ENCOUNTER — OFFICE VISIT (OUTPATIENT)
Dept: ORTHOPEDIC SURGERY | Facility: CLINIC | Age: 68
End: 2023-07-27
Payer: MEDICARE

## 2023-07-27 VITALS
BODY MASS INDEX: 46.63 KG/M2 | DIASTOLIC BLOOD PRESSURE: 79 MMHG | WEIGHT: 247 LBS | HEIGHT: 61 IN | OXYGEN SATURATION: 95 % | HEART RATE: 74 BPM | SYSTOLIC BLOOD PRESSURE: 121 MMHG

## 2023-07-27 VITALS
SYSTOLIC BLOOD PRESSURE: 125 MMHG | WEIGHT: 247.2 LBS | HEART RATE: 68 BPM | HEIGHT: 61 IN | DIASTOLIC BLOOD PRESSURE: 70 MMHG | BODY MASS INDEX: 46.67 KG/M2

## 2023-07-27 DIAGNOSIS — M70.62 TROCHANTERIC BURSITIS OF LEFT HIP: Primary | ICD-10-CM

## 2023-07-27 DIAGNOSIS — N89.8 VAGINAL ITCHING: ICD-10-CM

## 2023-07-27 DIAGNOSIS — M79.89 PALPABLE MASS OF SOFT TISSUE OF SHOULDER: ICD-10-CM

## 2023-07-27 DIAGNOSIS — Z01.411 ENCOUNTER FOR GYNECOLOGICAL EXAMINATION WITH ABNORMAL FINDING: Primary | ICD-10-CM

## 2023-07-27 RX ORDER — EPINEPHRINE 0.3 MG/.3ML
INJECTION SUBCUTANEOUS
COMMUNITY
Start: 2023-07-21

## 2023-07-27 RX ORDER — FLUCONAZOLE 150 MG/1
150 TABLET ORAL
Qty: 2 TABLET | Refills: 0 | Status: SHIPPED | OUTPATIENT
Start: 2023-07-27 | End: 2023-07-31

## 2023-07-27 RX ADMIN — TRIAMCINOLONE ACETONIDE 40 MG: 40 INJECTION, SUSPENSION INTRA-ARTICULAR; INTRAMUSCULAR at 14:16

## 2023-07-27 RX ADMIN — LIDOCAINE HYDROCHLORIDE 5 ML: 10 INJECTION, SOLUTION INFILTRATION; PERINEURAL at 14:16

## 2023-07-27 NOTE — PROGRESS NOTES
"Chief Complaint  Pain and Initial Evaluation of the Left Hip     Subjective      Ashwini Lopez presents to [facility] for evaluation of the left hip. She reports she had left hip pain and her PCP gave her a medrol dose pack for bursitis. She denies injury or trauma. She reports pain sleeping on that side. She reports pain with prolong walking or standing.   She also reports an area to her left shoulder that she would like me to look at as well.     Allergies   Allergen Reactions    Sulfa Antibiotics Hives        Social History     Socioeconomic History    Marital status:    Tobacco Use    Smoking status: Never    Smokeless tobacco: Never   Vaping Use    Vaping Use: Never used   Substance and Sexual Activity    Alcohol use: Not Currently    Drug use: Never    Sexual activity: Yes     Partners: Male     Birth control/protection: Post-menopausal        Review of Systems     Objective   Vital Signs:   /79   Pulse 74   Ht 154.9 cm (60.98\")   Wt 112 kg (247 lb)   SpO2 95%   BMI 46.70 kg/m²       Physical Exam  Constitutional:       Appearance: Normal appearance. The patient is well-developed and normal weight.   HENT:      Head: Normocephalic.      Right Ear: Hearing and external ear normal.      Left Ear: Hearing and external ear normal.      Nose: Nose normal.   Eyes:      Conjunctiva/sclera: Conjunctivae normal.   Cardiovascular:      Rate and Rhythm: Normal rate.   Pulmonary:      Effort: Pulmonary effort is normal.      Breath sounds: No wheezing or rales.   Abdominal:      Palpations: Abdomen is soft.      Tenderness: There is no abdominal tenderness.   Musculoskeletal:      Cervical back: Normal range of motion.   Skin:     Findings: No rash.   Neurological:      Mental Status: The patient is alert and oriented to person, place, and time.   Psychiatric:         Mood and Affect: Mood and affect normal.         Judgment: Judgment normal.       Ortho Exam      Left hip- " tender to the lateral hip over the greater trochanter. Neurovascularly intact. Positive EHL, FHL, GS and TA. Sensation intact to all 5 nerves of the foot. Positive pulses. Flexion 80. External Rotation 30. Internal rotation 20. Negative straight leg raise.     Left shoulder- Sensation to light touch median, radial, ulnar nerve. Positive AIN, PIN, ulnar nerve motor function. No pain with shoulder motion. Palpable mass to the posterior lateral shoulder, 3x2 cm, non-tender. Subcutaneous and mobile    Large Joint Arthrocentesis  Date/Time: 7/27/2023 2:16 PM  Consent given by: patient  Site marked: site marked  Timeout: Immediately prior to procedure a time out was called to verify the correct patient, procedure, equipment, support staff and site/side marked as required   Supporting Documentation  Indications: pain   Procedure Details  Location: hip - Hip joint: left.  Needle size: 22 G  Medications administered: 40 mg triamcinolone acetonide 40 MG/ML; 5 mL lidocaine 1 %  Patient tolerance: patient tolerated the procedure well with no immediate complications        Imaging Results (Most Recent)       None             Result Review :       No results found.           Assessment and Plan     Diagnoses and all orders for this visit:    1. Trochanteric bursitis of left hip (Primary)    2. Palpable mass of soft tissue of shoulder, left        Discussed the treatment plan with the patient.  I reviewed her previous x-rays with her today. Discussed the risks and benefits of a left hip bursa injection. The patient expressed understanding and wished to proceed. She tolerated the injection well. Home exercises given today. Plan for MRI of the left shoulder to evaluate the mass.     Call or return if worsening symptoms.    Follow Up     MRI results      Patient was given instructions and counseling regarding her condition or for health maintenance advice. Please see specific information pulled into the AVS if appropriate.      Scribed for Jeremiah Krishnan MD by Kathleen Marsh.  07/27/23   13:54 EDT    I have personally performed the services described in this document as scribed by the above individual and it is both accurate and complete. Jeremiah Krishnan MD 07/28/23

## 2023-07-27 NOTE — PROGRESS NOTES
"Well Woman Visit    CC: Annual well woman exam       HPI:   68 y.o. Contraception or HRT: Post menopausal, using no HRT  Menses:  none  Pain:  None  Incontinence concerns: No  Hx of abnormal pap:  No  Pt c/o intermittent yeast sx r/t jardiance. C/o some itching and irritation today.       History: PMHx, Meds, Allergies, PSHx, Social Hx, and POBHx all reviewed and updated.      PHYSICAL EXAM:  /70   Pulse 68   Ht 154.9 cm (60.98\")   Wt 112 kg (247 lb 3.2 oz)   BMI 46.74 kg/m²   General- NAD, alert and oriented, appropriate  Psych- Normal mood, good memory  Neck- No masses, no thyroid enlargement  CV- Regular rhythm, no murnurs  Resp- CTA to bases, no wheezes  Abdomen- Soft, non distended, non tender, no masses    Breast left-  Bilaterally symmetrical, no masses, non tender, no nipple discharge  Breast right- Bilaterally symmetrical, no masses, non tender, no nipple discharge    External genitalia- Normal female, no lesions  Urethra/meatus- Normal, no masses, non tender, no prolapse  Bladder- Normal, no masses, non tender, no prolapse  Vagina- No atrophy, no lesions, no discharge, no prolapse, erythema noted  Cvx- Normal, no lesions, no discharge, No cervical motion tenderness, Stenotic cervix  Uterus- Normal size, shape & consistency.  Non tender, mobile, & no prolapse  Adnexa- No mass, non tender, Difficult to palpate  Anus/Rectum/Perineum- Small hemorrhoids, non thrombosed, Hemoccult neg    Lymphatic- No palpable neck, axillary, or groin nodes  Ext- No edema, no cyanosis    Skin- No lesions, no rashes, no acanthosis nigricans        ASSESSMENT and PLAN:  WWE and Problem Visit    Diagnoses and all orders for this visit:    1. Encounter for gynecological examination with abnormal finding (Primary)  -     POC Occult Blood Stool  -     IgP, Aptima HPV    2. Vaginal itching  -     fluconazole (Diflucan) 150 MG tablet; Take 1 tablet by mouth Every 72 (Seventy-Two) Hours for 2 doses.  Dispense: 2 tablet; " Refill: 0    Discussed treating yeast sx with diflucan and pt desires. If sx persist recommend swab. Discussed tight control of blood glucose to help decrease the amount of glucose that she urinates. She will f/u prn.     Domestic violence/abuse screen: negative    Depression screen: no SI    Preventative:   BREAST HEALTH- Monthly self breast exam importance and how to reviewed. MMG and/or MRI (prn) reviewed per society guidelines and her individual history. Mammo/MRI screen: Already up to date.  CERVICAL CANCER Screening- Reviewed current ASCCP guidelines for screening w and wo cotest HPV, age specific.  Screen: Updated today, discussed guidelines and pt desires continuing with pap smears.  COLON CANCER Screening- Reviewed current medical society guidelines and options.  Colonoscopy screen:  Already up to date.  SEXUAL HEALTH: Declines STD screening.  BONE HEALTH- Reviewed current medical society guidelines and options for testing, calcium and vit D intake.  Weight bearing exercise.  DEXA: Already up to date.  VACCINATIONS Recommended: Flu annually, Zoster (51yo and older), Pneumococcal (64yo and older).  Importance discussed, risk being unvaccinated reviewed.  Questions answered  Smoking status- NON SMOKER.  Importance of avoiding second hand smoke.  Follow up PCP/Specialist PMHx and Labs  [facility] : does not qualify      She understands the importance of having any ordered tests to be performed in a timely fashion.  She is encouraged to review her results online and/or contact or office if she has questions.     Follow Up:  Return if symptoms worsen or fail to improve.      Mala Samano, APRN  07/27/2023

## 2023-07-28 RX ORDER — LIDOCAINE HYDROCHLORIDE 10 MG/ML
5 INJECTION, SOLUTION INFILTRATION; PERINEURAL
Status: COMPLETED | OUTPATIENT
Start: 2023-07-27 | End: 2023-07-27

## 2023-07-28 RX ORDER — TRIAMCINOLONE ACETONIDE 40 MG/ML
40 INJECTION, SUSPENSION INTRA-ARTICULAR; INTRAMUSCULAR
Status: COMPLETED | OUTPATIENT
Start: 2023-07-27 | End: 2023-07-27

## 2023-07-31 DIAGNOSIS — M79.89 PALPABLE MASS OF SOFT TISSUE OF SHOULDER: Primary | ICD-10-CM

## 2023-07-31 LAB
CYTOLOGIST CVX/VAG CYTO: NORMAL
CYTOLOGY CVX/VAG DOC CYTO: NORMAL
CYTOLOGY CVX/VAG DOC THIN PREP: NORMAL
DX ICD CODE: NORMAL
HIV 1 & 2 AB SER-IMP: NORMAL
HPV I/H RISK 4 DNA CVX QL PROBE+SIG AMP: NEGATIVE
OTHER STN SPEC: NORMAL
STAT OF ADQ CVX/VAG CYTO-IMP: NORMAL

## 2023-08-30 ENCOUNTER — HOSPITAL ENCOUNTER (OUTPATIENT)
Dept: MRI IMAGING | Facility: HOSPITAL | Age: 68
Discharge: HOME | End: 2023-08-30
Payer: MEDICARE

## 2023-08-30 ENCOUNTER — LAB (OUTPATIENT)
Dept: LAB | Facility: HOSPITAL | Age: 68
End: 2023-08-30
Payer: MEDICARE

## 2023-08-30 DIAGNOSIS — N18.31 STAGE 3A CHRONIC KIDNEY DISEASE: ICD-10-CM

## 2023-08-30 DIAGNOSIS — E11.9 TYPE 2 DIABETES MELLITUS WITHOUT COMPLICATION, WITHOUT LONG-TERM CURRENT USE OF INSULIN: ICD-10-CM

## 2023-08-30 DIAGNOSIS — M79.89 PALPABLE MASS OF SOFT TISSUE OF SHOULDER: ICD-10-CM

## 2023-08-30 LAB
ANION GAP SERPL CALCULATED.3IONS-SCNC: 13 MMOL/L (ref 5–15)
BUN SERPL-MCNC: 15 MG/DL (ref 8–23)
BUN/CREAT SERPL: 14.6 (ref 7–25)
CALCIUM SPEC-SCNC: 9.6 MG/DL (ref 8.6–10.5)
CHLORIDE SERPL-SCNC: 101 MMOL/L (ref 98–107)
CO2 SERPL-SCNC: 23 MMOL/L (ref 22–29)
CREAT SERPL-MCNC: 1.03 MG/DL (ref 0.57–1)
EGFRCR SERPLBLD CKD-EPI 2021: 59.7 ML/MIN/1.73
GLUCOSE SERPL-MCNC: 97 MG/DL (ref 65–99)
HBA1C MFR BLD: 6.3 % (ref 4.8–5.6)
POTASSIUM SERPL-SCNC: 4.4 MMOL/L (ref 3.5–5.2)
SODIUM SERPL-SCNC: 137 MMOL/L (ref 136–145)

## 2023-08-30 PROCEDURE — 83036 HEMOGLOBIN GLYCOSYLATED A1C: CPT

## 2023-08-30 PROCEDURE — 36415 COLL VENOUS BLD VENIPUNCTURE: CPT

## 2023-08-30 PROCEDURE — 73221 MRI JOINT UPR EXTREM W/O DYE: CPT

## 2023-08-30 PROCEDURE — 80048 BASIC METABOLIC PNL TOTAL CA: CPT

## 2023-09-01 RX ORDER — PIOGLITAZONEHYDROCHLORIDE 45 MG/1
TABLET ORAL
Qty: 90 TABLET | Refills: 1 | Status: SHIPPED | OUTPATIENT
Start: 2023-09-01

## 2023-09-01 RX ORDER — LISINOPRIL 10 MG/1
TABLET ORAL
Qty: 90 TABLET | Refills: 1 | Status: SHIPPED | OUTPATIENT
Start: 2023-09-01

## 2023-09-01 RX ORDER — OMEPRAZOLE 20 MG/1
CAPSULE, DELAYED RELEASE ORAL
Qty: 90 CAPSULE | Refills: 1 | Status: SHIPPED | OUTPATIENT
Start: 2023-09-01

## 2023-09-01 RX ORDER — SIMVASTATIN 40 MG
TABLET ORAL
Qty: 90 TABLET | Refills: 1 | Status: SHIPPED | OUTPATIENT
Start: 2023-09-01

## 2023-09-01 RX ORDER — ATENOLOL 100 MG/1
TABLET ORAL
Qty: 90 TABLET | Refills: 1 | Status: SHIPPED | OUTPATIENT
Start: 2023-09-01

## 2023-09-01 RX ORDER — EMPAGLIFLOZIN 25 MG/1
TABLET, FILM COATED ORAL
Qty: 90 TABLET | Refills: 1 | Status: SHIPPED | OUTPATIENT
Start: 2023-09-01

## 2023-09-05 ENCOUNTER — OFFICE VISIT (OUTPATIENT)
Dept: ORTHOPEDIC SURGERY | Facility: CLINIC | Age: 68
End: 2023-09-05
Payer: MEDICARE

## 2023-09-05 VITALS — WEIGHT: 235 LBS | BODY MASS INDEX: 44.37 KG/M2 | HEIGHT: 61 IN | OXYGEN SATURATION: 98 %

## 2023-09-05 DIAGNOSIS — M75.112 INCOMPLETE TEAR OF LEFT ROTATOR CUFF, UNSPECIFIED WHETHER TRAUMATIC: Primary | ICD-10-CM

## 2023-09-05 DIAGNOSIS — M75.22 BICEPS TENDONITIS ON LEFT: ICD-10-CM

## 2023-09-05 DIAGNOSIS — M70.62 TROCHANTERIC BURSITIS OF LEFT HIP: ICD-10-CM

## 2023-09-05 DIAGNOSIS — M79.89 PALPABLE MASS OF SOFT TISSUE OF SHOULDER: ICD-10-CM

## 2023-09-05 NOTE — PROGRESS NOTES
"Chief Complaint  Follow-up and Pain of the Left Shoulder and Follow-up and Pain of the Left Hip     Subjective      Ashwini Lopez presents to [facility] for follow up evaluation of the left shoulder and left hip. The patient has been treating her left hip bursitis conservatively. She reports her last injection gave her pain relief but there is still a \"knot\".   She recently had an MRI of her left shoulder and is here today for those results.     Allergies   Allergen Reactions    Sulfa Antibiotics Hives        Social History     Socioeconomic History    Marital status:    Tobacco Use    Smoking status: Never    Smokeless tobacco: Never   Vaping Use    Vaping Use: Never used   Substance and Sexual Activity    Alcohol use: Not Currently    Drug use: Never    Sexual activity: Yes     Partners: Male     Birth control/protection: Post-menopausal        I reviewed the patient's chief complaint, history of present illness, review of systems, past medical history, surgical history, family history, social history, medications, and allergy list.     Review of Systems     Constitutional: Denies fevers, chills, weight loss  Cardiovascular: Denies chest pain, shortness of breath  Skin: Denies rashes, acute skin changes  Neurologic: Denies headache, loss of consciousness  MSK: Left shoulder and hip pain      Vital Signs:   Ht 154.9 cm (61\")   Wt 107 kg (235 lb)   SpO2 98%   BMI 44.40 kg/m²          Physical Exam  General: Alert. No acute distress    Ortho Exam      Left shoulder- Sensation to light touch median, radial, ulnar nerve. Positive AIN, PIN, ulnar nerve motor function. No pain with shoulder motion. Palpable mass to the posterior lateral shoulder, 3x2 cm, non-tender. Subcutaneous and mobile     Left hip - non-tender to the lateral hip over the greater trochanter. Neurovascularly intact. Positive EHL, FHL, GS and TA. Sensation intact to all 5 nerves of the foot. Positive pulses. " Flexion 80. External Rotation 30. Internal rotation 20. Negative straight leg raise.     Procedures      Imaging Results (Most Recent)       None             Result Review :       MRI Shoulder Left Without Contrast    Result Date: 8/31/2023  Narrative: PROCEDURE: MRI SHOULDER LEFT WO CONTRAST  COMPARISON: None  INDICATIONS: SOFT TISSUE MASS IN LEFT SHOULDER, WITH INTERMITTENT MINOR PAIN AND LIMITED RANGE OF MOTION.      TECHNIQUE: A variety of imaging planes and parameters were utilized for visualization of suspected pathology.  Images were performed without contrast.   FINDINGS:  Rotator cuff:  There is moderate tendinosis distal supraspinatus and infraspinatus tendons.  There is a small articular surface tear of the infraspinatus tendon adjacent to the footprint.  It measures 5 millimeters medial-lateral by 4 millimeters anterior-posterior.  Involves about 50 percent thickness.  There is no muscle atrophy. The subscapularis tendon has mild tendinosis.  No tear seen.  No muscle atrophy.  The teres minor muscle and tendon are normal in appearance.  Glenohumeral joint:  The humeral head is centrally located glenoid.  There is mild cartilage loss.  There is physiologic joint fluid.  There is a tear of the superior labrum from anterior to posterior.  No paralabral cysts are seen.  The superior labral tear involves the biceps anchor but does not involve the biceps tendon.  The inferior joint capsule has normal thickness and signal intensity.  Acromioclavicular joint:  The acromioclavicular joint has a normal appearance with mild degenerative change.  No os acromiale.  No significant lateral downsloping acromion.  No osseous marrow edema.  Long head biceps tendon:  The long head biceps tendon is normally located bicipital groove.  Has normal signal intensity and morphology.  There is increased signal intensity along its intra-articular portion consistent with tendinosis.  Miscellaneous:  There is no significant  subacromial subdeltoid bursal fluid collection.  The bone marrow signal intensity is normal.  No aggressive osseous lesions are contusions or fractures are seen.  The deltoid muscle has normal size and signal intensity.  There are no pathologically enlarged axillary lymph nodes.      Impression:   1. There is moderate tendinosis distal supraspinatus and infraspinatus tendon with a small low-grade partial-thickness articular surface tear infraspinatus tendon adjacent to the foot plate. 2. Mild tendinosis subscapularis tendon. 3. Mild arthritis glenohumeral joint with type 2 superior labral tear. 4. Mild intra-articular tendinosis long head biceps tendon.      CARLA JENNINGS MD       Electronically Signed and Approved By: CARLA JENNINGS MD on 8/31/2023 at 12:34                     Assessment and Plan     Diagnoses and all orders for this visit:    1. Incomplete tear of left rotator cuff, unspecified whether traumatic (Primary)    2. Palpable mass of soft tissue of shoulder    3. Biceps tendonitis on left    4. Trochanteric bursitis of left hip        Discussed the treatment plan with the patient.  I reviewed the MRI with the patient. Plan for conservative treatment at this time. Plan for OTC medication and home exercises.     Call or return if worsening symptoms.    Follow Up     PRN      Patient was given instructions and counseling regarding her condition or for health maintenance advice. Please see specific information pulled into the AVS if appropriate.     Scribed for Jeremiah Krishnan MD by Kathleen Marsh.  09/05/23   10:07 EDT    I have personally performed the services described in this document as scribed by the above individual and it is both accurate and complete. Jeremiah Krishnan MD 09/05/23

## 2023-09-06 ENCOUNTER — OFFICE VISIT (OUTPATIENT)
Dept: INTERNAL MEDICINE | Facility: CLINIC | Age: 68
End: 2023-09-06
Payer: MEDICARE

## 2023-09-06 VITALS
HEIGHT: 61 IN | OXYGEN SATURATION: 95 % | HEART RATE: 77 BPM | TEMPERATURE: 96.9 F | SYSTOLIC BLOOD PRESSURE: 110 MMHG | DIASTOLIC BLOOD PRESSURE: 70 MMHG | WEIGHT: 230.4 LBS | BODY MASS INDEX: 43.5 KG/M2

## 2023-09-06 DIAGNOSIS — I10 ESSENTIAL HYPERTENSION: ICD-10-CM

## 2023-09-06 DIAGNOSIS — E78.2 MIXED HYPERLIPIDEMIA: ICD-10-CM

## 2023-09-06 DIAGNOSIS — E66.01 MORBID OBESITY: ICD-10-CM

## 2023-09-06 DIAGNOSIS — N18.31 STAGE 3A CHRONIC KIDNEY DISEASE: ICD-10-CM

## 2023-09-06 DIAGNOSIS — E11.9 TYPE 2 DIABETES MELLITUS WITHOUT COMPLICATION, WITHOUT LONG-TERM CURRENT USE OF INSULIN: Primary | ICD-10-CM

## 2023-09-06 DIAGNOSIS — Z00.00 WELL ADULT EXAM: ICD-10-CM

## 2023-09-06 NOTE — ASSESSMENT & PLAN NOTE
Blood pressure with excellent control as of her 9/23 office visit.  She is not getting any hypotensive episodes just yet, she is aware that we would want to back off on atenolol some if she does.  Otherwise stable to continue with full dose atenolol and low-dose lisinopril.

## 2023-09-06 NOTE — ASSESSMENT & PLAN NOTE
GFR is rebounded to basically 60 as of 9/23.  Electrolytes are fine, no acidosis, will continue to monitor this with just routine labs.  Patient is on low-dose ACE inhibitor and full dose Jardiance.

## 2023-09-06 NOTE — ASSESSMENT & PLAN NOTE
Patient with continued excellent control of her sugars, A1c is down from 7.2 to 6.3 as of her 9/23 office visit.  She has since weaned off of glipizide totally, and has not noticed any bump in her sugars.  She is doing very well on the 1 mg dose of Ozempic, I think we can continue with that for now.  We will work towards weaning full dose Actos out, dropping to half a pill presently.  Otherwise continue full doses of metformin and Jardiance.

## 2023-09-06 NOTE — PROGRESS NOTES
"Chief Complaint  Diabetes (Wants to know if it is time to cut back on Actos) and Follow-up (Pt states that this is routine, she had labs, she has no new issues. )    Subjective          Ashwini Lopez presents to [facility]     History of present illness:  Patient is a 68-year-old female with underlying diabetes mellitus on oral agents, hypertension, hyperlipidemia, among others, who is here 9/23 for routine 4-month diabetic follow-up.  We will review her med list, go over her labs in detail, and address any new concerns she may have.    Review of Systems   Constitutional:  Negative for appetite change, fatigue, fever and unexpected weight loss.   HENT:  Negative for congestion, ear pain, rhinorrhea and sore throat.    Eyes:  Negative for blurred vision and pain.   Respiratory:  Negative for cough, chest tightness, shortness of breath and wheezing.    Cardiovascular:  Negative for chest pain, palpitations and leg swelling.   Gastrointestinal:  Negative for abdominal pain, diarrhea and vomiting.   Endocrine: Negative for polydipsia, polyphagia and polyuria.   Genitourinary:  Negative for difficulty urinating, dysuria and hematuria.   Musculoskeletal:  Negative for arthralgias and gait problem.   Skin:  Positive for rash. Negative for pallor and skin lesions.   Neurological:  Negative for dizziness, tremors, seizures, syncope, speech difficulty, weakness, memory problem and confusion.   Psychiatric/Behavioral:  Negative for self-injury and depressed mood. The patient is not nervous/anxious.      Objective   Vital Signs:   /70   Pulse 77   Temp 96.9 °F (36.1 °C) (Skin)   Ht 154.9 cm (60.98\")   Wt 105 kg (230 lb 6.4 oz)   SpO2 95%   BMI 43.56 kg/m²           Physical Exam  Vitals and nursing note reviewed.   Constitutional:       General: She is not in acute distress.     Appearance: Normal appearance. She is not toxic-appearing.   HENT:      Head: Atraumatic.      Right " Ear: External ear normal.      Left Ear: External ear normal.      Nose: Nose normal.      Mouth/Throat:      Mouth: Mucous membranes are moist.   Eyes:      General:         Right eye: No discharge.         Left eye: No discharge.      Extraocular Movements: Extraocular movements intact.      Pupils: Pupils are equal, round, and reactive to light.   Cardiovascular:      Rate and Rhythm: Normal rate and regular rhythm.      Pulses: Normal pulses.      Heart sounds: Normal heart sounds. No murmur heard.    No gallop.   Pulmonary:      Effort: Pulmonary effort is normal. No respiratory distress.      Breath sounds: No wheezing, rhonchi or rales.   Abdominal:      General: There is no distension.      Palpations: Abdomen is soft. There is no mass.      Tenderness: There is no abdominal tenderness. There is no guarding.   Musculoskeletal:         General: No swelling or tenderness.      Cervical back: No tenderness.      Right lower leg: No edema.      Left lower leg: No edema.   Skin:     General: Skin is warm and dry.      Findings: No rash.   Neurological:      General: No focal deficit present.      Mental Status: She is alert and oriented to person, place, and time. Mental status is at baseline.      Motor: No weakness.      Gait: Gait normal.   Psychiatric:         Mood and Affect: Mood normal.         Thought Content: Thought content normal.        Result Review :   The following data was reviewed by: Savage Watts MD on 07/13/2021:                  Assessment and Plan    Diagnoses and all orders for this visit:    1. Type 2 diabetes mellitus without complication, without long-term current use of insulin (Primary)  Assessment & Plan:  Patient with continued excellent control of her sugars, A1c is down from 7.2 to 6.3 as of her 9/23 office visit.  She has since weaned off of glipizide totally, and has not noticed any bump in her sugars.  She is doing very well on the 1 mg dose of Ozempic, I think we can continue  with that for now.  We will work towards weaning full dose Actos out, dropping to half a pill presently.  Otherwise continue full doses of metformin and Jardiance.    Orders:  -     Microalbumin / Creatinine Urine Ratio - Urine, Clean Catch; Future  -     Hemoglobin A1c; Future    2. Stage 3a chronic kidney disease  Assessment & Plan:  GFR is rebounded to basically 60 as of 9/23.  Electrolytes are fine, no acidosis, will continue to monitor this with just routine labs.  Patient is on low-dose ACE inhibitor and full dose Jardiance.    Orders:  -     Comprehensive Metabolic Panel; Future    3. Mixed hyperlipidemia  -     Lipid Panel; Future    4. Essential hypertension  Assessment & Plan:  Blood pressure with excellent control as of her 9/23 office visit.  She is not getting any hypotensive episodes just yet, she is aware that we would want to back off on atenolol some if she does.  Otherwise stable to continue with full dose atenolol and low-dose lisinopril.      5. Well adult exam  -     TSH; Future  -     T4, free; Future    6. Morbid obesity  Overview:  BMI is  down from 48 in 3/22 to 46 as of 9/22---> BMI down further to 43.5 as of 9/23.  Weight is down from 250 to 230 this year.  Patient is continuing with conservative dietary restrictions and activity as tolerated.  Additionally she is tolerating the 1 mg dose of Ozempic which is benefiting her diabetes as well.  Continue current plan of care.        Other orders  -     Semaglutide, 1 MG/DOSE, (OZEMPIC) 4 MG/3ML solution pen-injector; Inject 1 mg under the skin into the appropriate area as directed 1 (One) Time Per Week.  Dispense: 9 mL; Refill: 1       Class 3 Severe Obesity (BMI >=40). Obesity-related health conditions include the following: hypertension, diabetes mellitus, dyslipidemias, GERD, and osteoarthritis. Obesity is worsening. BMI is is above average; BMI management plan is completed. We discussed portion control and increasing exercise.    "  --  --  OLDER NOTES:  VISIT 3/21:  ANNUAL PHYSICAL 2/20:  --  DM with A1C 7.8 on less than prescribed meds b/c was running low; d/w wt loss is goal here and call if low spells; to [place] as well to help with diet (micro-alb neg 1/17)...7.3 is good, but goal is 6.8 given age...7.4 \" b/c of my foot and I couldn't exercise\"; will see what Jardiance will cost and try to wean glipizide...6.8 is good drop and is down to 2/1 of glipizide, so increase jardiance now and try 1/1 or 2/0 if drops again...7.7 despite increased Jardaince, so needs 2/1 again and/or get some wt off; agree with new monitor as well...7.2 and rec stay on this dose and diet please (d/w reason for wanting to get off glipizide)...7.4...is up due to being off feet for 6 weeks=8.0, so will try 45 mg actos if no worse swelling...7.1 and d/w yes, she can play with glipizide since having lows...7.3 in 6/20 is fine since only 1/2 dose glipizide now=ditto 9/20 = 7.2---> 7.2 is steady 3/21. (TSH neg 9/20).  OPTHO neg 2/21; MICROALB=  --  HTN remains well controlled. 3/21.  ? PZJ4j=29% in 3/21 = no concerns yet.  --  LIPIDS at goal with LDL 69...67---> 88 in 9/20.  SPECT 7/16 with EF 50% and no ischemia (FH CHF=B MI at age 41).  --  FE DEF ANEMIA is up 11.5 to 12.4 and d/w stay on same MVI as of 5/17 OV...13.1 with lowish iron, stay on MVI...stable=defer a while after 2/19 OV---> all labs stable as of 9/20 = stay on MVI.  --  ELEVATED LFTS=wnl as of 1/17... ditto.  GERD w/o dysphagia on PPI and I rec trial of qod; (s/p prior dilation)  --  VIT D DEF=fine 9/20.  BMD neg per Dr Cuellar age 61 and she is scheduled for one soon as of her 10/21 office visit.  --  S/P FALL 10/17 = fell off stool, to [facility], had HCT, balance and paresthesia's since; exam non-focal, neg FTN, neg rhomberg; will get dopplers and review the cat scan, but o/w no tx rec...Dopplers 2/18 were fine; will send to ENT due to sxs with turning head...?  S/P FALL 7/19 = RLE major swelling, had VELE=neg; " saw Ortho and they sent to PT...fell again, had MRI=R tibeal plateau fx and was in brace for 6 weeks per Dr Krishnan; has f/u with him before PT to resume; I d/w try replace aleve with tylenol arthritis.  --  MORBID [id number] is stuck (TSH neg 2/20).  --  --  MMG 9/14/22 per Dr Mac's NP.  COLON 1/22 = polyp x1 = 5 years per Dr. Muir.  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    Follow Up   Return in about 3 months (around 12/6/2023).  Patient was given instructions and counseling regarding her condition or for health maintenance advice. Please see specific information pulled into the AVS if appropriate.       Answers submitted by the patient for this visit:  Primary Reason for Visit (Submitted on 8/30/2023)  What is the primary reason for your visit?: Diabetes  Diabetes Questionnaire (Submitted on 8/30/2023)  Chief Complaint: Diabetes problem  Diabetes type: type 2  MedicAlert ID: No  Disease duration: 22 Years  foot paresthesias: No  foot ulcerations: No  visual change: No  Symptom course: improving  hunger: No  mood changes: No  sweats: No  blackouts: No  hospitalization: No  required assistance: No  required glucagon: No  CVA: No  heart disease: No  impotence: No  nephropathy: No  peripheral neuropathy: No  PVD: No  retinopathy: No  CAD risks: dyslipidemia, family history, hypertension, obesity, post-menopausal, sedentary lifestyle, stress  Current treatments: oral agent (triple therapy)  Treatment compliance: all of the time  Home blood tests: 3-4 x per day  Monitoring compliance: good  Blood glucose trend: decreasing steadily  breakfast time: 7-8 am  breakfast glucose level:   High score: 140-180  Overall: 130-140  Weight trend: decreasing steadily  Current diet: generally healthy  Meal planning: avoidance of concentrated sweets, calorie counting, carbohydrate counting  Exercise:  intermittently  Dietitian visit: No  Eye exam current: Yes  Sees podiatrist: Yes

## 2023-09-19 ENCOUNTER — OFFICE VISIT (OUTPATIENT)
Dept: PODIATRY | Facility: CLINIC | Age: 68
End: 2023-09-19
Payer: MEDICARE

## 2023-09-19 VITALS
SYSTOLIC BLOOD PRESSURE: 107 MMHG | DIASTOLIC BLOOD PRESSURE: 58 MMHG | TEMPERATURE: 96.9 F | HEIGHT: 61 IN | WEIGHT: 231 LBS | HEART RATE: 72 BPM | BODY MASS INDEX: 43.61 KG/M2 | OXYGEN SATURATION: 100 %

## 2023-09-19 DIAGNOSIS — M79.671 FOOT PAIN, BILATERAL: ICD-10-CM

## 2023-09-19 DIAGNOSIS — L60.0 ONYCHOCRYPTOSIS: ICD-10-CM

## 2023-09-19 DIAGNOSIS — M79.672 FOOT PAIN, BILATERAL: ICD-10-CM

## 2023-09-19 DIAGNOSIS — E11.8 DIABETIC FOOT: ICD-10-CM

## 2023-09-19 DIAGNOSIS — E11.9 NON-INSULIN DEPENDENT TYPE 2 DIABETES MELLITUS: Primary | ICD-10-CM

## 2023-09-19 DIAGNOSIS — B35.1 ONYCHOMYCOSIS: ICD-10-CM

## 2023-09-19 NOTE — PROGRESS NOTES
[facility] - PODIATRY    Today's Date: 09/19/23    Patient Name: Ashwini Lopez  MRN: [id number]  CSN: [id number]  PCP: Savage Watts MD, Last PCP Visit:  9/6/2023  Referring Provider: No ref. provider found    SUBJECTIVE     Chief Complaint   Patient presents with    Right Foot - Follow-up, Nail Problem, Callouses     In between 4th and 5th toe callus     Left Foot - Follow-up, Nail Problem     HPI: Ashwini Lopez, a 68 y.o.female, presents to clinic for painful toenail and a diabetic foot evaluation.    New, Established, New Problem:  est  Location:  Toenails  Duration:   Greater than five years  Onset:  Gradual  Nature:  sore with palpation.  Stable, worsening, improving:   improving  Aggravating factors:  Pain with shoe gear and ambulation.  Previous Treatment: debridement    Patient controlling diabetes via:  oral meds    Patient states their last blood glucose was:  106    Patient denies any fevers, chills, nausea, vomiting, shortness of breath, nor any other constitutional signs nor symptoms.    Medical changes:  no longer taking glipizide .    Past Medical History:   Diagnosis Date    Chronic fatigue, unspecified     Diverticulitis     Essential (primary) hypertension     GERD (gastroesophageal reflux disease)     High cholesterol     Iron deficiency anemia, unspecified     Mixed hyperlipidemia     Morbid (severe) obesity due to excess calories     Seasonal allergies     Type 2 diabetes mellitus without complication     Vitamin D deficiency, unspecified      Past Surgical History:   Procedure Laterality Date    BILATERAL BREAST REDUCTION      COLONOSCOPY  2011    [facility]     COLONOSCOPY N/A 01/21/2022    Procedure: COLONOSCOPY WITH POLYPECTOMY;  Surgeon: Savanna Muir MD;  Location: [facility] ENDOSCOPY;  Service: Gastroenterology;  Laterality: N/A;  COLON POLYP, DIVERTICULOSIS, HEMORRHOIDS    ENDOSCOPY N/A 01/21/2022    Procedure: ESOPHAGOGASTRODUODENOSCOPY WITH BIOPSIES;   Surgeon: Savanna Muir MD;  Location: [facility] ENDOSCOPY;  Service: Gastroenterology;  Laterality: N/A;  GASTRIC POLYPS, HIATAL HERNIA    ESOPHAGEAL DILATATION      TONSILLECTOMY      UPPER GASTROINTESTINAL ENDOSCOPY  2011     Family History   Problem Relation Age of Onset    Heart disease Father     Cancer Father     Diabetes Father     Breast cancer Mother 36    Hypertension Mother     Cancer Mother     Heart attack Brother     Breast cancer Maternal Aunt     Malig Hyperthermia Neg Hx     Ovarian cancer Neg Hx     Uterine cancer Neg Hx     Colon cancer Neg Hx     Melanoma Neg Hx     Prostate cancer Neg Hx     Deep vein thrombosis Neg Hx     Pulmonary embolism Neg Hx     Coronary artery disease Neg Hx      Social History     Socioeconomic History    Marital status:    Tobacco Use    Smoking status: Never    Smokeless tobacco: Never   Vaping Use    Vaping Use: Never used   Substance and Sexual Activity    Alcohol use: Not Currently    Drug use: Never    Sexual activity: Yes     Partners: Male     Birth control/protection: Post-menopausal     Allergies   Allergen Reactions    Sulfa Antibiotics Hives     Current Outpatient Medications   Medication Sig Dispense Refill    acetaminophen (TYLENOL) 650 MG 8 hr tablet 1 tablet Every 8 (Eight) Hours As Needed.      aspirin 81 MG EC tablet Take 1 tablet by mouth Daily.      atenolol (TENORMIN) 100 MG tablet TAKE 1 TABLET DAILY 90 tablet 1    calcium carb-cholecalciferol 600-800 MG-UNIT tablet 1 tablet.      Cetirizine HCl (ZyrTEC Allergy) 10 MG capsule Take 1 tablet by mouth Daily.      EPINEPHrine (EPIPEN) 0.3 MG/0.3ML solution auto-injector injection INJECT 0.3 ML INTO THE APPROPRIATE MUSCLE AS DIRECTED BY PRESCRIBER 1 (ONE) TIME FOR 1 DOSE.      Jardiance 25 MG tablet tablet TAKE 1 TABLET DAILY 90 tablet 1    lisinopril (PRINIVIL,ZESTRIL) 10 MG tablet TAKE 1 TABLET DAILY 90 tablet 1    metFORMIN (GLUCOPHAGE) 1000 MG tablet TAKE 1 TABLET TWICE A   tablet 1    Multiple Vitamins-Iron (multivitamin with iron) tablet tablet 1 tablet.      Multiple Vitamins-Minerals (PRESERVISION AREDS 2 PO) 1 tablet.      omeprazole (priLOSEC) 20 MG capsule TAKE 1 CAPSULE DAILY 90 capsule 1    pioglitazone (ACTOS) 45 MG tablet TAKE 1 TABLET DAILY 90 tablet 1    Semaglutide, 1 MG/DOSE, (OZEMPIC) 4 MG/3ML solution pen-injector Inject 1 mg under the skin into the appropriate area as directed 1 (One) Time Per Week. 9 mL 1    simvastatin (ZOCOR) 40 MG tablet TAKE 1 TABLET DAILY 90 tablet 1     No current facility-administered medications for this visit.     Review of Systems   Constitutional: Negative.    Skin:         Painful toenails.   All other systems reviewed and are negative.    OBJECTIVE     Vitals:    09/19/23 0733   BP: 107/58   Pulse: 72   Temp: 96.9 °F (36.1 °C)   SpO2: 100%       Body mass index is 43.65 kg/m².    Lab Results   Component Value Date    HGBA1C 6.30 (H) 08/30/2023       Lab Results   Component Value Date    GLUCOSE 97 08/30/2023    CALCIUM 9.6 08/30/2023     08/30/2023    K 4.4 08/30/2023    CO2 23.0 08/30/2023     08/30/2023    BUN 15 08/30/2023    CREATININE 1.03 (H) 08/30/2023    EGFRIFNONA 61 02/28/2022    BCR 14.6 08/30/2023    ANIONGAP 13.0 08/30/2023       Patient seen in no apparent distress.      PHYSICAL EXAM:     Foot/Ankle Exam    GENERAL  Diabetic foot exam performed    Appearance:  obese  Orientation:  AAOx3  Affect:  appropriate  Gait:  unimpaired  Assistance:  independent  Right shoe gear: casual shoe  Left shoe gear: casual shoe    VASCULAR     Right Foot Vascularity   Dorsalis pedis:  2+  Posterior tibial:  2+  Skin temperature:  cool  Edema grading:  None  CFT:  < 3 seconds  Pedal hair growth:  Absent  Varicosities:  moderate varicosities     Left Foot Vascularity   Dorsalis pedis:  2+  Posterior tibial:  2+  Skin temperature:  cool  Edema grading:  None  CFT:  < 3 seconds  Pedal hair growth:  Absent  Varicosities:  moderate  varicosities     NEUROLOGIC     Right Foot Neurologic   Normal sensation    Light touch sensation: normal  Vibratory sensation: normal  Hot/Cold sensation: normal  Protective Sensation using Appling-Sunil Monofilament:   Sites intact: 10  Sites tested: 10     Left Foot Neurologic   Normal sensation    Light touch sensation: normal  Vibratory sensation: normal  Hot/Cold sensation:  normal  Protective Sensation using Appling-Sunil Monofilament:   Sites intact: 10  Sites tested: 10    MUSCLE STRENGTH     Right Foot Muscle Strength   Foot dorsiflexion:  4  Foot plantar flexion:  4  Foot inversion:  4  Foot eversion:  4     Left Foot Muscle Strength   Foot dorsiflexion:  4  Foot plantar flexion:  4  Foot inversion:  4  Foot eversion:  4    RANGE OF MOTION     Right Foot Range of Motion   Foot and ankle ROM within normal limits       Left Foot Range of Motion   Foot and ankle ROM within normal limits      DERMATOLOGIC      Right Foot Dermatologic   Skin  Right foot skin is intact.   Nails  1.  Positive for elongated, onychomycosis, abnormal thickness, subungual debris and ingrown toenail.  Nails comment:  Toenails 1, 2, 3, 4, and 5     Left Foot Dermatologic   Skin  Left foot skin is intact.   Nails comment:  Toenails 1, 2, 3, 4, and 5  Nails  1.  Positive for elongated, onychomycosis, abnormal thickness, subungual debris and ingrown toenail.    Diabetic Foot Exam Performed and Monofilament Test Performed    ASSESSMENT/PLAN     Diagnoses and all orders for this visit:    1. Non-insulin dependent type 2 diabetes mellitus (Primary)    2. Diabetic foot    3. Foot pain, bilateral    4. Onychomycosis    5. Onychocryptosis        Comprehensive lower extremity examination and evaluation was performed.    Discussed findings and treatment plan including risks, benefits, and treatment options with patient in detail. Patient agreed with treatment plan.    Medications and allergies reviewed.  Reviewed available blood glucose  and HgB A1C lab values along with other pertinent labs.  These were discussed with the patient as to their importance of diabetic maintenance.    Toenails 1 on Right and 1 on Left were debrided with nail nippers then filed with a Denilsonmel nail iliana.  Patient tolerated procedure well without complications.    Diabetic foot exam performed and documented this date, compliant with CQM required standards. Detail of findings as noted in physical exam.  Lower extremity Neurologic exam for diabetic patient performed and documented this date, compliant with PQRS required standards. Detail of findings as noted in physical exam.  Advised patient importance of good routine lower extremity hygiene. Advised patient importance of evaluating for intact skin and pain free nail borders.  Advised patient to use mirror to evaluate plantar/ soles of feet for better visualization. Advised patient monitor and phone office to be seen if any cracking to skin, open lesions, painful nail borders or if nails become elongated prior to next visit. Advised patient importance of daily cleansing of lower extremities, followed by good skin cream to maintain normal hydration of skin. Also advised patient importance of close daily monitoring of blood sugar. Advised to regulate diet and medications to maintain control of blood sugar in optimal range. Contact primary care provider if difficulties maintaining blood sugar levels.  Advised Patient of presence of Diabetes Mellitus condition.  Advised Patient risk of progression and worsening or improvement, then return of condition.  Will monitor condition for any change in future. Treat with most appropriate treatment pending status of condition.  Counseled and advised patient extensively on nature and ramifications of diabetes. Standard instructions given to patient for good diabetic foot care and maintenance. Advised importance of careful monitoring to avoid break down and complications secondary to  diabetes. Advised patient importance of strict maintenance of blood sugar control. Advised patient of possible ominous results from neglect of condition, i.e.: amputation/ loss of digits, feet and legs, or even death.  Patient states understands counseling, will monitor closely, continue good hygiene and routine diabetic foot care. Patient will contact office should they have any questions or problems.      An After Visit Summary was printed and given to the patient at discharge, including (if requested) any available informative/educational handouts regarding diagnosis, treatment, or medications. All questions were answered to patient/family satisfaction. Should symptoms fail to improve or worsen they agree to call or return to clinic or to go to the Emergency Department. Discussed the importance of following up with any needed screening tests/labs/specialist appointments and any requested follow-up recommended by me today. Importance of maintaining follow-up discussed and patient accepts that missed appointments can delay diagnosis and potentially lead to worsening of conditions.    Return in about 9 weeks (around 11/21/2023) for Toenail Care., or sooner if acute issues arise.    This document has been electronically signed by Jim Nj DPM on September 19, 2023 08:04 EDT

## 2023-11-30 ENCOUNTER — LAB (OUTPATIENT)
Dept: LAB | Facility: HOSPITAL | Age: 68
End: 2023-11-30
Payer: MEDICARE

## 2023-11-30 DIAGNOSIS — E78.2 MIXED HYPERLIPIDEMIA: ICD-10-CM

## 2023-11-30 DIAGNOSIS — N18.31 STAGE 3A CHRONIC KIDNEY DISEASE: ICD-10-CM

## 2023-11-30 DIAGNOSIS — Z00.00 WELL ADULT EXAM: ICD-10-CM

## 2023-11-30 DIAGNOSIS — E11.9 TYPE 2 DIABETES MELLITUS WITHOUT COMPLICATION, WITHOUT LONG-TERM CURRENT USE OF INSULIN: ICD-10-CM

## 2023-11-30 LAB
ALBUMIN SERPL-MCNC: 4.8 G/DL (ref 3.5–5.2)
ALBUMIN UR-MCNC: 2.7 MG/DL
ALBUMIN/GLOB SERPL: 1.8 G/DL
ALP SERPL-CCNC: 70 U/L (ref 39–117)
ALT SERPL W P-5'-P-CCNC: 20 U/L (ref 1–33)
ANION GAP SERPL CALCULATED.3IONS-SCNC: 13 MMOL/L (ref 5–15)
AST SERPL-CCNC: 25 U/L (ref 1–32)
BILIRUB SERPL-MCNC: 0.4 MG/DL (ref 0–1.2)
BUN SERPL-MCNC: 12 MG/DL (ref 8–23)
BUN/CREAT SERPL: 12.5 (ref 7–25)
CALCIUM SPEC-SCNC: 10.2 MG/DL (ref 8.6–10.5)
CHLORIDE SERPL-SCNC: 101 MMOL/L (ref 98–107)
CHOLEST SERPL-MCNC: 136 MG/DL (ref 0–200)
CO2 SERPL-SCNC: 25 MMOL/L (ref 22–29)
CREAT SERPL-MCNC: 0.96 MG/DL (ref 0.57–1)
CREAT UR-MCNC: 55.1 MG/DL
EGFRCR SERPLBLD CKD-EPI 2021: 65 ML/MIN/1.73
GLOBULIN UR ELPH-MCNC: 2.7 GM/DL
GLUCOSE SERPL-MCNC: 151 MG/DL (ref 65–99)
HBA1C MFR BLD: 6.6 % (ref 4.8–5.6)
HDLC SERPL-MCNC: 48 MG/DL (ref 40–60)
LDLC SERPL CALC-MCNC: 58 MG/DL (ref 0–100)
LDLC/HDLC SERPL: 1.08 {RATIO}
MICROALBUMIN/CREAT UR: 49 MG/G (ref 0–29)
POTASSIUM SERPL-SCNC: 4.6 MMOL/L (ref 3.5–5.2)
PROT SERPL-MCNC: 7.5 G/DL (ref 6–8.5)
SODIUM SERPL-SCNC: 139 MMOL/L (ref 136–145)
T4 FREE SERPL-MCNC: 1.4 NG/DL (ref 0.93–1.7)
TRIGL SERPL-MCNC: 182 MG/DL (ref 0–150)
TSH SERPL DL<=0.05 MIU/L-ACNC: 1.29 UIU/ML (ref 0.27–4.2)
VLDLC SERPL-MCNC: 30 MG/DL (ref 5–40)

## 2023-11-30 PROCEDURE — 82043 UR ALBUMIN QUANTITATIVE: CPT

## 2023-11-30 PROCEDURE — 80053 COMPREHEN METABOLIC PANEL: CPT

## 2023-11-30 PROCEDURE — 84439 ASSAY OF FREE THYROXINE: CPT

## 2023-11-30 PROCEDURE — 84443 ASSAY THYROID STIM HORMONE: CPT

## 2023-11-30 PROCEDURE — 82570 ASSAY OF URINE CREATININE: CPT

## 2023-11-30 PROCEDURE — 80061 LIPID PANEL: CPT

## 2023-11-30 PROCEDURE — 83036 HEMOGLOBIN GLYCOSYLATED A1C: CPT

## 2023-11-30 PROCEDURE — 36415 COLL VENOUS BLD VENIPUNCTURE: CPT

## 2023-12-06 ENCOUNTER — OFFICE VISIT (OUTPATIENT)
Dept: INTERNAL MEDICINE | Facility: CLINIC | Age: 68
End: 2023-12-06
Payer: MEDICARE

## 2023-12-06 VITALS
HEIGHT: 61 IN | BODY MASS INDEX: 40.25 KG/M2 | OXYGEN SATURATION: 98 % | WEIGHT: 213.2 LBS | DIASTOLIC BLOOD PRESSURE: 72 MMHG | TEMPERATURE: 97.8 F | HEART RATE: 71 BPM | SYSTOLIC BLOOD PRESSURE: 110 MMHG

## 2023-12-06 DIAGNOSIS — E11.9 TYPE 2 DIABETES MELLITUS WITHOUT COMPLICATION, WITHOUT LONG-TERM CURRENT USE OF INSULIN: Primary | ICD-10-CM

## 2023-12-06 DIAGNOSIS — N18.31 STAGE 3A CHRONIC KIDNEY DISEASE: ICD-10-CM

## 2023-12-06 DIAGNOSIS — E55.9 VITAMIN D DEFICIENCY: ICD-10-CM

## 2023-12-06 DIAGNOSIS — I10 ESSENTIAL HYPERTENSION: ICD-10-CM

## 2023-12-06 DIAGNOSIS — E66.01 MORBID OBESITY: ICD-10-CM

## 2023-12-06 DIAGNOSIS — Z78.0 POSTMENOPAUSE: ICD-10-CM

## 2023-12-06 DIAGNOSIS — E78.2 MIXED HYPERLIPIDEMIA: ICD-10-CM

## 2023-12-06 DIAGNOSIS — Z00.00 WELL ADULT EXAM: ICD-10-CM

## 2023-12-06 NOTE — ASSESSMENT & PLAN NOTE
Blood pressure with excellent control still in her pulse is around 70 as of 12/23.  So patient is stable to continue with full dose atenolol and just low-dose lisinopril.

## 2023-12-06 NOTE — PROGRESS NOTES
"Chief Complaint  Diabetes and Follow-up (Pt states that this is routine, she had labs and she has no new issues.)    Subjective          Ashwini Lopez presents to [facility] INTERNAL MEDICINE     History of Present Illness:  Patient is a 68-year-old female with underlying diabetes mellitus on oral agents, hypertension, hyperlipidemia, among others, who is here12/23 for routine 4-month diabetic follow-up.  We will review her med list, go over her labs in detail, and address any new concerns she may have.    Review of Systems   Constitutional:  Negative for appetite change, fatigue, fever and unexpected weight loss.   HENT:  Negative for congestion, ear pain, rhinorrhea and sore throat.    Eyes:  Negative for blurred vision and pain.   Respiratory:  Negative for cough, chest tightness, shortness of breath and wheezing.    Cardiovascular:  Negative for chest pain, palpitations and leg swelling.   Gastrointestinal:  Negative for abdominal pain, diarrhea and vomiting.   Endocrine: Negative for polydipsia, polyphagia and polyuria.   Genitourinary:  Negative for difficulty urinating, dysuria and hematuria.   Musculoskeletal:  Negative for arthralgias and gait problem.   Skin:  Positive for rash. Negative for pallor and skin lesions.   Neurological:  Negative for dizziness, tremors, seizures, syncope, speech difficulty, weakness, memory problem and confusion.   Psychiatric/Behavioral:  Negative for self-injury and depressed mood. The patient is not nervous/anxious.        Objective   Vital Signs:   /72   Pulse 71   Temp 97.8 °F (36.6 °C) (Skin)   Ht 154.9 cm (60.98\")   Wt 96.7 kg (213 lb 3.2 oz)   SpO2 98%   BMI 40.30 kg/m²           Physical Exam  Vitals and nursing note reviewed.   Constitutional:       General: She is not in acute distress.     Appearance: Normal appearance. She is not toxic-appearing.   HENT:      Head: Atraumatic.      Right Ear: External ear normal.      Left Ear: " External ear normal.      Nose: Nose normal.      Mouth/Throat:      Mouth: Mucous membranes are moist.   Eyes:      General:         Right eye: No discharge.         Left eye: No discharge.      Extraocular Movements: Extraocular movements intact.      Pupils: Pupils are equal, round, and reactive to light.   Cardiovascular:      Rate and Rhythm: Normal rate and regular rhythm.      Pulses: Normal pulses.      Heart sounds: Normal heart sounds. No murmur heard.     No gallop.   Pulmonary:      Effort: Pulmonary effort is normal. No respiratory distress.      Breath sounds: No wheezing, rhonchi or rales.   Abdominal:      General: There is no distension.      Palpations: Abdomen is soft. There is no mass.      Tenderness: There is no abdominal tenderness. There is no guarding.   Musculoskeletal:         General: No swelling or tenderness.      Cervical back: No tenderness.      Right lower leg: No edema.      Left lower leg: No edema.   Skin:     General: Skin is warm and dry.      Findings: No rash.   Neurological:      General: No focal deficit present.      Mental Status: She is alert and oriented to person, place, and time. Mental status is at baseline.      Motor: No weakness.      Gait: Gait normal.   Psychiatric:         Mood and Affect: Mood normal.         Thought Content: Thought content normal.          Result Review :   The following data was reviewed by: Savage Watts MD on 07/13/2021:                  Assessment and Plan    Diagnoses and all orders for this visit:    1. Type 2 diabetes mellitus without complication, without long-term current use of insulin (Primary)  Assessment & Plan:  A1c up slightly from 6.3 to 6.6 as of her 12/23 office visit.  This was secondary to getting full dose Actos out of her regimen.  She is currently on full doses of metformin and Jardiance.  She will continue these as well as moderate dose semaglutide.  If her weight loss slows significantly, and/or if A1c starts  trending up further, we will max out semaglutide at that time.    Orders:  -     Hemoglobin A1c; Future    2. Postmenopause  -     DEXA Bone Density Axial; Future    3. Stage 3a chronic kidney disease  Assessment & Plan:  GFR is trended up further to 65 as of 12/23.  Electrolytes are fine as well.  Patient stable to continue low-dose ACE inhibitor and full dose Jardiance.    Orders:  -     Basic Metabolic Panel; Future    4. Essential hypertension  Assessment & Plan:  Blood pressure with excellent control still in her pulse is around 70 as of 12/23.  So patient is stable to continue with full dose atenolol and just low-dose lisinopril.      5. Morbid obesity  Overview:  BMI is  down from 48 in 3/22 to 46 as of 9/22...BMI down further to 43.5 as of 9/23.---> BMD down further to 40.3 as of 12/23.   Weight is down from 252 to 231 this year---> down further to 213 as of 12/23.  Patient is continuing with conservative dietary restrictions and activity as tolerated.  Additionally she is tolerating the 1 mg dose of Ozempic which is benefiting her diabetes as well.  Continue current plan of care.        6. Mixed hyperlipidemia  Assessment & Plan:  LDL is stable at 58 as of 12/23 office visit.  She can continue with just moderate dose simvastatin.      7. Vitamin D deficiency  -     Vitamin D,25-Hydroxy; Future    8. Well adult exam  -     Vitamin B12 anemia; Future  -     Folate anemia; Future  -     CBC & Differential; Future         Class 3 Severe Obesity (BMI >=40). Obesity-related health conditions include the following: hypertension, diabetes mellitus, dyslipidemias, GERD, and osteoarthritis. Obesity is worsening. BMI is is above average; BMI management plan is completed. We discussed portion control and increasing exercise.     --  --  OLDER NOTES:  VISIT 3/21:  ANNUAL PHYSICAL 2/20:  --  DM with A1C 7.8 on less than prescribed meds b/c was running low; d/w wt loss is goal here and call if low spells; to [place] as well to  "help with diet (micro-alb neg 1/17)...7.3 is good, but goal is 6.8 given age...7.4 \" b/c of my foot and I couldn't exercise\"; will see what Jardiance will cost and try to wean glipizide...6.8 is good drop and is down to 2/1 of glipizide, so increase jardiance now and try 1/1 or 2/0 if drops again...7.7 despite increased Jardaince, so needs 2/1 again and/or get some wt off; agree with new monitor as well...7.2 and rec stay on this dose and diet please (d/w reason for wanting to get off glipizide)...7.4...is up due to being off feet for 6 weeks=8.0, so will try 45 mg actos if no worse swelling...7.1 and d/w yes, she can play with glipizide since having lows...7.3 in 6/20 is fine since only 1/2 dose glipizide now=ditto 9/20 = 7.2---> 7.2 is steady 3/21. (TSH neg 9/20).  OPTHO neg 2/21; MICROALB=  --  HTN remains well controlled. 3/21.  ? FBW4e=06% in 3/21 = no concerns yet.  --  LIPIDS at goal with LDL 69...67---> 88 in 9/20.  SPECT 7/16 with EF 50% and no ischemia (FH CHF=B MI at age 41).  --  FE DEF ANEMIA is up 11.5 to 12.4 and d/w stay on same MVI as of 5/17 OV...13.1 with lowish iron, stay on MVI...stable=defer a while after 2/19 OV---> all labs stable as of 9/20 = stay on MVI.  --  ELEVATED LFTS=wnl as of 1/17... ditto.  GERD w/o dysphagia on PPI and I rec trial of qod; (s/p prior dilation)  --  VIT D DEF=fine 9/20.  BMD neg per Dr Cuellar age 61 and she is scheduled for one soon as of her 10/21 office visit.  --  S/P FALL 10/17 = fell off stool, to [facility], had HCT, balance and paresthesia's since; exam non-focal, neg FTN, neg rhomberg; will get dopplers and review the cat scan, but o/w no tx rec...Dopplers 2/18 were fine; will send to ENT due to sxs with turning head...?  S/P FALL 7/19 = RLE major swelling, had VELE=neg; saw Ortho and they sent to PT...fell again, had MRI=R tibeal plateau fx and was in brace for 6 weeks per Dr Krishnan; has f/u with him before PT to resume; I d/w try replace aleve with tylenol " arthritis.  --  MORBID KVYMOUR=222 is stuck (TSH neg 2/20).  --  --  MMG 9/14/22 per Dr Mac's NP.  COLON 1/22 = polyp x1 = 5 years per Dr. Muir.  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    Follow Up   Return in about 3 months (around 3/6/2024).  Patient was given instructions and counseling regarding her condition or for health maintenance advice. Please see specific information pulled into the AVS if appropriate.

## 2023-12-06 NOTE — ASSESSMENT & PLAN NOTE
LDL is stable at 58 as of 12/23 office visit.  She can continue with just moderate dose simvastatin.

## 2023-12-06 NOTE — ASSESSMENT & PLAN NOTE
A1c up slightly from 6.3 to 6.6 as of her 12/23 office visit.  This was secondary to getting full dose Actos out of her regimen.  She is currently on full doses of metformin and Jardiance.  She will continue these as well as moderate dose semaglutide.  If her weight loss slows significantly, and/or if A1c starts trending up further, we will max out semaglutide at that time.

## 2023-12-06 NOTE — ASSESSMENT & PLAN NOTE
GFR is trended up further to 65 as of 12/23.  Electrolytes are fine as well.  Patient stable to continue low-dose ACE inhibitor and full dose Jardiance.

## 2023-12-12 ENCOUNTER — OFFICE VISIT (OUTPATIENT)
Dept: PODIATRY | Facility: CLINIC | Age: 68
End: 2023-12-12
Payer: MEDICARE

## 2023-12-12 VITALS
WEIGHT: 215 LBS | HEART RATE: 72 BPM | SYSTOLIC BLOOD PRESSURE: 108 MMHG | DIASTOLIC BLOOD PRESSURE: 68 MMHG | BODY MASS INDEX: 40.65 KG/M2 | OXYGEN SATURATION: 98 % | TEMPERATURE: 98.2 F

## 2023-12-12 DIAGNOSIS — L60.0 ONYCHOCRYPTOSIS: Primary | ICD-10-CM

## 2023-12-12 DIAGNOSIS — B35.1 ONYCHOMYCOSIS: ICD-10-CM

## 2023-12-12 DIAGNOSIS — E11.8 DIABETIC FOOT: ICD-10-CM

## 2023-12-12 DIAGNOSIS — E11.9 NON-INSULIN DEPENDENT TYPE 2 DIABETES MELLITUS: ICD-10-CM

## 2023-12-12 DIAGNOSIS — M79.671 FOOT PAIN, BILATERAL: ICD-10-CM

## 2023-12-12 DIAGNOSIS — M79.672 FOOT PAIN, BILATERAL: ICD-10-CM

## 2023-12-12 NOTE — PROGRESS NOTES
[facility] - PODIATRY    Today's Date: 12/12/23    Patient Name: Ashwini Lopez  MRN: [id number]  CSN: [id number]  PCP: Savage Watts MD, Last PCP Visit: 6 December 2023   Referring Provider: No ref. provider found    SUBJECTIVE     Chief Complaint   Patient presents with    Left Foot - Follow-up, Nail Problem    Right Foot - Follow-up, Nail Problem     HPI: Ashwini Lopez, a 68 y.o.female, presents to clinic for painful toenail and a diabetic foot evaluation.    New, Established, New Problem:  est  Location:  Toenails  Duration:   Greater than five years  Onset:  Gradual  Nature:  sore with palpation.  Stable, worsening, improving:   improving  Aggravating factors:  Pain with shoe gear and ambulation.  Previous Treatment: debridement    Patient controlling diabetes via:  oral meds    Patient states their last blood glucose was: 124    Patient denies any fevers, chills, nausea, vomiting, shortness of breath, nor any other constitutional signs nor symptoms.    Medical changes: None    Past Medical History:   Diagnosis Date    Chronic fatigue, unspecified     Diverticulitis     Essential (primary) hypertension     GERD (gastroesophageal reflux disease)     High cholesterol     Iron deficiency anemia, unspecified     Mixed hyperlipidemia     Morbid (severe) obesity due to excess calories     Seasonal allergies     Type 2 diabetes mellitus without complication     Vitamin D deficiency, unspecified      Past Surgical History:   Procedure Laterality Date    BILATERAL BREAST REDUCTION      COLONOSCOPY  2011    [facility]     COLONOSCOPY N/A 01/21/2022    Procedure: COLONOSCOPY WITH POLYPECTOMY;  Surgeon: Savanna Muir MD;  Location: [facility] ENDOSCOPY;  Service: Gastroenterology;  Laterality: N/A;  COLON POLYP, DIVERTICULOSIS, HEMORRHOIDS    ENDOSCOPY N/A 01/21/2022    Procedure: ESOPHAGOGASTRODUODENOSCOPY WITH BIOPSIES;  Surgeon: Savanna Muir MD;  Location: [facility] ENDOSCOPY;   Service: Gastroenterology;  Laterality: N/A;  GASTRIC POLYPS, HIATAL HERNIA    ESOPHAGEAL DILATATION      TONSILLECTOMY      UPPER GASTROINTESTINAL ENDOSCOPY  2011     Family History   Problem Relation Age of Onset    Heart disease Father     Cancer Father     Diabetes Father     Breast cancer Mother 36    Hypertension Mother     Cancer Mother     Heart attack Brother     Breast cancer Maternal Aunt     Malig Hyperthermia Neg Hx     Ovarian cancer Neg Hx     Uterine cancer Neg Hx     Colon cancer Neg Hx     Melanoma Neg Hx     Prostate cancer Neg Hx     Deep vein thrombosis Neg Hx     Pulmonary embolism Neg Hx     Coronary artery disease Neg Hx      Social History     Socioeconomic History    Marital status:    Tobacco Use    Smoking status: Never    Smokeless tobacco: Never   Vaping Use    Vaping Use: Never used   Substance and Sexual Activity    Alcohol use: Not Currently    Drug use: Never    Sexual activity: Yes     Partners: Male     Birth control/protection: Post-menopausal     Allergies   Allergen Reactions    Sulfa Antibiotics Hives     Current Outpatient Medications   Medication Sig Dispense Refill    acetaminophen (TYLENOL) 650 MG 8 hr tablet 1 tablet Every 8 (Eight) Hours As Needed.      aspirin 81 MG EC tablet Take 1 tablet by mouth Daily.      atenolol (TENORMIN) 100 MG tablet TAKE 1 TABLET DAILY 90 tablet 1    calcium carb-cholecalciferol 600-800 MG-UNIT tablet 1 tablet.      Cetirizine HCl (ZyrTEC Allergy) 10 MG capsule Take 1 tablet by mouth Daily.      EPINEPHrine (EPIPEN) 0.3 MG/0.3ML solution auto-injector injection INJECT 0.3 ML INTO THE APPROPRIATE MUSCLE AS DIRECTED BY PRESCRIBER 1 (ONE) TIME FOR 1 DOSE.      Jardiance 25 MG tablet tablet TAKE 1 TABLET DAILY 90 tablet 1    lisinopril (PRINIVIL,ZESTRIL) 10 MG tablet TAKE 1 TABLET DAILY 90 tablet 1    metFORMIN (GLUCOPHAGE) 1000 MG tablet TAKE 1 TABLET TWICE A  tablet 1    Multiple Vitamins-Iron (multivitamin with iron) tablet  tablet 1 tablet.      Multiple Vitamins-Minerals (PRESERVISION AREDS 2 PO) 1 tablet.      omeprazole (priLOSEC) 20 MG capsule TAKE 1 CAPSULE DAILY 90 capsule 1    Semaglutide, 1 MG/DOSE, (OZEMPIC) 4 MG/3ML solution pen-injector Inject 1 mg under the skin into the appropriate area as directed 1 (One) Time Per Week. 9 mL 1    simvastatin (ZOCOR) 40 MG tablet TAKE 1 TABLET DAILY 90 tablet 1     No current facility-administered medications for this visit.     Review of Systems   Constitutional: Negative.    Skin:         Painful toenails.   All other systems reviewed and are negative.      OBJECTIVE     Vitals:    12/12/23 0813   BP: 108/68   Pulse: 72   Temp: 98.2 °F (36.8 °C)   SpO2: 98%       Body mass index is 40.65 kg/m².    Lab Results   Component Value Date    HGBA1C 6.60 (H) 11/30/2023       Lab Results   Component Value Date    GLUCOSE 151 (H) 11/30/2023    CALCIUM 10.2 11/30/2023     11/30/2023    K 4.6 11/30/2023    CO2 25.0 11/30/2023     11/30/2023    BUN 12 11/30/2023    CREATININE 0.96 11/30/2023    EGFRIFNONA 61 02/28/2022    BCR 12.5 11/30/2023    ANIONGAP 13.0 11/30/2023       Patient seen in no apparent distress.      PHYSICAL EXAM:     Foot/Ankle Exam    GENERAL  Diabetic foot exam performed    Appearance:  obese  Orientation:  AAOx3  Affect:  appropriate  Gait:  unimpaired  Assistance:  independent  Right shoe gear: casual shoe  Left shoe gear: casual shoe    VASCULAR     Right Foot Vascularity   Dorsalis pedis:  2+  Posterior tibial:  2+  Skin temperature:  cool  Edema grading:  None  CFT:  < 3 seconds  Pedal hair growth:  Absent  Varicosities:  moderate varicosities     Left Foot Vascularity   Dorsalis pedis:  2+  Posterior tibial:  2+  Skin temperature:  cool  Edema grading:  None  CFT:  < 3 seconds  Pedal hair growth:  Absent  Varicosities:  moderate varicosities     NEUROLOGIC     Right Foot Neurologic   Normal sensation    Light touch sensation: normal  Vibratory sensation:  normal  Hot/Cold sensation: normal  Protective Sensation using Hollis-Sunil Monofilament:   Sites intact: 10  Sites tested: 10     Left Foot Neurologic   Normal sensation    Light touch sensation: normal  Vibratory sensation: normal  Hot/Cold sensation:  normal  Protective Sensation using Hollis-Sunil Monofilament:   Sites intact: 10  Sites tested: 10    MUSCLE STRENGTH     Right Foot Muscle Strength   Foot dorsiflexion:  4  Foot plantar flexion:  4  Foot inversion:  4  Foot eversion:  4     Left Foot Muscle Strength   Foot dorsiflexion:  4  Foot plantar flexion:  4  Foot inversion:  4  Foot eversion:  4    RANGE OF MOTION     Right Foot Range of Motion   Foot and ankle ROM within normal limits       Left Foot Range of Motion   Foot and ankle ROM within normal limits      DERMATOLOGIC      Right Foot Dermatologic   Skin  Right foot skin is intact.   Nails  1.  Positive for elongated, onychomycosis, abnormal thickness, subungual debris and ingrown toenail.  Nails comment:  Toenails 1, 2, 3, 4, and 5     Left Foot Dermatologic   Skin  Left foot skin is intact.   Nails comment:  Toenails 1, 2, 3, 4, and 5  Nails  1.  Positive for elongated, onychomycosis, abnormal thickness, subungual debris and ingrown toenail.      Diabetic Foot Exam Performed and Monofilament Test Performed    ASSESSMENT/PLAN     Diagnoses and all orders for this visit:    1. Onychocryptosis (Primary)    2. Non-insulin dependent type 2 diabetes mellitus    3. Diabetic foot    4. Foot pain, bilateral    5. Onychomycosis        Comprehensive lower extremity examination and evaluation was performed.    Discussed findings and treatment plan including risks, benefits, and treatment options with patient in detail. Patient agreed with treatment plan.    Medications and allergies reviewed.  Reviewed available blood glucose and HgB A1C lab values along with other pertinent labs.  These were discussed with the patient as to their importance of  diabetic maintenance.    Toenails 1 on Right and 1 on Left were debrided with nail nippers then filed with a Denilsonmel nail iliana.  Patient tolerated procedure well without complications.    Diabetic foot exam performed and documented this date, compliant with CQM required standards. Detail of findings as noted in physical exam.  Lower extremity Neurologic exam for diabetic patient performed and documented this date, compliant with PQRS required standards. Detail of findings as noted in physical exam.  Advised patient importance of good routine lower extremity hygiene. Advised patient importance of evaluating for intact skin and pain free nail borders.  Advised patient to use mirror to evaluate plantar/ soles of feet for better visualization. Advised patient monitor and phone office to be seen if any cracking to skin, open lesions, painful nail borders or if nails become elongated prior to next visit. Advised patient importance of daily cleansing of lower extremities, followed by good skin cream to maintain normal hydration of skin. Also advised patient importance of close daily monitoring of blood sugar. Advised to regulate diet and medications to maintain control of blood sugar in optimal range. Contact primary care provider if difficulties maintaining blood sugar levels.  Advised Patient of presence of Diabetes Mellitus condition.  Advised Patient risk of progression and worsening or improvement, then return of condition.  Will monitor condition for any change in future. Treat with most appropriate treatment pending status of condition.  Counseled and advised patient extensively on nature and ramifications of diabetes. Standard instructions given to patient for good diabetic foot care and maintenance. Advised importance of careful monitoring to avoid break down and complications secondary to diabetes. Advised patient importance of strict maintenance of blood sugar control. Advised patient of possible ominous  results from neglect of condition, i.e.: amputation/ loss of digits, feet and legs, or even death.  Patient states understands counseling, will monitor closely, continue good hygiene and routine diabetic foot care. Patient will contact office should they have any questions or problems.      An After Visit Summary was printed and given to the patient at discharge, including (if requested) any available informative/educational handouts regarding diagnosis, treatment, or medications. All questions were answered to patient/family satisfaction. Should symptoms fail to improve or worsen they agree to call or return to clinic or to go to the Emergency Department. Discussed the importance of following up with any needed screening tests/labs/specialist appointments and any requested follow-up recommended by me today. Importance of maintaining follow-up discussed and patient accepts that missed appointments can delay diagnosis and potentially lead to worsening of conditions.    Return in about 9 weeks (around 2/13/2024) for Toenail Care., or sooner if acute issues arise.    This document has been electronically signed by Jim Nj DPM on December 12, 2023 08:38 EST

## 2024-01-03 ENCOUNTER — HOSPITAL ENCOUNTER (OUTPATIENT)
Dept: BONE DENSITY | Facility: HOSPITAL | Age: 69
Discharge: HOME | End: 2024-01-03
Admitting: INTERNAL MEDICINE
Payer: MEDICARE

## 2024-01-03 DIAGNOSIS — Z78.0 POSTMENOPAUSE: ICD-10-CM

## 2024-01-03 PROCEDURE — 77080 DXA BONE DENSITY AXIAL: CPT

## 2024-01-17 ENCOUNTER — HOSPITAL ENCOUNTER (OUTPATIENT)
Dept: MAMMOGRAPHY | Facility: HOSPITAL | Age: 69
Discharge: HOME | End: 2024-01-17
Admitting: INTERNAL MEDICINE
Payer: MEDICARE

## 2024-01-17 DIAGNOSIS — Z12.31 VISIT FOR SCREENING MAMMOGRAM: ICD-10-CM

## 2024-01-17 PROCEDURE — 77067 SCR MAMMO BI INCL CAD: CPT

## 2024-01-17 PROCEDURE — 77063 BREAST TOMOSYNTHESIS BI: CPT

## 2024-02-08 RX ORDER — SIMVASTATIN 40 MG
40 TABLET ORAL DAILY
Qty: 90 TABLET | Refills: 1 | Status: SHIPPED | OUTPATIENT
Start: 2024-02-08

## 2024-02-08 RX ORDER — ATENOLOL 100 MG/1
100 TABLET ORAL DAILY
Qty: 90 TABLET | Refills: 1 | Status: SHIPPED | OUTPATIENT
Start: 2024-02-08

## 2024-02-08 RX ORDER — OMEPRAZOLE 20 MG/1
20 CAPSULE, DELAYED RELEASE ORAL DAILY
Qty: 90 CAPSULE | Refills: 1 | Status: SHIPPED | OUTPATIENT
Start: 2024-02-08

## 2024-02-08 RX ORDER — LISINOPRIL 10 MG/1
10 TABLET ORAL DAILY
Qty: 90 TABLET | Refills: 1 | Status: SHIPPED | OUTPATIENT
Start: 2024-02-08

## 2024-02-28 ENCOUNTER — LAB (OUTPATIENT)
Dept: LAB | Facility: HOSPITAL | Age: 69
End: 2024-02-28
Payer: MEDICARE

## 2024-02-28 DIAGNOSIS — Z00.00 WELL ADULT EXAM: ICD-10-CM

## 2024-02-28 DIAGNOSIS — E11.9 TYPE 2 DIABETES MELLITUS WITHOUT COMPLICATION, WITHOUT LONG-TERM CURRENT USE OF INSULIN: ICD-10-CM

## 2024-02-28 DIAGNOSIS — E55.9 VITAMIN D DEFICIENCY: ICD-10-CM

## 2024-02-28 DIAGNOSIS — N18.31 STAGE 3A CHRONIC KIDNEY DISEASE: ICD-10-CM

## 2024-02-28 LAB
25(OH)D3 SERPL-MCNC: 55.5 NG/ML (ref 30–100)
ANION GAP SERPL CALCULATED.3IONS-SCNC: 12.4 MMOL/L (ref 5–15)
BASOPHILS # BLD AUTO: 0.06 10*3/MM3 (ref 0–0.2)
BASOPHILS NFR BLD AUTO: 0.8 % (ref 0–1.5)
BUN SERPL-MCNC: 13 MG/DL (ref 8–23)
BUN/CREAT SERPL: 14 (ref 7–25)
CALCIUM SPEC-SCNC: 9.9 MG/DL (ref 8.6–10.5)
CHLORIDE SERPL-SCNC: 105 MMOL/L (ref 98–107)
CO2 SERPL-SCNC: 24.6 MMOL/L (ref 22–29)
CREAT SERPL-MCNC: 0.93 MG/DL (ref 0.57–1)
DEPRECATED RDW RBC AUTO: 34.6 FL (ref 37–54)
EGFRCR SERPLBLD CKD-EPI 2021: 67.5 ML/MIN/1.73
EOSINOPHIL # BLD AUTO: 0.17 10*3/MM3 (ref 0–0.4)
EOSINOPHIL NFR BLD AUTO: 2.3 % (ref 0.3–6.2)
ERYTHROCYTE [DISTWIDTH] IN BLOOD BY AUTOMATED COUNT: 11.6 % (ref 12.3–15.4)
FOLATE SERPL-MCNC: >20 NG/ML (ref 4.78–24.2)
GLUCOSE SERPL-MCNC: 123 MG/DL (ref 65–99)
HBA1C MFR BLD: 7 % (ref 4.8–5.6)
HCT VFR BLD AUTO: 42.1 % (ref 34–46.6)
HGB BLD-MCNC: 13.5 G/DL (ref 12–15.9)
IMM GRANULOCYTES # BLD AUTO: 0.03 10*3/MM3 (ref 0–0.05)
IMM GRANULOCYTES NFR BLD AUTO: 0.4 % (ref 0–0.5)
LYMPHOCYTES # BLD AUTO: 2.5 10*3/MM3 (ref 0.7–3.1)
LYMPHOCYTES NFR BLD AUTO: 33.7 % (ref 19.6–45.3)
MCH RBC QN AUTO: 26.9 PG (ref 26.6–33)
MCHC RBC AUTO-ENTMCNC: 32.1 G/DL (ref 31.5–35.7)
MCV RBC AUTO: 84 FL (ref 79–97)
MONOCYTES # BLD AUTO: 0.6 10*3/MM3 (ref 0.1–0.9)
MONOCYTES NFR BLD AUTO: 8.1 % (ref 5–12)
NEUTROPHILS NFR BLD AUTO: 4.05 10*3/MM3 (ref 1.7–7)
NEUTROPHILS NFR BLD AUTO: 54.7 % (ref 42.7–76)
NRBC BLD AUTO-RTO: 0 /100 WBC (ref 0–0.2)
PLATELET # BLD AUTO: 355 10*3/MM3 (ref 140–450)
PMV BLD AUTO: 10.5 FL (ref 6–12)
POTASSIUM SERPL-SCNC: 5.1 MMOL/L (ref 3.5–5.2)
RBC # BLD AUTO: 5.01 10*6/MM3 (ref 3.77–5.28)
SODIUM SERPL-SCNC: 142 MMOL/L (ref 136–145)
VIT B12 BLD-MCNC: 273 PG/ML (ref 211–946)
WBC NRBC COR # BLD AUTO: 7.41 10*3/MM3 (ref 3.4–10.8)

## 2024-02-28 PROCEDURE — 83036 HEMOGLOBIN GLYCOSYLATED A1C: CPT

## 2024-02-28 PROCEDURE — 85025 COMPLETE CBC W/AUTO DIFF WBC: CPT

## 2024-02-28 PROCEDURE — 82746 ASSAY OF FOLIC ACID SERUM: CPT

## 2024-02-28 PROCEDURE — 82607 VITAMIN B-12: CPT

## 2024-02-28 PROCEDURE — 82306 VITAMIN D 25 HYDROXY: CPT

## 2024-02-28 PROCEDURE — 36415 COLL VENOUS BLD VENIPUNCTURE: CPT

## 2024-02-28 PROCEDURE — 80048 BASIC METABOLIC PNL TOTAL CA: CPT

## 2024-03-04 ENCOUNTER — OFFICE VISIT (OUTPATIENT)
Dept: INTERNAL MEDICINE | Facility: CLINIC | Age: 69
End: 2024-03-04
Payer: MEDICARE

## 2024-03-04 VITALS
DIASTOLIC BLOOD PRESSURE: 80 MMHG | WEIGHT: 207.4 LBS | OXYGEN SATURATION: 98 % | BODY MASS INDEX: 39.16 KG/M2 | TEMPERATURE: 96.8 F | HEIGHT: 61 IN | SYSTOLIC BLOOD PRESSURE: 120 MMHG | RESPIRATION RATE: 18 BRPM | HEART RATE: 73 BPM

## 2024-03-04 DIAGNOSIS — N18.31 STAGE 3A CHRONIC KIDNEY DISEASE: ICD-10-CM

## 2024-03-04 DIAGNOSIS — E11.9 TYPE 2 DIABETES MELLITUS WITHOUT COMPLICATION, WITHOUT LONG-TERM CURRENT USE OF INSULIN: Primary | ICD-10-CM

## 2024-03-04 DIAGNOSIS — E55.9 VITAMIN D DEFICIENCY: ICD-10-CM

## 2024-03-04 DIAGNOSIS — E78.2 MIXED HYPERLIPIDEMIA: ICD-10-CM

## 2024-03-04 DIAGNOSIS — I10 ESSENTIAL HYPERTENSION: ICD-10-CM

## 2024-03-04 NOTE — ASSESSMENT & PLAN NOTE
Blood pressure halima stable as of 3/24 office visit.  Patient gets good readings at home as well.  Her pulse is in the mid 70s.  She stable to continue with just low-dose lisinopril and full dose atenolol.

## 2024-03-04 NOTE — PROGRESS NOTES
"Chief Complaint  Diabetes (Patient is here for a 3 month follow up, lab work follow up. Patient would like to discuss upping the dose on Ozempic )    Subjective          Ashwini Lopez presents to [facility]     History of Present Illness:  Patient is a 68-year-old female with underlying diabetes mellitus on oral agents, hypertension, hyperlipidemia, among others, who is here 3/24 for routine 3-4-month diabetic follow-up.  We will review her med list, go over her labs in detail, and address any new concerns she may have.    Review of Systems   Constitutional:  Negative for appetite change, fatigue, fever and unexpected weight loss.   HENT:  Negative for congestion, ear pain, rhinorrhea and sore throat.    Eyes:  Negative for blurred vision and pain.   Respiratory:  Negative for cough, chest tightness, shortness of breath and wheezing.    Cardiovascular:  Negative for chest pain, palpitations and leg swelling.   Gastrointestinal:  Negative for abdominal pain, diarrhea and vomiting.   Endocrine: Negative for polydipsia, polyphagia and polyuria.   Genitourinary:  Negative for difficulty urinating, dysuria and hematuria.   Musculoskeletal:  Negative for arthralgias and gait problem.   Skin:  Positive for rash. Negative for pallor and skin lesions.   Neurological:  Negative for dizziness, tremors, seizures, syncope, speech difficulty, weakness, memory problem and confusion.   Psychiatric/Behavioral:  Negative for self-injury and depressed mood. The patient is not nervous/anxious.        Objective   Vital Signs:   /80 (BP Location: Left arm, Patient Position: Sitting, Cuff Size: Large Adult)   Pulse 73   Temp 96.8 °F (36 °C)   Resp 18   Ht 154.9 cm (60.98\")   Wt 94.1 kg (207 lb 6.4 oz)   SpO2 98%   BMI 39.21 kg/m²           Physical Exam  Vitals and nursing note reviewed.   Constitutional:       General: She is not in acute distress.     Appearance: Normal appearance. She " is not toxic-appearing.   HENT:      Head: Atraumatic.      Right Ear: External ear normal.      Left Ear: External ear normal.      Nose: Nose normal.      Mouth/Throat:      Mouth: Mucous membranes are moist.   Eyes:      General:         Right eye: No discharge.         Left eye: No discharge.      Extraocular Movements: Extraocular movements intact.      Pupils: Pupils are equal, round, and reactive to light.   Cardiovascular:      Rate and Rhythm: Normal rate and regular rhythm.      Pulses: Normal pulses.      Heart sounds: Normal heart sounds. No murmur heard.     No gallop.   Pulmonary:      Effort: Pulmonary effort is normal. No respiratory distress.      Breath sounds: No wheezing, rhonchi or rales.   Abdominal:      General: There is no distension.      Palpations: Abdomen is soft. There is no mass.      Tenderness: There is no abdominal tenderness. There is no guarding.   Musculoskeletal:         General: No swelling or tenderness.      Cervical back: No tenderness.      Right lower leg: No edema.      Left lower leg: No edema.   Skin:     General: Skin is warm and dry.      Findings: No rash.   Neurological:      General: No focal deficit present.      Mental Status: She is alert and oriented to person, place, and time. Mental status is at baseline.      Motor: No weakness.      Gait: Gait normal.   Psychiatric:         Mood and Affect: Mood normal.         Thought Content: Thought content normal.          Result Review :   The following data was reviewed by: Savage Watts MD on 07/13/2021:                  Assessment and Plan    Diagnoses and all orders for this visit:    1. Type 2 diabetes mellitus without complication, without long-term current use of insulin (Primary)  Assessment & Plan:  A1c is up slightly over the holidays from 6.6 to 7.0.  It is trended up twice as we have withdrawn glipizide and then Actos.  However her weight has continued to trend down she is at 207 presently.  Or maxed out on  "metformin and Jardiance, but we have room with semaglutide, so we will go to 2 mg at this time.    Orders:  -     Hemoglobin A1c; Future    2. Stage 3a chronic kidney disease  Assessment & Plan:  GFR stable above 60 x 2 now as of 3/24.  Will continue to monitor with just routine labs.    Orders:  -     Comprehensive Metabolic Panel; Future    3. Essential hypertension  Assessment & Plan:  Blood pressure halima stable as of 3/24 office visit.  Patient gets good readings at home as well.  Her pulse is in the mid 70s.  She stable to continue with just low-dose lisinopril and full dose atenolol.      4. Mixed hyperlipidemia  -     Lipid Panel; Future    5. Vitamin D deficiency  Assessment & Plan:  Vitamin D stable at 55 as of 3/24 OV.  Stable on low-dose over-the-counter supplementation.      Other orders  -     Semaglutide, 2 MG/DOSE, (OZEMPIC) 8 MG/3ML solution pen-injector; Inject 2 mg under the skin into the appropriate area as directed 1 (One) Time Per Week.  Dispense: 9 mL; Refill: 1           Class 3 Severe Obesity (BMI >=40). Obesity-related health conditions include the following: hypertension, diabetes mellitus, dyslipidemias, GERD, and osteoarthritis. Obesity is worsening. BMI is is above average; BMI management plan is completed. We discussed portion control and increasing exercise.     --  --  OLDER NOTES:  VISIT 3/21:  ANNUAL PHYSICAL 2/20:  --  DM with A1C 7.8 on less than prescribed meds b/c was running low; d/w wt loss is goal here and call if low spells; to [place] as well to help with diet (micro-alb neg 1/17)...7.3 is good, but goal is 6.8 given age...7.4 \" b/c of my foot and I couldn't exercise\"; will see what Jardiance will cost and try to wean glipizide...6.8 is good drop and is down to 2/1 of glipizide, so increase jardiance now and try 1/1 or 2/0 if drops again...7.7 despite increased Jardaince, so needs 2/1 again and/or get some wt off; agree with new monitor as well...7.2 and rec stay on this dose " and diet please (d/w reason for wanting to get off glipizide)...7.4...is up due to being off feet for 6 weeks=8.0, so will try 45 mg actos if no worse swelling...7.1 and d/w yes, she can play with glipizide since having lows...7.3 in 6/20 is fine since only 1/2 dose glipizide now=ditto 9/20 = 7.2---> 7.2 is steady 3/21. (TSH neg 9/20).  OPTHO neg 2/21; MICROALB=  --  HTN remains well controlled. 3/21.  ? MPP5u=21% in 3/21 = no concerns yet.  --  LIPIDS at goal with LDL 69...67---> 88 in 9/20.  SPECT 7/16 with EF 50% and no ischemia (FH CHF=B MI at age 41).  --  FE DEF ANEMIA is up 11.5 to 12.4 and d/w stay on same MVI as of 5/17 OV...13.1 with lowish iron, stay on MVI...stable=defer a while after 2/19 OV---> all labs stable as of 9/20 = stay on MVI.  --  ELEVATED LFTS=wnl as of 1/17... ditto.  GERD w/o dysphagia on PPI and I rec trial of qod; (s/p prior dilation)  --  VIT D DEF=fine 9/20.  BMD neg per Dr Cuellar age 61 and she is scheduled for one soon as of her 10/21 office visit.  --  S/P FALL 10/17 = fell off stool, to [facility], had HCT, balance and paresthesia's since; exam non-focal, neg FTN, neg rhomberg; will get dopplers and review the cat scan, but o/w no tx rec...Dopplers 2/18 were fine; will send to ENT due to sxs with turning head...?  S/P FALL 7/19 = RLE major swelling, had VELE=neg; saw Ortho and they sent to PT...fell again, had MRI=R tibeal plateau fx and was in brace for 6 weeks per Dr Krishnan; has f/u with him before PT to resume; I d/w try replace aleve with tylenol arthritis.  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  --  --  MMG 1/17/2024 per Dr Mac's NP.  COLON 1/22 = polyp x1 = 5 years per Dr. Muir.  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    Follow Up   Return in about 4 months (around 7/4/2024).  Patient was given instructions and counseling regarding her condition or for  health maintenance advice. Please see specific information pulled into the AVS if appropriate.

## 2024-03-04 NOTE — ASSESSMENT & PLAN NOTE
A1c is up slightly over the holidays from 6.6 to 7.0.  It is trended up twice as we have withdrawn glipizide and then Actos.  However her weight has continued to trend down she is at 207 presently.  Or maxed out on metformin and Jardiance, but we have room with semaglutide, so we will go to 2 mg at this time.

## 2024-03-05 ENCOUNTER — OFFICE VISIT (OUTPATIENT)
Dept: PODIATRY | Facility: CLINIC | Age: 69
End: 2024-03-05
Payer: MEDICARE

## 2024-03-05 VITALS
WEIGHT: 206 LBS | HEIGHT: 61 IN | BODY MASS INDEX: 38.89 KG/M2 | HEART RATE: 77 BPM | DIASTOLIC BLOOD PRESSURE: 77 MMHG | TEMPERATURE: 97.1 F | OXYGEN SATURATION: 93 % | SYSTOLIC BLOOD PRESSURE: 120 MMHG

## 2024-03-05 DIAGNOSIS — E11.8 DIABETIC FOOT: ICD-10-CM

## 2024-03-05 DIAGNOSIS — L60.0 ONYCHOCRYPTOSIS: Primary | ICD-10-CM

## 2024-03-05 DIAGNOSIS — E11.9 NON-INSULIN DEPENDENT TYPE 2 DIABETES MELLITUS: ICD-10-CM

## 2024-03-05 DIAGNOSIS — M79.671 FOOT PAIN, BILATERAL: ICD-10-CM

## 2024-03-05 DIAGNOSIS — M79.672 FOOT PAIN, BILATERAL: ICD-10-CM

## 2024-03-05 DIAGNOSIS — B35.1 ONYCHOMYCOSIS: ICD-10-CM

## 2024-03-05 PROCEDURE — 3074F SYST BP LT 130 MM HG: CPT | Performed by: PODIATRIST

## 2024-03-05 PROCEDURE — 11721 DEBRIDE NAIL 6 OR MORE: CPT | Performed by: PODIATRIST

## 2024-03-05 PROCEDURE — G8404 LOW EXTEMITY NEUR EXAM DOCUM: HCPCS | Performed by: PODIATRIST

## 2024-03-05 PROCEDURE — 1160F RVW MEDS BY RX/DR IN RCRD: CPT | Performed by: PODIATRIST

## 2024-03-05 PROCEDURE — 99213 OFFICE O/P EST LOW 20 MIN: CPT | Performed by: PODIATRIST

## 2024-03-05 PROCEDURE — 1159F MED LIST DOCD IN RCRD: CPT | Performed by: PODIATRIST

## 2024-03-05 PROCEDURE — 3078F DIAST BP <80 MM HG: CPT | Performed by: PODIATRIST

## 2024-03-05 NOTE — PROGRESS NOTES
[facility] - PODIATRY    Today's Date: 03/05/24    Patient Name: Ashwini Lopez  MRN: [id number]  CSN: [id number]  PCP: Savage Watts MD, Last PCP Visit: 3/4/2024  Referring Provider: No ref. provider found    SUBJECTIVE     Chief Complaint   Patient presents with    Left Foot - Follow-up, Nail Problem    Right Foot - Follow-up, Nail Problem     HPI: Ashwini Lopez, a 68 y.o.female, presents to clinic for painful toenail and a diabetic foot evaluation.    New, Established, New Problem:  est  Location:  Toenails  Duration:   Greater than five years  Onset:  Gradual  Nature:  sore with palpation.  Stable, worsening, improving:   improving  Aggravating factors:  Pain with shoe gear and ambulation.  Previous Treatment: debridement    Patient controlling diabetes via:  oral meds    Patient states their last blood glucose was: 108    Patient denies any fevers, chills, nausea, vomiting, shortness of breath, nor any other constitutional signs nor symptoms.    Medical changes: increased Ozempic dose     Past Medical History:   Diagnosis Date    Chronic fatigue, unspecified     Diverticulitis     Essential (primary) hypertension     GERD (gastroesophageal reflux disease)     High cholesterol     Iron deficiency anemia, unspecified     Mixed hyperlipidemia     Morbid (severe) obesity due to excess calories     Seasonal allergies     Type 2 diabetes mellitus without complication     Vitamin D deficiency, unspecified      Past Surgical History:   Procedure Laterality Date    BILATERAL BREAST REDUCTION      COLONOSCOPY  2011    [facility]     COLONOSCOPY N/A 01/21/2022    Procedure: COLONOSCOPY WITH POLYPECTOMY;  Surgeon: Savanna Muir MD;  Location: [facility] ENDOSCOPY;  Service: Gastroenterology;  Laterality: N/A;  COLON POLYP, DIVERTICULOSIS, HEMORRHOIDS    ENDOSCOPY N/A 01/21/2022    Procedure: ESOPHAGOGASTRODUODENOSCOPY WITH BIOPSIES;  Surgeon: Savanna Muir MD;  Location: [facility]  ENDOSCOPY;  Service: Gastroenterology;  Laterality: N/A;  GASTRIC POLYPS, HIATAL HERNIA    ESOPHAGEAL DILATATION      TONSILLECTOMY      UPPER GASTROINTESTINAL ENDOSCOPY  2011     Family History   Problem Relation Age of Onset    Heart disease Father     Cancer Father     Diabetes Father     Breast cancer Mother 36    Hypertension Mother     Cancer Mother     Heart attack Brother     Breast cancer Maternal Aunt     Malig Hyperthermia Neg Hx     Ovarian cancer Neg Hx     Uterine cancer Neg Hx     Colon cancer Neg Hx     Melanoma Neg Hx     Prostate cancer Neg Hx     Deep vein thrombosis Neg Hx     Pulmonary embolism Neg Hx     Coronary artery disease Neg Hx      Social History     Socioeconomic History    Marital status:    Tobacco Use    Smoking status: Never    Smokeless tobacco: Never   Vaping Use    Vaping status: Never Used   Substance and Sexual Activity    Alcohol use: Not Currently    Drug use: Never    Sexual activity: Yes     Partners: Male     Birth control/protection: Post-menopausal     Allergies   Allergen Reactions    Sulfa Antibiotics Hives     Current Outpatient Medications   Medication Sig Dispense Refill    acetaminophen (TYLENOL) 650 MG 8 hr tablet 1 tablet Every 8 (Eight) Hours As Needed.      aspirin 81 MG EC tablet Take 1 tablet by mouth Daily.      atenolol (TENORMIN) 100 MG tablet Take 1 tablet by mouth Daily. 90 tablet 1    calcium carb-cholecalciferol 600-800 MG-UNIT tablet 1 tablet.      Cetirizine HCl (ZyrTEC Allergy) 10 MG capsule Take 1 tablet by mouth Daily.      empagliflozin (Jardiance) 25 MG tablet tablet Take 1 tablet by mouth Daily. 90 tablet 1    EPINEPHrine (EPIPEN) 0.3 MG/0.3ML solution auto-injector injection INJECT 0.3 ML INTO THE APPROPRIATE MUSCLE AS DIRECTED BY PRESCRIBER 1 (ONE) TIME FOR 1 DOSE.      lisinopril (PRINIVIL,ZESTRIL) 10 MG tablet Take 1 tablet by mouth Daily. 90 tablet 1    metFORMIN (GLUCOPHAGE) 1000 MG tablet Take 1 tablet by mouth 2 (Two) Times a  Day. 180 tablet 1    Multiple Vitamins-Iron (multivitamin with iron) tablet tablet 1 tablet.      Multiple Vitamins-Minerals (PRESERVISION AREDS 2 PO) 1 tablet.      omeprazole (priLOSEC) 20 MG capsule Take 1 capsule by mouth Daily. 90 capsule 1    Semaglutide, 2 MG/DOSE, (OZEMPIC) 8 MG/3ML solution pen-injector Inject 2 mg under the skin into the appropriate area as directed 1 (One) Time Per Week. 9 mL 1    simvastatin (ZOCOR) 40 MG tablet Take 1 tablet by mouth Daily. 90 tablet 1     No current facility-administered medications for this visit.     Review of Systems   Constitutional: Negative.    Skin:         Painful toenails.   All other systems reviewed and are negative.      OBJECTIVE     Vitals:    03/05/24 0831   BP: 120/77   Pulse: 77   Temp: 97.1 °F (36.2 °C)   SpO2: 93%       Body mass index is 38.92 kg/m².    Lab Results   Component Value Date    HGBA1C 7.00 (H) 02/28/2024       Lab Results   Component Value Date    GLUCOSE 123 (H) 02/28/2024    CALCIUM 9.9 02/28/2024     02/28/2024    K 5.1 02/28/2024    CO2 24.6 02/28/2024     02/28/2024    BUN 13 02/28/2024    CREATININE 0.93 02/28/2024    EGFRIFNONA 61 02/28/2022    BCR 14.0 02/28/2024    ANIONGAP 12.4 02/28/2024       Patient seen in no apparent distress.      PHYSICAL EXAM:     Foot/Ankle Exam    GENERAL  Diabetic foot exam performed    Appearance:  obese  Orientation:  AAOx3  Affect:  appropriate  Gait:  unimpaired  Assistance:  independent  Right shoe gear: casual shoe  Left shoe gear: casual shoe    VASCULAR     Right Foot Vascularity   Dorsalis pedis:  2+  Posterior tibial:  2+  Skin temperature:  cool  Edema grading:  None  CFT:  < 3 seconds  Pedal hair growth:  Absent  Varicosities:  moderate varicosities     Left Foot Vascularity   Dorsalis pedis:  2+  Posterior tibial:  2+  Skin temperature:  cool  Edema grading:  None  CFT:  < 3 seconds  Pedal hair growth:  Absent  Varicosities:  moderate varicosities     NEUROLOGIC     Right  Foot Neurologic   Normal sensation    Light touch sensation: normal  Vibratory sensation: normal  Hot/Cold sensation: normal  Protective Sensation using Flippin-Sunil Monofilament:   Sites intact: 10  Sites tested: 10     Left Foot Neurologic   Normal sensation    Light touch sensation: normal  Vibratory sensation: normal  Hot/Cold sensation:  normal  Protective Sensation using Flippin-Sunil Monofilament:   Sites intact: 10  Sites tested: 10    MUSCLE STRENGTH     Right Foot Muscle Strength   Foot dorsiflexion:  4  Foot plantar flexion:  4  Foot inversion:  4  Foot eversion:  4     Left Foot Muscle Strength   Foot dorsiflexion:  4  Foot plantar flexion:  4  Foot inversion:  4  Foot eversion:  4    RANGE OF MOTION     Right Foot Range of Motion   Foot and ankle ROM within normal limits       Left Foot Range of Motion   Foot and ankle ROM within normal limits      DERMATOLOGIC      Right Foot Dermatologic   Skin  Right foot skin is intact.   Nails  1.  Positive for elongated, onychomycosis, abnormal thickness, subungual debris and ingrown toenail.  Nails comment:  Toenails 1, 2, 3, 4, and 5     Left Foot Dermatologic   Skin  Left foot skin is intact.   Nails comment:  Toenails 1, 2, 3, 4, and 5  Nails  1.  Positive for elongated, onychomycosis, abnormal thickness, subungual debris and ingrown toenail.    Diabetic Foot Exam Performed and Monofilament Test Performed    I have reexamined the patient the results are consistent with the previously documented exam.    ASSESSMENT/PLAN     Diagnoses and all orders for this visit:    1. Onychocryptosis (Primary)    2. Non-insulin dependent type 2 diabetes mellitus    3. Diabetic foot    4. Foot pain, bilateral    5. Onychomycosis    Comprehensive lower extremity examination and evaluation was performed.    Discussed findings and treatment plan including risks, benefits, and treatment options with patient in detail. Patient agreed with treatment plan.    Medications and  allergies reviewed.  Reviewed available blood glucose and HgB A1C lab values along with other pertinent labs.  These were discussed with the patient as to their importance of diabetic maintenance.    Toenails 1 on Right and 1 on Left were debrided with nail nippers then filed with a Dremel nail iliana.  Patient tolerated procedure well without complications.    Diabetic foot exam performed and documented this date, compliant with CQM required standards. Detail of findings as noted in physical exam.  Lower extremity Neurologic exam for diabetic patient performed and documented this date, compliant with PQRS required standards. Detail of findings as noted in physical exam.  Advised patient importance of good routine lower extremity hygiene. Advised patient importance of evaluating for intact skin and pain free nail borders.  Advised patient to use mirror to evaluate plantar/ soles of feet for better visualization. Advised patient monitor and phone office to be seen if any cracking to skin, open lesions, painful nail borders or if nails become elongated prior to next visit. Advised patient importance of daily cleansing of lower extremities, followed by good skin cream to maintain normal hydration of skin. Also advised patient importance of close daily monitoring of blood sugar. Advised to regulate diet and medications to maintain control of blood sugar in optimal range. Contact primary care provider if difficulties maintaining blood sugar levels.  Advised Patient of presence of Diabetes Mellitus condition.  Advised Patient risk of progression and worsening or improvement, then return of condition.  Will monitor condition for any change in future. Treat with most appropriate treatment pending status of condition.  Counseled and advised patient extensively on nature and ramifications of diabetes. Standard instructions given to patient for good diabetic foot care and maintenance. Advised importance of careful monitoring  to avoid break down and complications secondary to diabetes. Advised patient importance of strict maintenance of blood sugar control. Advised patient of possible ominous results from neglect of condition, i.e.: amputation/ loss of digits, feet and legs, or even death.  Patient states understands counseling, will monitor closely, continue good hygiene and routine diabetic foot care. Patient will contact office should they have any questions or problems.      An After Visit Summary was printed and given to the patient at discharge, including (if requested) any available informative/educational handouts regarding diagnosis, treatment, or medications. All questions were answered to patient/family satisfaction. Should symptoms fail to improve or worsen they agree to call or return to clinic or to go to the Emergency Department. Discussed the importance of following up with any needed screening tests/labs/specialist appointments and any requested follow-up recommended by me today. Importance of maintaining follow-up discussed and patient accepts that missed appointments can delay diagnosis and potentially lead to worsening of conditions.    Return in about 9 weeks (around 5/7/2024) for Toenail Care., or sooner if acute issues arise.    This document has been electronically signed by Jim Nj DPM on March 5, 2024 08:46 EST

## 2024-04-18 ENCOUNTER — TELEPHONE (OUTPATIENT)
Dept: INTERNAL MEDICINE | Age: 69
End: 2024-04-18
Payer: MEDICARE

## 2024-06-28 ENCOUNTER — LAB (OUTPATIENT)
Dept: LAB | Facility: HOSPITAL | Age: 69
End: 2024-06-28
Payer: MEDICARE

## 2024-06-28 DIAGNOSIS — E78.2 MIXED HYPERLIPIDEMIA: ICD-10-CM

## 2024-06-28 DIAGNOSIS — N18.31 STAGE 3A CHRONIC KIDNEY DISEASE: ICD-10-CM

## 2024-06-28 DIAGNOSIS — E11.9 TYPE 2 DIABETES MELLITUS WITHOUT COMPLICATION, WITHOUT LONG-TERM CURRENT USE OF INSULIN: ICD-10-CM

## 2024-06-28 LAB
ALBUMIN SERPL-MCNC: 4.4 G/DL (ref 3.5–5.2)
ALBUMIN/GLOB SERPL: 1.8 G/DL
ALP SERPL-CCNC: 60 U/L (ref 39–117)
ALT SERPL W P-5'-P-CCNC: 20 U/L (ref 1–33)
ANION GAP SERPL CALCULATED.3IONS-SCNC: 10 MMOL/L (ref 5–15)
AST SERPL-CCNC: 23 U/L (ref 1–32)
BILIRUB SERPL-MCNC: 0.5 MG/DL (ref 0–1.2)
BUN SERPL-MCNC: 11 MG/DL (ref 8–23)
BUN/CREAT SERPL: 10.6 (ref 7–25)
CALCIUM SPEC-SCNC: 9.3 MG/DL (ref 8.6–10.5)
CHLORIDE SERPL-SCNC: 100 MMOL/L (ref 98–107)
CHOLEST SERPL-MCNC: 129 MG/DL (ref 0–200)
CO2 SERPL-SCNC: 28 MMOL/L (ref 22–29)
CREAT SERPL-MCNC: 1.04 MG/DL (ref 0.57–1)
EGFRCR SERPLBLD CKD-EPI 2021: 58.7 ML/MIN/1.73
GLOBULIN UR ELPH-MCNC: 2.5 GM/DL
GLUCOSE SERPL-MCNC: 102 MG/DL (ref 65–99)
HBA1C MFR BLD: 6.2 % (ref 4.8–5.6)
HDLC SERPL-MCNC: 53 MG/DL (ref 40–60)
LDLC SERPL CALC-MCNC: 49 MG/DL (ref 0–100)
LDLC/HDLC SERPL: 0.82 {RATIO}
POTASSIUM SERPL-SCNC: 4.2 MMOL/L (ref 3.5–5.2)
PROT SERPL-MCNC: 6.9 G/DL (ref 6–8.5)
SODIUM SERPL-SCNC: 138 MMOL/L (ref 136–145)
TRIGL SERPL-MCNC: 163 MG/DL (ref 0–150)
VLDLC SERPL-MCNC: 27 MG/DL (ref 5–40)

## 2024-06-28 PROCEDURE — 83036 HEMOGLOBIN GLYCOSYLATED A1C: CPT

## 2024-06-28 PROCEDURE — 80053 COMPREHEN METABOLIC PANEL: CPT

## 2024-06-28 PROCEDURE — 80061 LIPID PANEL: CPT

## 2024-06-28 PROCEDURE — 36415 COLL VENOUS BLD VENIPUNCTURE: CPT

## 2024-07-05 ENCOUNTER — OFFICE VISIT (OUTPATIENT)
Dept: INTERNAL MEDICINE | Age: 69
End: 2024-07-05
Payer: MEDICARE

## 2024-07-05 VITALS
BODY MASS INDEX: 35.38 KG/M2 | DIASTOLIC BLOOD PRESSURE: 68 MMHG | TEMPERATURE: 97.3 F | SYSTOLIC BLOOD PRESSURE: 116 MMHG | HEIGHT: 61 IN | HEART RATE: 73 BPM | WEIGHT: 187.4 LBS | OXYGEN SATURATION: 97 %

## 2024-07-05 DIAGNOSIS — E11.9 TYPE 2 DIABETES MELLITUS WITHOUT COMPLICATION, WITHOUT LONG-TERM CURRENT USE OF INSULIN: ICD-10-CM

## 2024-07-05 DIAGNOSIS — I10 ESSENTIAL HYPERTENSION: ICD-10-CM

## 2024-07-05 DIAGNOSIS — E66.01 MORBID OBESITY: ICD-10-CM

## 2024-07-05 DIAGNOSIS — E55.9 VITAMIN D DEFICIENCY: ICD-10-CM

## 2024-07-05 DIAGNOSIS — E78.2 MIXED HYPERLIPIDEMIA: ICD-10-CM

## 2024-07-05 DIAGNOSIS — N18.31 STAGE 3A CHRONIC KIDNEY DISEASE: ICD-10-CM

## 2024-07-05 DIAGNOSIS — Z00.00 MEDICARE ANNUAL WELLNESS VISIT, SUBSEQUENT: Primary | ICD-10-CM

## 2024-07-05 PROBLEM — R21 RASH AND NONSPECIFIC SKIN ERUPTION: Status: RESOLVED | Noted: 2023-07-18 | Resolved: 2024-07-05

## 2024-07-05 PROCEDURE — G0439 PPPS, SUBSEQ VISIT: HCPCS | Performed by: INTERNAL MEDICINE

## 2024-07-05 PROCEDURE — 1126F AMNT PAIN NOTED NONE PRSNT: CPT | Performed by: INTERNAL MEDICINE

## 2024-07-05 PROCEDURE — 1170F FXNL STATUS ASSESSED: CPT | Performed by: INTERNAL MEDICINE

## 2024-07-05 PROCEDURE — 3044F HG A1C LEVEL LT 7.0%: CPT | Performed by: INTERNAL MEDICINE

## 2024-07-05 PROCEDURE — 99214 OFFICE O/P EST MOD 30 MIN: CPT | Performed by: INTERNAL MEDICINE

## 2024-07-05 PROCEDURE — 3074F SYST BP LT 130 MM HG: CPT | Performed by: INTERNAL MEDICINE

## 2024-07-05 PROCEDURE — 3078F DIAST BP <80 MM HG: CPT | Performed by: INTERNAL MEDICINE

## 2024-07-05 PROCEDURE — 1160F RVW MEDS BY RX/DR IN RCRD: CPT | Performed by: INTERNAL MEDICINE

## 2024-07-05 PROCEDURE — 1159F MED LIST DOCD IN RCRD: CPT | Performed by: INTERNAL MEDICINE

## 2024-07-05 RX ORDER — LISINOPRIL 10 MG/1
10 TABLET ORAL DAILY
Qty: 90 TABLET | Refills: 1 | Status: SHIPPED | OUTPATIENT
Start: 2024-07-05

## 2024-07-05 RX ORDER — SIMVASTATIN 40 MG
40 TABLET ORAL DAILY
Qty: 90 TABLET | Refills: 1 | Status: SHIPPED | OUTPATIENT
Start: 2024-07-05

## 2024-07-05 RX ORDER — OMEPRAZOLE 20 MG/1
20 CAPSULE, DELAYED RELEASE ORAL DAILY
Qty: 90 CAPSULE | Refills: 1 | Status: SHIPPED | OUTPATIENT
Start: 2024-07-05

## 2024-07-05 RX ORDER — ATENOLOL 100 MG/1
100 TABLET ORAL DAILY
Qty: 90 TABLET | Refills: 1 | Status: SHIPPED | OUTPATIENT
Start: 2024-07-05

## 2024-07-05 NOTE — PROGRESS NOTES
"Chief Complaint  Diabetes and Follow-up (Pt states that this is routine, she had labs, she has no new issues. )    Subjective          Ashwini Lopez presents to [facility]     History of Present Illness:  Patient is a 68-year-old female with underlying diabetes mellitus on oral agents, hypertension, hyperlipidemia, among others, who is here 7/24 for routine 3-4-month diabetic follow-up.  We will review her med list, go over her labs in detail, and address any new concerns she may have.    Review of Systems   Constitutional:  Negative for appetite change, fatigue, fever and unexpected weight loss.   HENT:  Negative for congestion, ear pain, rhinorrhea and sore throat.    Eyes:  Negative for blurred vision and pain.   Respiratory:  Negative for cough, chest tightness, shortness of breath and wheezing.    Cardiovascular:  Negative for chest pain, palpitations and leg swelling.   Gastrointestinal:  Negative for abdominal pain, diarrhea and vomiting.   Endocrine: Negative for polydipsia, polyphagia and polyuria.   Genitourinary:  Negative for difficulty urinating, dysuria and hematuria.   Musculoskeletal:  Negative for arthralgias and gait problem.   Skin:  Positive for rash. Negative for pallor and skin lesions.   Neurological:  Negative for dizziness, tremors, seizures, syncope, speech difficulty, weakness, memory problem and confusion.   Psychiatric/Behavioral:  Negative for self-injury and depressed mood. The patient is not nervous/anxious.        Objective   Vital Signs:   /68   Pulse 73   Temp 97.3 °F (36.3 °C) (Skin)   Ht 154.9 cm (60.98\")   Wt 85 kg (187 lb 6.4 oz)   SpO2 97%   BMI 35.43 kg/m²           Physical Exam  Vitals and nursing note reviewed.   Constitutional:       General: She is not in acute distress.     Appearance: Normal appearance. She is not toxic-appearing.   HENT:      Head: Atraumatic.      Right Ear: External ear normal.      Left Ear: External " ear normal.      Nose: Nose normal.      Mouth/Throat:      Mouth: Mucous membranes are moist.   Eyes:      General:         Right eye: No discharge.         Left eye: No discharge.      Extraocular Movements: Extraocular movements intact.      Pupils: Pupils are equal, round, and reactive to light.   Cardiovascular:      Rate and Rhythm: Normal rate and regular rhythm.      Pulses: Normal pulses.      Heart sounds: Normal heart sounds. No murmur heard.     No gallop.   Pulmonary:      Effort: Pulmonary effort is normal. No respiratory distress.      Breath sounds: No wheezing, rhonchi or rales.   Abdominal:      General: There is no distension.      Palpations: Abdomen is soft. There is no mass.      Tenderness: There is no abdominal tenderness. There is no guarding.   Musculoskeletal:         General: No swelling or tenderness.      Cervical back: No tenderness.      Right lower leg: No edema.      Left lower leg: No edema.   Skin:     General: Skin is warm and dry.      Findings: No rash.   Neurological:      General: No focal deficit present.      Mental Status: She is alert and oriented to person, place, and time. Mental status is at baseline.      Motor: No weakness.      Gait: Gait normal.   Psychiatric:         Mood and Affect: Mood normal.         Thought Content: Thought content normal.          Result Review :   The following data was reviewed by: Savage Watts MD on 07/13/2021:                  Assessment and Plan    Diagnoses and all orders for this visit:    1. Medicare annual wellness visit, subsequent (Primary)  Overview:  AWV completed 7/24.  Patient remains active and independent.  No falls and no hospitalizations past year.  She does have a living will and she will get us a copy here at her next appointment    Orders:  -     Vitamin B12 anemia; Future  -     Folate anemia; Future  -     TSH; Future  -     Iron Profile; Future  -     Ferritin; Future    2. Type 2 diabetes mellitus without  complication, without long-term current use of insulin  Overview:  Previously on glipizide and Actos.    Assessment & Plan:  A1c had trended up slightly from 6.6 to 7.0 over the holidays, she is now down to 6.2 as of her 7/24 OV, stable to continue with full doses of semaglutide, metformin, and Jardiance.  Certainly no changes to regimen indicated at this time, discussed with patient she could drop off of one of the metformin if she had some low sugars at home.    Orders:  -     Hemoglobin A1c; Future    3. Stage 3a chronic kidney disease  Overview:  Teto was 52, creatinine 1.15.    Assessment & Plan:  GFR staying fairly stable now as of her 7/24 OV, bouncing between 55 and 65, no electrolyte abnormalities, already on ACE inhibitor and Jardiance, monitor with routine labs.    Orders:  -     CBC & Differential; Future    4. Mixed hyperlipidemia  Assessment & Plan:   LDL is lower still at 49 as of 7/24 OV without any changes, certainly stable to continue with just moderate dose simvastatin.      5. Essential hypertension  Assessment & Plan:  Blood pressure is well-controlled as of her 7/24 OV.  Her pulse is in mid 70s.  She is stable to continue with full dose atenolol and just low-dose lisinopril.    Orders:  -     Comprehensive Metabolic Panel; Future    6. Vitamin D deficiency  -     Vitamin D,25-Hydroxy; Future    7. Morbid obesity  Assessment & Plan:  BMI is  down from 48 in 3/22 to 46 as of 9/22...BMI down further to 43.5 as of 9/23.---> BMD down further to 40.3 as of 12/23---> down further to 35.4 as of 7/24, weight down further as well from 252 initially, to 207 4 months ago, and now to 187 as of her 7/24 OV.    Patient is continuing with conservative dietary restrictions and activity as tolerated.      Additionally she is tolerating the 2 mg dose of Ozempic which is benefiting her diabetes as well.  Continue current plan of care.        Other orders  -     atenolol (TENORMIN) 100 MG tablet; Take 1 tablet by  "mouth Daily.  Dispense: 90 tablet; Refill: 1  -     empagliflozin (Jardiance) 25 MG tablet tablet; Take 1 tablet by mouth Daily.  Dispense: 90 tablet; Refill: 1  -     lisinopril (PRINIVIL,ZESTRIL) 10 MG tablet; Take 1 tablet by mouth Daily.  Dispense: 90 tablet; Refill: 1  -     metFORMIN (GLUCOPHAGE) 1000 MG tablet; Take 1 tablet by mouth 2 (Two) Times a Day.  Dispense: 180 tablet; Refill: 1  -     omeprazole (priLOSEC) 20 MG capsule; Take 1 capsule by mouth Daily.  Dispense: 90 capsule; Refill: 1  -     Semaglutide, 2 MG/DOSE, (OZEMPIC) 8 MG/3ML solution pen-injector; Inject 2 mg under the skin into the appropriate area as directed 1 (One) Time Per Week.  Dispense: 9 mL; Refill: 1  -     simvastatin (ZOCOR) 40 MG tablet; Take 1 tablet by mouth Daily.  Dispense: 90 tablet; Refill: 1             Class 3 Severe Obesity (BMI >=40). Obesity-related health conditions include the following: hypertension, diabetes mellitus, dyslipidemias, GERD, and osteoarthritis. Obesity is worsening. BMI is is above average; BMI management plan is completed. We discussed portion control and increasing exercise.     --  --  OLDER NOTES:  VISIT 3/21:  ANNUAL PHYSICAL 2/20:  --  DM with A1C 7.8 on less than prescribed meds b/c was running low; d/w wt loss is goal here and call if low spells; to [place] as well to help with diet (micro-alb neg 1/17)...7.3 is good, but goal is 6.8 given age...7.4 \" b/c of my foot and I couldn't exercise\"; will see what Jardiance will cost and try to wean glipizide...6.8 is good drop and is down to 2/1 of glipizide, so increase jardiance now and try 1/1 or 2/0 if drops again...7.7 despite increased Jardaince, so needs 2/1 again and/or get some wt off; agree with new monitor as well...7.2 and rec stay on this dose and diet please (d/w reason for wanting to get off glipizide)...7.4...is up due to being off feet for 6 weeks=8.0, so will try 45 mg actos if no worse swelling...7.1 and d/w yes, she can play with " glipizide since having lows...7.3 in 6/20 is fine since only 1/2 dose glipizide now=ditto 9/20 = 7.2---> 7.2 is steady 3/21. (TSH neg 9/20).  OPTHO neg 2/21; MICROALB=  --  HTN remains well controlled. 3/21.  ? RRA0q=81% in 3/21 = no concerns yet.  --  LIPIDS at goal with LDL 69...67---> 88 in 9/20.  SPECT 7/16 with EF 50% and no ischemia (FH CHF=B MI at age 41).  --  FE DEF ANEMIA is up 11.5 to 12.4 and d/w stay on same MVI as of 5/17 OV...13.1 with lowish iron, stay on MVI...stable=defer a while after 2/19 OV---> all labs stable as of 9/20 = stay on MVI.  --  ELEVATED LFTS=wnl as of 1/17... ditto.  GERD w/o dysphagia on PPI and I rec trial of qod; (s/p prior dilation)  --  VIT D DEF=fine 9/20.  BMD neg per Dr Cuellar age 61 and she is scheduled for one soon as of her 10/21 office visit.  --  S/P FALL 10/17 = fell off stool, to [facility], had HCT, balance and paresthesia's since; exam non-focal, neg FTN, neg rhomberg; will get dopplers and review the cat scan, but o/w no tx rec...Dopplers 2/18 were fine; will send to ENT due to sxs with turning head...?  S/P FALL 7/19 = RLE major swelling, had VELE=neg; saw Ortho and they sent to PT...fell again, had MRI=R tibeal plateau fx and was in brace for 6 weeks per Dr Krishnan; has f/u with him before PT to resume; I d/w try replace aleve with tylenol arthritis.  --  MORBID [id number] is stuck (TSH neg 2/20).  --  --  MMG 1/17/2024 per Dr Mac's NP.  COLON 1/22 = polyp x1 = 5 years per Dr. Muir.  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    Follow Up   Return in about 4 months (around 11/5/2024).  Patient was given instructions and counseling regarding her condition or for health maintenance advice. Please see specific information pulled into the AVS if appropriate.     Answers submitted by the patient for this visit:  Primary Reason for Visit (Submitted on  7/1/2024)  What is the primary reason for your visit?: Diabetes  Diabetes Questionnaire (Submitted on 7/1/2024)  Chief Complaint: Diabetes problem  Diabetes type: type 2  MedicAlert ID: No  Disease duration: 23 Years  Treatment compliance: all of the time  Symptom course: improving  Home blood tests: 1-2 x per day  High score: 140-180  Below 70: never  foot paresthesias: No  foot ulcerations: No  blackouts: No  hospitalization: No  nocturnal hypoglycemia: No  required assistance: No  required glucagon: No  headaches: No  sweats: Yes  Current diet: generally healthy  Meal planning: carbohydrate counting, calorie counting  Exercise: intermittently  Eye exam current: Yes  Sees podiatrist: Yes

## 2024-07-05 NOTE — PROGRESS NOTES
The ABCs of the Annual Wellness Visit  Subsequent Medicare Wellness Visit    Subjective      Ashwini Lopez is a 69 y.o. female who presents for a Subsequent Medicare Wellness Visit.    The following portions of the patient's history were reviewed and   updated as appropriate: allergies, current medications, past family history, past medical history, past social history, past surgical history, and problem list.    Compared to one year ago, the patient feels her physical   health is better.---> With significant weight loss here over the past year or so.    Compared to one year ago, the patient feels her mental   health is the same.    Recent Hospitalizations:  She was not admitted to the hospital during the last year.       Current Medical Providers:  Patient Care Team:  Savage Watts MD as PCP - General (Internal Medicine)  Jim Nj DPM as Consulting Physician (Podiatry)  Jeremiah Krishnan MD as Consulting Physician (Orthopedic Surgery)    Outpatient Medications Prior to Visit   Medication Sig Dispense Refill    acetaminophen (TYLENOL) 650 MG 8 hr tablet 1 tablet Every 8 (Eight) Hours As Needed.      aspirin 81 MG EC tablet Take 1 tablet by mouth Daily.      calcium carb-cholecalciferol 600-800 MG-UNIT tablet 1 tablet.      Cetirizine HCl (ZyrTEC Allergy) 10 MG capsule Take 1 tablet by mouth Daily.      EPINEPHrine (EPIPEN) 0.3 MG/0.3ML solution auto-injector injection INJECT 0.3 ML INTO THE APPROPRIATE MUSCLE AS DIRECTED BY PRESCRIBER 1 (ONE) TIME FOR 1 DOSE.      Multiple Vitamins-Iron (multivitamin with iron) tablet tablet 1 tablet.      Multiple Vitamins-Minerals (PRESERVISION AREDS 2 PO) 1 tablet.      atenolol (TENORMIN) 100 MG tablet Take 1 tablet by mouth Daily. 90 tablet 1    empagliflozin (Jardiance) 25 MG tablet tablet Take 1 tablet by mouth Daily. 90 tablet 1    lisinopril (PRINIVIL,ZESTRIL) 10 MG tablet Take 1 tablet by mouth Daily. 90 tablet 1    metFORMIN (GLUCOPHAGE) 1000 MG tablet  "Take 1 tablet by mouth 2 (Two) Times a Day. 180 tablet 1    omeprazole (priLOSEC) 20 MG capsule Take 1 capsule by mouth Daily. 90 capsule 1    Semaglutide, 2 MG/DOSE, (OZEMPIC) 8 MG/3ML solution pen-injector Inject 2 mg under the skin into the appropriate area as directed 1 (One) Time Per Week. 9 mL 1    simvastatin (ZOCOR) 40 MG tablet Take 1 tablet by mouth Daily. 90 tablet 1     No facility-administered medications prior to visit.       No opioid medication identified on active medication list. I have reviewed chart for other potential  high risk medication/s and harmful drug interactions in the elderly.        Aspirin is on active medication list. Aspirin use is indicated based on review of current medical condition/s. Pros and cons of this therapy have been discussed today. Benefits of this medication outweigh potential harm.  Patient has been encouraged to continue taking this medication.  .      Patient Active Problem List   Diagnosis    Essential hypertension    Mixed hyperlipidemia    Morbid obesity    Vitamin D deficiency    Type 2 diabetes mellitus without complication, without long-term current use of insulin    Gastroesophageal reflux disease without esophagitis    Stage 3a chronic kidney disease    Adenomatous polyp of transverse colon    Medicare annual wellness visit, subsequent     Advance Care Planning   Advance Care Planning     Advance Directive is not on file.  ACP discussion was held with the patient during this visit. Patient has an advance directive (not in EMR), copy requested.     Objective    Vitals:    07/05/24 0838   BP: 116/68   Pulse: 73   Temp: 97.3 °F (36.3 °C)   TempSrc: Skin   SpO2: 97%   Weight: 85 kg (187 lb 6.4 oz)   Height: 154.9 cm (60.98\")     Estimated body mass index is 35.43 kg/m² as calculated from the following:    Height as of this encounter: 154.9 cm (60.98\").    Weight as of this encounter: 85 kg (187 lb 6.4 oz).           Does the patient have evidence of cognitive " impairment?   No    Lab Results   Component Value Date    TRIG 163 (H) 2024    HDL 53 2024    LDL 49 2024    VLDL 27 2024    HGBA1C 6.20 (H) 2024          HEALTH RISK ASSESSMENT    Smoking Status:  Social History     Tobacco Use   Smoking Status Never   Smokeless Tobacco Never     Alcohol Consumption:  Social History     Substance and Sexual Activity   Alcohol Use Not Currently     Fall Risk Screen:    STEADI Fall Risk Assessment was completed, and patient is at LOW risk for falls.Assessment completed on:2024    Depression Screenin/5/2024     8:37 AM   PHQ-2/PHQ-9 Depression Screening   Little Interest or Pleasure in Doing Things 0-->not at all   Feeling Down, Depressed or Hopeless 0-->not at all   PHQ-9: Brief Depression Severity Measure Score 0       Health Habits and Functional and Cognitive Screenin/5/2024     8:00 AM   Functional & Cognitive Status   Do you have difficulty preparing food and eating? No   Do you have difficulty bathing yourself, getting dressed or grooming yourself? No   Do you have difficulty using the toilet? No   Do you have difficulty moving around from place to place? No   Do you have trouble with steps or getting out of a bed or a chair? No   Current Diet Well Balanced Diet   Dental Exam Up to date   Eye Exam Up to date   Exercise (times per week) 2 times per week   Current Exercises Include Walking   Do you need help using the phone?  No   Are you deaf or do you have serious difficulty hearing?  No   Do you need help to go to places out of walking distance? No   Do you need help shopping? No   Do you need help preparing meals?  No   Do you need help with housework?  No   Do you need help with laundry? No   Do you need help taking your medications? No   Do you need help managing money? No   Do you ever drive or ride in a car without wearing a seat belt? No   Have you felt unusual stress, anger or loneliness in the last month? No   Who do  you live with? Spouse   If you need help, do you have trouble finding someone available to you? No   Have you been bothered in the last four weeks by sexual problems? No   Do you have difficulty concentrating, remembering or making decisions? No       Age-appropriate Screening Schedule:  Refer to the list below for future screening recommendations based on patient's age, sex and/or medical conditions. Orders for these recommended tests are listed in the plan section. The patient has been provided with a written plan.    Health Maintenance   Topic Date Due    RSV Vaccine - Adults (1 - 1-dose 60+ series) Never done    ANNUAL WELLNESS VISIT  06/06/2024    INFLUENZA VACCINE  08/01/2024    URINE MICROALBUMIN  11/30/2024    HEMOGLOBIN A1C  12/28/2024    DIABETIC EYE EXAM  03/04/2025    DIABETIC FOOT EXAM  03/05/2025    LIPID PANEL  06/28/2025    BMI FOLLOWUP  07/05/2025    DXA SCAN  01/03/2026    MAMMOGRAM  01/17/2026    COLORECTAL CANCER SCREENING  01/21/2027    HEPATITIS C SCREENING  Completed    COVID-19 Vaccine  Completed    Pneumococcal Vaccine 65+  Completed    ZOSTER VACCINE  Completed    TDAP/TD VACCINES  Discontinued                  CMS Preventative Services Quick Reference  Risk Factors Identified During Encounter:    None Identified    The above risks/problems have been discussed with the patient.  Pertinent information has been shared with the patient in the After Visit Summary.    Diagnoses and all orders for this visit:    1. Medicare annual wellness visit, subsequent (Primary)  -     Vitamin B12 anemia; Future  -     Folate anemia; Future  -     TSH; Future  -     Iron Profile; Future  -     Ferritin; Future    2. Type 2 diabetes mellitus without complication, without long-term current use of insulin  Assessment & Plan:  A1c had trended up slightly from 6.6 to 7.0 over the holidays, she is now down to 6.2 as of her 7/24 OV, stable to continue with full doses of semaglutide, metformin, and Jardiance.   Certainly no changes to regimen indicated at this time, discussed with patient she could drop off of one of the metformin if she had some low sugars at home.    Orders:  -     Hemoglobin A1c; Future    3. Stage 3a chronic kidney disease  Assessment & Plan:  GFR staying fairly stable now as of her 7/24 OV, bouncing between 55 and 65, no electrolyte abnormalities, already on ACE inhibitor and Jardiance, monitor with routine labs.    Orders:  -     CBC & Differential; Future    4. Mixed hyperlipidemia  Assessment & Plan:   LDL is lower still at 49 as of 7/24 OV without any changes, certainly stable to continue with just moderate dose simvastatin.      5. Essential hypertension  Assessment & Plan:  Blood pressure is well-controlled as of her 7/24 OV.  Her pulse is in mid 70s.  She is stable to continue with full dose atenolol and just low-dose lisinopril.    Orders:  -     Comprehensive Metabolic Panel; Future    6. Vitamin D deficiency  -     Vitamin D,25-Hydroxy; Future    7. Morbid obesity  Assessment & Plan:  BMI is  down from 48 in 3/22 to 46 as of 9/22...BMI down further to 43.5 as of 9/23.---> BMD down further to 40.3 as of 12/23---> down further to 35.4 as of 7/24, weight down further as well from 252 initially, to 207 4 months ago, and now to 187 as of her 7/24 OV.    Patient is continuing with conservative dietary restrictions and activity as tolerated.      Additionally she is tolerating the 2 mg dose of Ozempic which is benefiting her diabetes as well.  Continue current plan of care.        Other orders  -     atenolol (TENORMIN) 100 MG tablet; Take 1 tablet by mouth Daily.  Dispense: 90 tablet; Refill: 1  -     empagliflozin (Jardiance) 25 MG tablet tablet; Take 1 tablet by mouth Daily.  Dispense: 90 tablet; Refill: 1  -     lisinopril (PRINIVIL,ZESTRIL) 10 MG tablet; Take 1 tablet by mouth Daily.  Dispense: 90 tablet; Refill: 1  -     metFORMIN (GLUCOPHAGE) 1000 MG tablet; Take 1 tablet by mouth 2 (Two)  Times a Day.  Dispense: 180 tablet; Refill: 1  -     omeprazole (priLOSEC) 20 MG capsule; Take 1 capsule by mouth Daily.  Dispense: 90 capsule; Refill: 1  -     Semaglutide, 2 MG/DOSE, (OZEMPIC) 8 MG/3ML solution pen-injector; Inject 2 mg under the skin into the appropriate area as directed 1 (One) Time Per Week.  Dispense: 9 mL; Refill: 1  -     simvastatin (ZOCOR) 40 MG tablet; Take 1 tablet by mouth Daily.  Dispense: 90 tablet; Refill: 1        Follow Up:   Next Medicare Wellness visit to be scheduled in 1 year.      An After Visit Summary and PPPS were made available to the patient.

## 2024-07-05 NOTE — ASSESSMENT & PLAN NOTE
A1c had trended up slightly from 6.6 to 7.0 over the holidays, she is now down to 6.2 as of her 7/24 OV, stable to continue with full doses of semaglutide, metformin, and Jardiance.  Certainly no changes to regimen indicated at this time, discussed with patient she could drop off of one of the metformin if she had some low sugars at home.

## 2024-07-05 NOTE — ASSESSMENT & PLAN NOTE
LDL is lower still at 49 as of 7/24 OV without any changes, certainly stable to continue with just moderate dose simvastatin.

## 2024-07-05 NOTE — ASSESSMENT & PLAN NOTE
Blood pressure is well-controlled as of her 7/24 OV.  Her pulse is in mid 70s.  She is stable to continue with full dose atenolol and just low-dose lisinopril.

## 2024-07-05 NOTE — ASSESSMENT & PLAN NOTE
BMI is  down from 48 in 3/22 to 46 as of 9/22...BMI down further to 43.5 as of 9/23.---> BMD down further to 40.3 as of 12/23---> down further to 35.4 as of 7/24, weight down further as well from 252 initially, to 207 4 months ago, and now to 187 as of her 7/24 OV.    Patient is continuing with conservative dietary restrictions and activity as tolerated.      Additionally she is tolerating the 2 mg dose of Ozempic which is benefiting her diabetes as well.  Continue current plan of care.

## 2024-07-05 NOTE — ASSESSMENT & PLAN NOTE
GFR staying fairly stable now as of her 7/24 OV, bouncing between 55 and 65, no electrolyte abnormalities, already on ACE inhibitor and Jardiance, monitor with routine labs.

## 2024-07-29 ENCOUNTER — OFFICE VISIT (OUTPATIENT)
Dept: OBSTETRICS AND GYNECOLOGY | Facility: CLINIC | Age: 69
End: 2024-07-29
Payer: MEDICARE

## 2024-07-29 VITALS
BODY MASS INDEX: 35.27 KG/M2 | WEIGHT: 186.8 LBS | SYSTOLIC BLOOD PRESSURE: 113 MMHG | DIASTOLIC BLOOD PRESSURE: 72 MMHG | HEART RATE: 83 BPM | HEIGHT: 61 IN

## 2024-07-29 DIAGNOSIS — Z01.419 ENCOUNTER FOR GYNECOLOGICAL EXAMINATION WITHOUT ABNORMAL FINDING: Primary | ICD-10-CM

## 2024-07-29 NOTE — PROGRESS NOTES
"Well Woman Visit    CC: Annual well woman exam       HPI:   69 y.o. Contraception or HRT: Post menopausal  Menses:  none   Pain:  None  Incontinence concerns: No  Hx of abnormal pap:  No  Pt has no complaints today.      History: PMHx, Meds, Allergies, PSHx, Social Hx, and POBHx all reviewed and updated.      PHYSICAL EXAM:  /72   Pulse 83   Ht 154.9 cm (60.98\")   Wt 84.7 kg (186 lb 12.8 oz)   BMI 35.32 kg/m²   General- NAD, alert and oriented, appropriate  Psych- Normal mood, good memory  Neck- No masses, no thyroid enlargement  CV- Regular rhythm, no murnurs  Resp- CTA to bases, no wheezes  Abdomen- Soft, non distended, non tender, no masses    Breast left-  Bilaterally symmetrical, no masses, non tender, no nipple discharge  Breast right- Bilaterally symmetrical, no masses, non tender, no nipple discharge    External genitalia- Normal female, no lesions  Urethra/meatus- Normal, no masses, non tender, no prolapse  Bladder- Normal, no masses, non tender, no prolapse  Vagina- + atrophy, no lesions, no discharge, no prolapse  Cvx- Normal, no lesions, no discharge, No cervical motion tenderness, stenotic cervix  Uterus- Normal size, shape & consistency.  Non tender, mobile.  Adnexa- No mass, non tender  Anus/Rectum/Perineum- Normal appearance, no mass, good sphincter tone, no hemorrhoids, no prolapse , Hemoccult neg    Lymphatic- No palpable neck, axillary, or groin nodes  Ext- No edema, no cyanosis    Skin- No lesions, no rashes, no acanthosis nigricans        ASSESSMENT and PLAN:  WWE    Diagnoses and all orders for this visit:    1. Encounter for gynecological examination without abnormal finding (Primary)  -     POC Occult Blood Stool        Domestic violence/abuse screen: negative    Depression screen: no SI    Preventative:   BREAST HEALTH- Monthly self breast exam importance and how to reviewed. MMG and/or MRI (prn) reviewed per society guidelines and her individual history. Mammo/MRI screen: " Already up to date.  CERVICAL CANCER Screening- Reviewed current ASCCP guidelines for screening w and wo cotest HPV, age specific.  Screen: Not medically needed.  COLON CANCER Screening- Reviewed current medical society guidelines and options.  Colonoscopy screen:  Already up to date.  SEXUAL HEALTH: Declines STD screening.  BONE HEALTH- Reviewed current medical society guidelines and options for testing, calcium and vit D intake.  Weight bearing exercise.  DEXA: Already up to date.  VACCINATIONS Recommended: Flu annually, Zoster (51yo and older), Pneumococcal (64yo and older).  Importance discussed, risk being unvaccinated reviewed.  Questions answered  Smoking status- NON SMOKER.  Importance of avoiding second hand smoke.  Follow up PCP/Specialist PMHx and Labs  [facility] : does not qualify      She understands the importance of having any ordered tests to be performed in a timely fashion.  She is encouraged to review her results online and/or contact or office if she has questions.     Follow Up:  Return in about 1 year (around 7/29/2025) for Annual physical.      Mlaa Samano, TAMERA  07/29/2024

## 2024-10-30 ENCOUNTER — LAB (OUTPATIENT)
Dept: LAB | Facility: HOSPITAL | Age: 69
End: 2024-10-30
Payer: MEDICARE

## 2024-10-30 DIAGNOSIS — I10 ESSENTIAL HYPERTENSION: ICD-10-CM

## 2024-10-30 DIAGNOSIS — E11.9 TYPE 2 DIABETES MELLITUS WITHOUT COMPLICATION, WITHOUT LONG-TERM CURRENT USE OF INSULIN: ICD-10-CM

## 2024-10-30 DIAGNOSIS — Z00.00 MEDICARE ANNUAL WELLNESS VISIT, SUBSEQUENT: ICD-10-CM

## 2024-10-30 DIAGNOSIS — E55.9 VITAMIN D DEFICIENCY: ICD-10-CM

## 2024-10-30 DIAGNOSIS — N18.31 STAGE 3A CHRONIC KIDNEY DISEASE: ICD-10-CM

## 2024-10-30 LAB
25(OH)D3 SERPL-MCNC: 70.6 NG/ML (ref 30–100)
ALBUMIN SERPL-MCNC: 4.5 G/DL (ref 3.5–5.2)
ALBUMIN/GLOB SERPL: 1.6 G/DL
ALP SERPL-CCNC: 72 U/L (ref 39–117)
ALT SERPL W P-5'-P-CCNC: 17 U/L (ref 1–33)
ANION GAP SERPL CALCULATED.3IONS-SCNC: 12.9 MMOL/L (ref 5–15)
AST SERPL-CCNC: 22 U/L (ref 1–32)
BASOPHILS # BLD AUTO: 0.07 10*3/MM3 (ref 0–0.2)
BASOPHILS NFR BLD AUTO: 0.9 % (ref 0–1.5)
BILIRUB SERPL-MCNC: 0.5 MG/DL (ref 0–1.2)
BUN SERPL-MCNC: 14 MG/DL (ref 8–23)
BUN/CREAT SERPL: 14.4 (ref 7–25)
CALCIUM SPEC-SCNC: 9.6 MG/DL (ref 8.6–10.5)
CHLORIDE SERPL-SCNC: 97 MMOL/L (ref 98–107)
CO2 SERPL-SCNC: 26.1 MMOL/L (ref 22–29)
CREAT SERPL-MCNC: 0.97 MG/DL (ref 0.57–1)
DEPRECATED RDW RBC AUTO: 37.2 FL (ref 37–54)
EGFRCR SERPLBLD CKD-EPI 2021: 63.8 ML/MIN/1.73
EOSINOPHIL # BLD AUTO: 0.2 10*3/MM3 (ref 0–0.4)
EOSINOPHIL NFR BLD AUTO: 2.5 % (ref 0.3–6.2)
ERYTHROCYTE [DISTWIDTH] IN BLOOD BY AUTOMATED COUNT: 11.6 % (ref 12.3–15.4)
FERRITIN SERPL-MCNC: 143 NG/ML (ref 13–150)
FOLATE SERPL-MCNC: >20 NG/ML (ref 4.78–24.2)
GLOBULIN UR ELPH-MCNC: 2.8 GM/DL
GLUCOSE SERPL-MCNC: 130 MG/DL (ref 65–99)
HBA1C MFR BLD: 6.2 % (ref 4.8–5.6)
HCT VFR BLD AUTO: 45.4 % (ref 34–46.6)
HGB BLD-MCNC: 14.2 G/DL (ref 12–15.9)
IMM GRANULOCYTES # BLD AUTO: 0.02 10*3/MM3 (ref 0–0.05)
IMM GRANULOCYTES NFR BLD AUTO: 0.2 % (ref 0–0.5)
IRON 24H UR-MRATE: 98 MCG/DL (ref 37–145)
IRON SATN MFR SERPL: 22 % (ref 20–50)
LYMPHOCYTES # BLD AUTO: 3.27 10*3/MM3 (ref 0.7–3.1)
LYMPHOCYTES NFR BLD AUTO: 40.3 % (ref 19.6–45.3)
MCH RBC QN AUTO: 27.8 PG (ref 26.6–33)
MCHC RBC AUTO-ENTMCNC: 31.3 G/DL (ref 31.5–35.7)
MCV RBC AUTO: 88.8 FL (ref 79–97)
MONOCYTES # BLD AUTO: 0.8 10*3/MM3 (ref 0.1–0.9)
MONOCYTES NFR BLD AUTO: 9.9 % (ref 5–12)
NEUTROPHILS NFR BLD AUTO: 3.76 10*3/MM3 (ref 1.7–7)
NEUTROPHILS NFR BLD AUTO: 46.2 % (ref 42.7–76)
NRBC BLD AUTO-RTO: 0 /100 WBC (ref 0–0.2)
PLATELET # BLD AUTO: 338 10*3/MM3 (ref 140–450)
PMV BLD AUTO: 10.8 FL (ref 6–12)
POTASSIUM SERPL-SCNC: 4 MMOL/L (ref 3.5–5.2)
PROT SERPL-MCNC: 7.3 G/DL (ref 6–8.5)
RBC # BLD AUTO: 5.11 10*6/MM3 (ref 3.77–5.28)
SODIUM SERPL-SCNC: 136 MMOL/L (ref 136–145)
TIBC SERPL-MCNC: 453 MCG/DL (ref 298–536)
TRANSFERRIN SERPL-MCNC: 304 MG/DL (ref 200–360)
TSH SERPL DL<=0.05 MIU/L-ACNC: 1.69 UIU/ML (ref 0.27–4.2)
VIT B12 BLD-MCNC: 247 PG/ML (ref 211–946)
WBC NRBC COR # BLD AUTO: 8.12 10*3/MM3 (ref 3.4–10.8)

## 2024-10-30 PROCEDURE — 80053 COMPREHEN METABOLIC PANEL: CPT

## 2024-10-30 PROCEDURE — 82607 VITAMIN B-12: CPT

## 2024-10-30 PROCEDURE — 82728 ASSAY OF FERRITIN: CPT

## 2024-10-30 PROCEDURE — 84443 ASSAY THYROID STIM HORMONE: CPT

## 2024-10-30 PROCEDURE — 36415 COLL VENOUS BLD VENIPUNCTURE: CPT

## 2024-10-30 PROCEDURE — 82746 ASSAY OF FOLIC ACID SERUM: CPT

## 2024-10-30 PROCEDURE — 84466 ASSAY OF TRANSFERRIN: CPT

## 2024-10-30 PROCEDURE — 83036 HEMOGLOBIN GLYCOSYLATED A1C: CPT

## 2024-10-30 PROCEDURE — 82306 VITAMIN D 25 HYDROXY: CPT

## 2024-10-30 PROCEDURE — 85025 COMPLETE CBC W/AUTO DIFF WBC: CPT

## 2024-10-30 PROCEDURE — 83540 ASSAY OF IRON: CPT

## 2024-11-05 ENCOUNTER — OFFICE VISIT (OUTPATIENT)
Dept: INTERNAL MEDICINE | Age: 69
End: 2024-11-05
Payer: MEDICARE

## 2024-11-05 VITALS
HEART RATE: 65 BPM | HEIGHT: 61 IN | BODY MASS INDEX: 33.68 KG/M2 | WEIGHT: 178.4 LBS | OXYGEN SATURATION: 95 % | TEMPERATURE: 97.1 F | SYSTOLIC BLOOD PRESSURE: 110 MMHG | DIASTOLIC BLOOD PRESSURE: 60 MMHG

## 2024-11-05 DIAGNOSIS — N18.31 STAGE 3A CHRONIC KIDNEY DISEASE: ICD-10-CM

## 2024-11-05 DIAGNOSIS — I10 ESSENTIAL HYPERTENSION: ICD-10-CM

## 2024-11-05 DIAGNOSIS — E78.2 MIXED HYPERLIPIDEMIA: ICD-10-CM

## 2024-11-05 DIAGNOSIS — E11.9 TYPE 2 DIABETES MELLITUS WITHOUT COMPLICATION, WITHOUT LONG-TERM CURRENT USE OF INSULIN: Primary | ICD-10-CM

## 2024-11-05 DIAGNOSIS — K21.9 GASTROESOPHAGEAL REFLUX DISEASE WITHOUT ESOPHAGITIS: ICD-10-CM

## 2024-11-05 DIAGNOSIS — E55.9 VITAMIN D DEFICIENCY: ICD-10-CM

## 2024-11-05 PROCEDURE — 1126F AMNT PAIN NOTED NONE PRSNT: CPT | Performed by: INTERNAL MEDICINE

## 2024-11-05 PROCEDURE — 3078F DIAST BP <80 MM HG: CPT | Performed by: INTERNAL MEDICINE

## 2024-11-05 PROCEDURE — 1160F RVW MEDS BY RX/DR IN RCRD: CPT | Performed by: INTERNAL MEDICINE

## 2024-11-05 PROCEDURE — 99214 OFFICE O/P EST MOD 30 MIN: CPT | Performed by: INTERNAL MEDICINE

## 2024-11-05 PROCEDURE — 3074F SYST BP LT 130 MM HG: CPT | Performed by: INTERNAL MEDICINE

## 2024-11-05 PROCEDURE — 1159F MED LIST DOCD IN RCRD: CPT | Performed by: INTERNAL MEDICINE

## 2024-11-05 PROCEDURE — G2211 COMPLEX E/M VISIT ADD ON: HCPCS | Performed by: INTERNAL MEDICINE

## 2024-11-05 PROCEDURE — 3044F HG A1C LEVEL LT 7.0%: CPT | Performed by: INTERNAL MEDICINE

## 2024-11-05 NOTE — ASSESSMENT & PLAN NOTE
Vitamin D is up to 70 as of 11/24, she made no changes, she is just getting this through her calcium tablet, stable to continue same.   No

## 2024-11-05 NOTE — PROGRESS NOTES
"Chief Complaint  Diabetes and Follow-up (Pt had labs, she has no new issues. )    Subjective          Ashwini Lopez presents to [facility]     History of Present Illness:  Patient is a 69-year-old female with underlying diabetes mellitus on oral agents, hypertension, hyperlipidemia, among others, who is here 11/24 for routine 4-6-month follow-up.  We will review her med list, go over her labs in detail, and address any new concerns she may have.    Review of Systems   Constitutional:  Negative for appetite change, fatigue, fever and unexpected weight loss.   HENT:  Negative for congestion, ear pain, rhinorrhea and sore throat.    Eyes:  Negative for blurred vision and pain.   Respiratory:  Negative for cough, chest tightness, shortness of breath and wheezing.    Cardiovascular:  Negative for chest pain, palpitations and leg swelling.   Gastrointestinal:  Negative for abdominal pain, diarrhea and vomiting.   Endocrine: Negative for polydipsia, polyphagia and polyuria.   Genitourinary:  Negative for difficulty urinating, dysuria and hematuria.   Musculoskeletal:  Negative for arthralgias and gait problem.   Skin:  Positive for rash. Negative for pallor and skin lesions.   Neurological:  Negative for dizziness, tremors, seizures, syncope, speech difficulty, weakness, memory problem and confusion.   Psychiatric/Behavioral:  Negative for self-injury and depressed mood. The patient is not nervous/anxious.        Objective   Vital Signs:   /60   Pulse 65   Temp 97.1 °F (36.2 °C) (Skin)   Ht 154.9 cm (60.98\")   Wt 80.9 kg (178 lb 6.4 oz)   SpO2 95%   BMI 33.73 kg/m²           Physical Exam  Vitals and nursing note reviewed.   Constitutional:       General: She is not in acute distress.     Appearance: Normal appearance. She is not toxic-appearing.   HENT:      Head: Atraumatic.      Right Ear: External ear normal.      Left Ear: External ear normal.      Nose: Nose normal.    "   Mouth/Throat:      Mouth: Mucous membranes are moist.   Eyes:      General:         Right eye: No discharge.         Left eye: No discharge.      Extraocular Movements: Extraocular movements intact.      Pupils: Pupils are equal, round, and reactive to light.   Cardiovascular:      Rate and Rhythm: Normal rate and regular rhythm.      Pulses: Normal pulses.      Heart sounds: Normal heart sounds. No murmur heard.     No gallop.   Pulmonary:      Effort: Pulmonary effort is normal. No respiratory distress.      Breath sounds: No wheezing, rhonchi or rales.   Abdominal:      General: There is no distension.      Palpations: Abdomen is soft. There is no mass.      Tenderness: There is no abdominal tenderness. There is no guarding.   Musculoskeletal:         General: No swelling or tenderness.      Cervical back: No tenderness.      Right lower leg: No edema.      Left lower leg: No edema.   Skin:     General: Skin is warm and dry.      Findings: No rash.   Neurological:      General: No focal deficit present.      Mental Status: She is alert and oriented to person, place, and time. Mental status is at baseline.      Motor: No weakness.      Gait: Gait normal.   Psychiatric:         Mood and Affect: Mood normal.         Thought Content: Thought content normal.          Result Review :   The following data was reviewed by: Savage Watts MD on 07/13/2021:                  Assessment and Plan    Diagnoses and all orders for this visit:    1. Type 2 diabetes mellitus without complication, without long-term current use of insulin (Primary)  Overview:  Previously on glipizide and Actos.  Weight down 75 lbs to 178 as of 11/24 = semaglutide.    Assessment & Plan:  A1c is staying down, she was 7.0 earlier this year, but has been 6.2 since and that is where she is as of 11/24.  Her nonfasting sugar was 130, she gets somewhere right around 100 fasting at home and that is where she was earlier in the year.  She is fine to  "continue full doses of semaglutide, Jardiance, and metformin.    Orders:  -     Hemoglobin A1c; Future    2. Stage 3a chronic kidney disease  Overview:  Teto was 52, creatinine 1.15.    Assessment & Plan:  This very mild, GFR is back up above 60 as of 11/24 OV, she is on ACE inhibitor and Jardiance, continue follow-up with just routine labs.    Orders:  -     Comprehensive Metabolic Panel; Future    3. Essential hypertension  Assessment & Plan:  Blood pressure remains well-controlled and her pulse is in the mid 60s as of her 11/24 OV.  She is on low-dose lisinopril and full dose atenolol, patient stable to continue same.      4. Vitamin D deficiency  Assessment & Plan:  Vitamin D is up to 70 as of 11/24, she made no changes, she is just getting this through her calcium tablet, stable to continue same.    Orders:  -     Vitamin D,25-Hydroxy; Future    5. Mixed hyperlipidemia  -     Lipid Panel; Future    6. Gastroesophageal reflux disease without esophagitis  Assessment & Plan:  Patient stable on low-dose PPI as of 11/24.  Her bone density is still basically low end of normal, that was in 1/24.  She had significant reflux in the past, so defer trial of every other day.                 Class 3 Severe Obesity (BMI >=40). Obesity-related health conditions include the following: hypertension, diabetes mellitus, dyslipidemias, GERD, and osteoarthritis. Obesity is worsening. BMI is is above average; BMI management plan is completed. We discussed portion control and increasing exercise.     --  --  OLDER NOTES:  VISIT 3/21:  ANNUAL PHYSICAL 2/20:  --  DM with A1C 7.8 on less than prescribed meds b/c was running low; d/w wt loss is goal here and call if low spells; to [place] as well to help with diet (micro-alb neg 1/17)...7.3 is good, but goal is 6.8 given age...7.4 \" b/c of my foot and I couldn't exercise\"; will see what Jardiance will cost and try to wean glipizide...6.8 is good drop and is down to 2/1 of glipizide, so " increase jardiance now and try 1/1 or 2/0 if drops again...7.7 despite increased Jardaince, so needs 2/1 again and/or get some wt off; agree with new monitor as well...7.2 and rec stay on this dose and diet please (d/w reason for wanting to get off glipizide)...7.4...is up due to being off feet for 6 weeks=8.0, so will try 45 mg actos if no worse swelling...7.1 and d/w yes, she can play with glipizide since having lows...7.3 in 6/20 is fine since only 1/2 dose glipizide now=ditto 9/20 = 7.2---> 7.2 is steady 3/21. (TSH neg 9/20).  OPTHO neg 2/21; MICROALB=  --  HTN remains well controlled. 3/21.  ? KGH4g=61% in 3/21 = no concerns yet.  --  LIPIDS at goal with LDL 69...67---> 88 in 9/20.  SPECT 7/16 with EF 50% and no ischemia (FH CHF=B MI at age 41).  --  FE DEF ANEMIA is up 11.5 to 12.4 and d/w stay on same MVI as of 5/17 OV...13.1 with lowish iron, stay on MVI...stable=defer a while after 2/19 OV---> all labs stable as of 9/20 = stay on MVI.  --  ELEVATED LFTS=wnl as of 1/17... ditto.  GERD w/o dysphagia on PPI and I rec trial of qod; (s/p prior dilation)  --  VIT D DEF=fine 9/20.  BMD neg per Dr Cuellar age 61 and she is scheduled for one soon as of her 10/21 office visit.  --  S/P FALL 10/17 = fell off stool, to [facility], had HCT, balance and paresthesia's since; exam non-focal, neg FTN, neg rhomberg; will get dopplers and review the cat scan, but o/w no tx rec...Dopplers 2/18 were fine; will send to ENT due to sxs with turning head...?  S/P FALL 7/19 = RLE major swelling, had VELE=neg; saw Ortho and they sent to PT...fell again, had MRI=R tibeal plateau fx and was in brace for 6 weeks per Dr Krishnan; has f/u with him before PT to resume; I d/w try replace aleve with tylenol arthritis.  --  MORBID [id number] is stuck (TSH neg 2/20).  --  --  MMG 1/17/2024 per Dr Mac's NP.  COLON 1/22 = polyp x1 = 5 years per Dr. Muir.    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    Follow Up   Return in about 6 months (around 5/5/2025).  Patient was given instructions and counseling regarding her condition or for health maintenance advice. Please see specific information pulled into the AVS if appropriate.

## 2024-11-05 NOTE — ASSESSMENT & PLAN NOTE
Blood pressure remains well-controlled and her pulse is in the mid 60s as of her 11/24 OV.  She is on low-dose lisinopril and full dose atenolol, patient stable to continue same.

## 2024-11-05 NOTE — ASSESSMENT & PLAN NOTE
This very mild, GFR is back up above 60 as of 11/24 OV, she is on ACE inhibitor and Jardiance, continue follow-up with just routine labs.

## 2024-11-05 NOTE — ASSESSMENT & PLAN NOTE
Patient stable on low-dose PPI as of 11/24.  Her bone density is still basically low end of normal, that was in 1/24.  She had significant reflux in the past, so defer trial of every other day.

## 2024-11-05 NOTE — ASSESSMENT & PLAN NOTE
A1c is staying down, she was 7.0 earlier this year, but has been 6.2 since and that is where she is as of 11/24.  Her nonfasting sugar was 130, she gets somewhere right around 100 fasting at home and that is where she was earlier in the year.  She is fine to continue full doses of semaglutide, Jardiance, and metformin.

## 2024-12-03 RX ORDER — SEMAGLUTIDE 2.68 MG/ML
INJECTION, SOLUTION SUBCUTANEOUS
Qty: 9 ML | Refills: 1 | Status: SHIPPED | OUTPATIENT
Start: 2024-12-03

## 2024-12-09 RX ORDER — SIMVASTATIN 40 MG
40 TABLET ORAL DAILY
Qty: 90 TABLET | Refills: 1 | Status: SHIPPED | OUTPATIENT
Start: 2024-12-09

## 2024-12-09 RX ORDER — EMPAGLIFLOZIN 25 MG/1
25 TABLET, FILM COATED ORAL DAILY
Qty: 90 TABLET | Refills: 1 | Status: SHIPPED | OUTPATIENT
Start: 2024-12-09

## 2024-12-09 RX ORDER — LISINOPRIL 10 MG/1
10 TABLET ORAL DAILY
Qty: 90 TABLET | Refills: 1 | Status: SHIPPED | OUTPATIENT
Start: 2024-12-09

## 2024-12-13 ENCOUNTER — TRANSCRIBE ORDERS (OUTPATIENT)
Dept: ADMINISTRATIVE | Facility: HOSPITAL | Age: 69
End: 2024-12-13
Payer: MEDICARE

## 2024-12-13 DIAGNOSIS — Z12.31 BREAST CANCER SCREENING BY MAMMOGRAM: Primary | ICD-10-CM

## 2024-12-17 RX ORDER — ATENOLOL 100 MG/1
100 TABLET ORAL DAILY
Qty: 90 TABLET | Refills: 1 | Status: SHIPPED | OUTPATIENT
Start: 2024-12-17

## 2025-02-09 NOTE — ASSESSMENT & PLAN NOTE
GFR has trended down a little to the low 50s as of 6/23.  This is likely related to some volume contraction given the rise in her BUN over the same timeframe.  We will watch this closely since she is requiring some nonsteroidals for left hip pain.   Patient arrives in wheelchair with complaint of drain leaking. Patient had emergency surgery on 1/23 for perforated marginal ulcer during which drains were placed. Was discharged last night. Reports she woke up today to blood around site. Endorses tenderness.

## 2025-02-17 ENCOUNTER — HOSPITAL ENCOUNTER (OUTPATIENT)
Dept: MAMMOGRAPHY | Facility: HOSPITAL | Age: 70
Discharge: HOME | End: 2025-02-17
Admitting: INTERNAL MEDICINE
Payer: MEDICARE

## 2025-02-17 DIAGNOSIS — Z12.31 BREAST CANCER SCREENING BY MAMMOGRAM: ICD-10-CM

## 2025-02-17 PROCEDURE — 77067 SCR MAMMO BI INCL CAD: CPT

## 2025-02-17 PROCEDURE — 77063 BREAST TOMOSYNTHESIS BI: CPT

## 2025-04-29 ENCOUNTER — LAB (OUTPATIENT)
Dept: LAB | Facility: HOSPITAL | Age: 70
End: 2025-04-29
Payer: MEDICARE

## 2025-04-29 DIAGNOSIS — E11.9 TYPE 2 DIABETES MELLITUS WITHOUT COMPLICATION, WITHOUT LONG-TERM CURRENT USE OF INSULIN: ICD-10-CM

## 2025-04-29 DIAGNOSIS — E55.9 VITAMIN D DEFICIENCY: ICD-10-CM

## 2025-04-29 DIAGNOSIS — E78.2 MIXED HYPERLIPIDEMIA: ICD-10-CM

## 2025-04-29 DIAGNOSIS — N18.31 STAGE 3A CHRONIC KIDNEY DISEASE: ICD-10-CM

## 2025-04-29 LAB
25(OH)D3 SERPL-MCNC: 86.2 NG/ML (ref 30–100)
ALBUMIN SERPL-MCNC: 4.3 G/DL (ref 3.5–5.2)
ALBUMIN/GLOB SERPL: 1.5 G/DL
ALP SERPL-CCNC: 82 U/L (ref 39–117)
ALT SERPL W P-5'-P-CCNC: 15 U/L (ref 1–33)
ANION GAP SERPL CALCULATED.3IONS-SCNC: 12 MMOL/L (ref 5–15)
AST SERPL-CCNC: 21 U/L (ref 1–32)
BILIRUB SERPL-MCNC: 0.5 MG/DL (ref 0–1.2)
BUN SERPL-MCNC: 15 MG/DL (ref 8–23)
BUN/CREAT SERPL: 15.6 (ref 7–25)
CALCIUM SPEC-SCNC: 9.8 MG/DL (ref 8.6–10.5)
CHLORIDE SERPL-SCNC: 97 MMOL/L (ref 98–107)
CHOLEST SERPL-MCNC: 157 MG/DL (ref 0–200)
CO2 SERPL-SCNC: 26 MMOL/L (ref 22–29)
CREAT SERPL-MCNC: 0.96 MG/DL (ref 0.57–1)
EGFRCR SERPLBLD CKD-EPI 2021: 64.6 ML/MIN/1.73
GLOBULIN UR ELPH-MCNC: 2.8 GM/DL
GLUCOSE SERPL-MCNC: 94 MG/DL (ref 65–99)
HBA1C MFR BLD: 5.9 % (ref 4.8–5.6)
HDLC SERPL-MCNC: 58 MG/DL (ref 40–60)
LDLC SERPL CALC-MCNC: 75 MG/DL (ref 0–100)
LDLC/HDLC SERPL: 1.23 {RATIO}
POTASSIUM SERPL-SCNC: 4.5 MMOL/L (ref 3.5–5.2)
PROT SERPL-MCNC: 7.1 G/DL (ref 6–8.5)
SODIUM SERPL-SCNC: 135 MMOL/L (ref 136–145)
TRIGL SERPL-MCNC: 137 MG/DL (ref 0–150)
VLDLC SERPL-MCNC: 24 MG/DL (ref 5–40)

## 2025-04-29 PROCEDURE — 80053 COMPREHEN METABOLIC PANEL: CPT

## 2025-04-29 PROCEDURE — 83036 HEMOGLOBIN GLYCOSYLATED A1C: CPT

## 2025-04-29 PROCEDURE — 80061 LIPID PANEL: CPT

## 2025-04-29 PROCEDURE — 82306 VITAMIN D 25 HYDROXY: CPT

## 2025-04-29 PROCEDURE — 36415 COLL VENOUS BLD VENIPUNCTURE: CPT

## 2025-05-05 ENCOUNTER — OFFICE VISIT (OUTPATIENT)
Age: 70
End: 2025-05-05
Payer: MEDICARE

## 2025-05-05 VITALS
WEIGHT: 177 LBS | OXYGEN SATURATION: 98 % | SYSTOLIC BLOOD PRESSURE: 122 MMHG | BODY MASS INDEX: 33.42 KG/M2 | DIASTOLIC BLOOD PRESSURE: 76 MMHG | HEIGHT: 61 IN | HEART RATE: 67 BPM

## 2025-05-05 DIAGNOSIS — F51.01 PRIMARY INSOMNIA: ICD-10-CM

## 2025-05-05 DIAGNOSIS — E11.9 TYPE 2 DIABETES MELLITUS WITHOUT COMPLICATION, WITHOUT LONG-TERM CURRENT USE OF INSULIN: ICD-10-CM

## 2025-05-05 DIAGNOSIS — E55.9 VITAMIN D DEFICIENCY: ICD-10-CM

## 2025-05-05 DIAGNOSIS — N18.31 STAGE 3A CHRONIC KIDNEY DISEASE: ICD-10-CM

## 2025-05-05 DIAGNOSIS — I10 ESSENTIAL HYPERTENSION: Primary | ICD-10-CM

## 2025-05-05 DIAGNOSIS — E78.2 MIXED HYPERLIPIDEMIA: ICD-10-CM

## 2025-05-05 RX ORDER — ATENOLOL 100 MG/1
100 TABLET ORAL DAILY
Qty: 90 TABLET | Refills: 1 | Status: SHIPPED | OUTPATIENT
Start: 2025-05-05

## 2025-05-05 RX ORDER — LISINOPRIL 10 MG/1
10 TABLET ORAL DAILY
Qty: 90 TABLET | Refills: 1 | Status: SHIPPED | OUTPATIENT
Start: 2025-05-05

## 2025-05-05 RX ORDER — OMEPRAZOLE 20 MG/1
20 CAPSULE, DELAYED RELEASE ORAL DAILY
Qty: 90 CAPSULE | Refills: 1 | Status: SHIPPED | OUTPATIENT
Start: 2025-05-05

## 2025-05-05 RX ORDER — TRAZODONE HYDROCHLORIDE 50 MG/1
50 TABLET ORAL NIGHTLY
Qty: 90 TABLET | Refills: 1 | Status: SHIPPED | OUTPATIENT
Start: 2025-05-05

## 2025-05-05 RX ORDER — SIMVASTATIN 40 MG
40 TABLET ORAL DAILY
Qty: 90 TABLET | Refills: 1 | Status: SHIPPED | OUTPATIENT
Start: 2025-05-05

## 2025-05-05 RX ORDER — SEMAGLUTIDE 2.68 MG/ML
2 INJECTION, SOLUTION SUBCUTANEOUS WEEKLY
Qty: 9 ML | Refills: 1 | Status: SHIPPED | OUTPATIENT
Start: 2025-05-05

## 2025-05-05 NOTE — ASSESSMENT & PLAN NOTE
LD L of 75 is at goal for primary prevention, patient is on moderate dose simvastatin, stable to continue same.

## 2025-05-05 NOTE — ASSESSMENT & PLAN NOTE
Patient with issues falling and staying asleep, but needs to be able to hear her mom in the middle of the night, was intolerant to melatonin in regards to dream changes, so we will try very low-dose trazodone.    He actually was on this previously by Judy, was on 50 mg, and was able to wean off of it after she retired.  She is aware to take it about 90 minutes prior to sleep.

## 2025-05-05 NOTE — ASSESSMENT & PLAN NOTE
Patient's blood pressure is well-controlled and her pulse is in the mid 60s as of her 5/25 OV.  She is on full dose atenolol and low-dose lisinopril, stable to continue same.

## 2025-05-05 NOTE — ASSESSMENT & PLAN NOTE
His GFR is remaining above 60 still as of her 5/25 OV, we are just monitoring this with routine labs.  She is already on full dose Jardiance and low-dose lisinopril.

## 2025-05-05 NOTE — ASSESSMENT & PLAN NOTE
Patient's A1c has trended down to 5.9 as of her 5/25 OV, she is on full doses of Jardiance, metformin, and Ozempic.    This is obviously excellent control, she is not having any lows spells, so we could stick with current regimen, but we will see how she does on just half dose of the metformin.

## 2025-05-05 NOTE — ASSESSMENT & PLAN NOTE
Vitamin D is up from 70 to 86 as of her 5/25 OV.  She is just getting this through her calcium supplement, if she goes up again we will have her switch things.

## 2025-05-05 NOTE — PROGRESS NOTES
"Chief Complaint  Hypertension, Follow-up (Pt had labs, she states that this is routine. ), and Insomnia (Pt states that she is having trouble going to sleep and then staying asleep as well. She states that she can't turn her mind off. )    Subjective          Ashwini Lopez presents to [facility]     History of Present Illness:  Patient is a 69-year-old female with underlying diabetes mellitus on oral agents, hypertension, hyperlipidemia, among others, who is here 5/25 for routine 4-6-month follow-up.  We will review her med list, go over her labs in detail, and address any new concerns she may have.    Review of Systems   Constitutional:  Negative for appetite change, fatigue, fever and unexpected weight loss.   HENT:  Negative for congestion, ear pain, rhinorrhea and sore throat.    Eyes:  Negative for blurred vision and pain.   Respiratory:  Negative for cough, chest tightness, shortness of breath and wheezing.    Cardiovascular:  Negative for chest pain, palpitations and leg swelling.   Gastrointestinal:  Negative for abdominal pain, diarrhea and vomiting.   Endocrine: Negative for polydipsia, polyphagia and polyuria.   Genitourinary:  Negative for difficulty urinating, dysuria and hematuria.   Musculoskeletal:  Negative for arthralgias and gait problem.   Skin:  Positive for rash. Negative for pallor and skin lesions.   Neurological:  Negative for dizziness, tremors, seizures, syncope, speech difficulty, weakness, memory problem and confusion.   Psychiatric/Behavioral:  Negative for self-injury and depressed mood. The patient is not nervous/anxious.        Objective   Vital Signs:   /76   Pulse 67   Ht 154.9 cm (60.98\")   Wt 80.3 kg (177 lb)   SpO2 98%   BMI 33.46 kg/m²           Physical Exam  Vitals and nursing note reviewed.   Constitutional:       General: She is not in acute distress.     Appearance: Normal appearance. She is not toxic-appearing.   HENT:      " Head: Atraumatic.      Right Ear: External ear normal.      Left Ear: External ear normal.      Nose: Nose normal.      Mouth/Throat:      Mouth: Mucous membranes are moist.   Eyes:      General:         Right eye: No discharge.         Left eye: No discharge.      Extraocular Movements: Extraocular movements intact.      Pupils: Pupils are equal, round, and reactive to light.   Cardiovascular:      Rate and Rhythm: Normal rate and regular rhythm.      Pulses: Normal pulses.      Heart sounds: Normal heart sounds. No murmur heard.     No gallop.   Pulmonary:      Effort: Pulmonary effort is normal. No respiratory distress.      Breath sounds: No wheezing, rhonchi or rales.   Abdominal:      General: There is no distension.      Palpations: Abdomen is soft. There is no mass.      Tenderness: There is no abdominal tenderness. There is no guarding.   Musculoskeletal:         General: No swelling or tenderness.      Cervical back: No tenderness.      Right lower leg: No edema.      Left lower leg: No edema.   Skin:     General: Skin is warm and dry.      Findings: No rash.   Neurological:      General: No focal deficit present.      Mental Status: She is alert and oriented to person, place, and time. Mental status is at baseline.      Motor: No weakness.      Gait: Gait normal.   Psychiatric:         Mood and Affect: Mood normal.         Thought Content: Thought content normal.          Result Review :   The following data was reviewed by: Savage Watts MD on 07/13/2021:                  Assessment and Plan    Diagnoses and all orders for this visit:    1. Essential hypertension (Primary)  Assessment & Plan:  Patient's blood pressure is well-controlled and her pulse is in the mid 60s as of her 5/25 OV.  She is on full dose atenolol and low-dose lisinopril, stable to continue same.    Orders:  -     TSH; Future  -     T4, free; Future    2. Type 2 diabetes mellitus without complication, without long-term current use of  insulin  Overview:  Previously on glipizide and Actos.  Weight down 75 lbs to 178 as of 11/24 = semaglutide.    Assessment & Plan:  Patient's A1c has trended down to 5.9 as of her 5/25 OV, she is on full doses of Jardiance, metformin, and Ozempic.    This is obviously excellent control, she is not having any lows spells, so we could stick with current regimen, but we will see how she does on just half dose of the metformin.    Orders:  -     Microalbumin / Creatinine Urine Ratio - Urine, Clean Catch; Future  -     Hemoglobin A1c; Future    3. Stage 3a chronic kidney disease  Overview:  Teto was 52, creatinine 1.15.    Assessment & Plan:  His GFR is remaining above 60 still as of her 5/25 OV, we are just monitoring this with routine labs.  She is already on full dose Jardiance and low-dose lisinopril.    Orders:  -     Comprehensive Metabolic Panel; Future  -     CBC & Differential; Future    4. Mixed hyperlipidemia  Assessment & Plan:  LD L of 75 is at goal for primary prevention, patient is on moderate dose simvastatin, stable to continue same.    Orders:  -     Lipid Panel; Future    5. Vitamin D deficiency  Assessment & Plan:  Vitamin D is up from 70 to 86 as of her 5/25 OV.  She is just getting this through her calcium supplement, if she goes up again we will have her switch things.    Orders:  -     Vitamin D,25-Hydroxy; Future    6. Primary insomnia  Assessment & Plan:  Patient with issues falling and staying asleep, but needs to be able to hear her mom in the middle of the night, was intolerant to melatonin in regards to dream changes, so we will try very low-dose trazodone.    He actually was on this previously by Judy, was on 50 mg, and was able to wean off of it after she retired.  She is aware to take it about 90 minutes prior to sleep.      Other orders  -     atenolol (TENORMIN) 100 MG tablet; Take 1 tablet by mouth Daily.  Dispense: 90 tablet; Refill: 1  -     empagliflozin (Jardiance) 25 MG tablet  "tablet; Take 1 tablet by mouth Daily.  Dispense: 90 tablet; Refill: 1  -     lisinopril (PRINIVIL,ZESTRIL) 10 MG tablet; Take 1 tablet by mouth Daily.  Dispense: 90 tablet; Refill: 1  -     metFORMIN (GLUCOPHAGE) 1000 MG tablet; Take 1 tablet by mouth 2 (Two) Times a Day.  Dispense: 180 tablet; Refill: 1  -     omeprazole (priLOSEC) 20 MG capsule; Take 1 capsule by mouth Daily.  Dispense: 90 capsule; Refill: 1  -     Semaglutide, 2 MG/DOSE, (Ozempic, 2 MG/DOSE,) 8 MG/3ML solution pen-injector; Inject 2 mg under the skin into the appropriate area as directed 1 (One) Time Per Week.  Dispense: 9 mL; Refill: 1  -     simvastatin (ZOCOR) 40 MG tablet; Take 1 tablet by mouth Daily.  Dispense: 90 tablet; Refill: 1  -     traZODone (DESYREL) 50 MG tablet; Take 1 tablet by mouth Every Night.  Dispense: 90 tablet; Refill: 1        Class 3 Severe Obesity (BMI >=40). Obesity-related health conditions include the following: hypertension, diabetes mellitus, dyslipidemias, GERD, and osteoarthritis. Obesity is worsening. BMI is is above average; BMI management plan is completed. We discussed portion control and increasing exercise.     --  --  OLDER NOTES:  VISIT 3/21:  ANNUAL PHYSICAL 2/20:  --  DM with A1C 7.8 on less than prescribed meds b/c was running low; d/w wt loss is goal here and call if low spells; to [place] as well to help with diet (micro-alb neg 1/17)...7.3 is good, but goal is 6.8 given age...7.4 \" b/c of my foot and I couldn't exercise\"; will see what Jardiance will cost and try to wean glipizide...6.8 is good drop and is down to 2/1 of glipizide, so increase jardiance now and try 1/1 or 2/0 if drops again...7.7 despite increased Jardaince, so needs 2/1 again and/or get some wt off; agree with new monitor as well...7.2 and rec stay on this dose and diet please (d/w reason for wanting to get off glipizide)...7.4...is up due to being off feet for 6 weeks=8.0, so will try 45 mg actos if no worse swelling...7.1 and d/w yes, " she can play with glipizide since having lows...7.3 in 6/20 is fine since only 1/2 dose glipizide now=ditto 9/20 = 7.2---> 7.2 is steady 3/21. (TSH neg 9/20).  OPTHO neg 2/21; MICROALB=  --  HTN remains well controlled. 3/21.  ? BPP8n=73% in 3/21 = no concerns yet.  --  LIPIDS at goal with LDL 69...67---> 88 in 9/20.  SPECT 7/16 with EF 50% and no ischemia (FH CHF=B MI at age 41).  --  FE DEF ANEMIA is up 11.5 to 12.4 and d/w stay on same MVI as of 5/17 OV...13.1 with lowish iron, stay on MVI...stable=defer a while after 2/19 OV---> all labs stable as of 9/20 = stay on MVI.  --  ELEVATED LFTS=wnl as of 1/17... ditto.  GERD w/o dysphagia on PPI and I rec trial of qod; (s/p prior dilation)  --  VIT D DEF=fine 9/20.  BMD neg per Dr Cuellar age 61 and she is scheduled for one soon as of her 10/21 office visit.  --  S/P FALL 10/17 = fell off stool, to [facility], had HCT, balance and paresthesia's since; exam non-focal, neg FTN, neg rhomberg; will get dopplers and review the cat scan, but o/w no tx rec...Dopplers 2/18 were fine; will send to ENT due to sxs with turning head...?  S/P FALL 7/19 = RLE major swelling, had VELE=neg; saw Ortho and they sent to PT...fell again, had MRI=R tibeal plateau fx and was in brace for 6 weeks per Dr Krishnan; has f/u with him before PT to resume; I d/w try replace aleve with tylenol arthritis.  --  MORBID EKULTCE=839 is stuck (TSH neg 2/20).  --  --  MMG 1/17/2024 per Dr Mac's NP.  COLON 1/22 = polyp x1 = 5 years per Dr. Muir.    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    Follow Up   Return in about 4 months (around 9/5/2025).  Patient was given instructions and counseling regarding her condition or for health maintenance advice. Please see specific information pulled into the AVS if appropriate.

## 2025-07-30 ENCOUNTER — OFFICE VISIT (OUTPATIENT)
Dept: OBSTETRICS AND GYNECOLOGY | Age: 70
End: 2025-07-30
Payer: MEDICARE

## 2025-07-30 VITALS
WEIGHT: 174.4 LBS | BODY MASS INDEX: 32.93 KG/M2 | DIASTOLIC BLOOD PRESSURE: 65 MMHG | SYSTOLIC BLOOD PRESSURE: 110 MMHG | HEIGHT: 61 IN | HEART RATE: 74 BPM

## 2025-07-30 DIAGNOSIS — Z01.419 ENCOUNTER FOR GYNECOLOGICAL EXAMINATION WITHOUT ABNORMAL FINDING: Primary | ICD-10-CM

## 2025-07-30 DIAGNOSIS — Z12.4 ENCOUNTER FOR PAPANICOLAOU SMEAR FOR CERVICAL CANCER SCREENING: ICD-10-CM

## 2025-07-30 PROCEDURE — G0123 SCREEN CERV/VAG THIN LAYER: HCPCS | Performed by: OBSTETRICS & GYNECOLOGY

## 2025-07-30 NOTE — PROGRESS NOTES
"Well Woman Visit    CC: Annual well woman exam       HPI:   70 y.o. Contraception or HRT: Post menopausal  Menses:  none  Pain:  None  Incontinence concerns: No  Hx of abnormal pap:  No  Pt has no complaints today.      History: PMHx, Meds, Allergies, PSHx, Social Hx, and POBHx all reviewed and updated.      PHYSICAL EXAM:  /65   Pulse 74   Ht 154.9 cm (60.98\")   Wt 79.1 kg (174 lb 6.4 oz)   BMI 32.97 kg/m²   General- NAD, alert and oriented, appropriate  Psych- Normal mood, good memory  Neck- No masses, no thyroid enlargement  CV- Regular rhythm, no murnurs  Resp- CTA to bases, no wheezes  Abdomen- Soft, non distended, non tender, no masses    Breast left-  Bilaterally symmetrical, no masses, non tender, no nipple discharge  Breast right- Bilaterally symmetrical, no masses, non tender, no nipple discharge    External genitalia- Normal female, no lesions  Urethra/meatus- Normal, no masses, non tender, no prolapse  Bladder- Normal, no masses, non tender, no prolapse  Vagina- Normal, no atrophy, no lesions, no discharge, no prolapse  Cvx- Normal, no lesions, no discharge, No cervical motion tenderness, stenotic cervix  Uterus- Normal size, shape & consistency.  Non tender, mobile.  Adnexa- No mass, non tender  Anus/Rectum/Perineum- Normal appearance, no mass, good sphincter tone, no hemorrhoids, no prolapse , Hemoccult neg    Lymphatic- No palpable neck, axillary, or groin nodes  Ext- No edema, no cyanosis    Skin- No lesions, no rashes, no acanthosis nigricans        ASSESSMENT and PLAN:  WWE    Diagnoses and all orders for this visit:    1. Encounter for gynecological examination without abnormal finding (Primary)  -     POC Occult Blood Stool  -     IGP,rfx Aptima HPV All Pth    2. Encounter for Papanicolaou smear for cervical cancer screening  -     IGP,rfx Aptima HPV All Pth    Pt desires pap this year.     Domestic violence/abuse screen: negative    Depression screen: no " SI    Preventative:   BREAST HEALTH- Monthly self breast exam importance and how to reviewed. MMG and/or MRI (prn) reviewed per society guidelines and her individual history. Mammo/MRI screen: Already up to date.  CERVICAL CANCER Screening- Reviewed current ASCCP guidelines for screening w and wo cotest HPV, age specific.  Screen: Updated today.  COLON CANCER Screening- Reviewed current medical society guidelines and options.  Colonoscopy screen:  Already up to date.  SEXUAL HEALTH: Declines STD screening.  BONE HEALTH- Reviewed current medical society guidelines and options for testing, calcium and vit D intake.  Weight bearing exercise.  DEXA: Already up to date.  VACCINATIONS Recommended: Flu vaccine annually, Zoster vaccine (51yo and older), Pneumococcal vaccine (51yo and older).  Importance discussed, risk being unvaccinated reviewed.  Questions answered  Smoking status- NON SMOKER.  Importance of avoiding second hand smoke.  Follow up PCP/Specialist PMHx and Labs  [facility] : does not qualify      She understands the importance of having any ordered tests to be performed in a timely fashion.  She is encouraged to review her results online and/or contact or office if she has questions.     Follow Up:  Return in about 1 year (around 7/30/2026) for Annual physical.      Mala Samano, APRN  07/30/2025

## 2025-08-05 LAB
CONV .: NORMAL
CYTOLOGIST CVX/VAG CYTO: NORMAL
CYTOLOGY CVX/VAG DOC CYTO: NORMAL
CYTOLOGY CVX/VAG DOC THIN PREP: NORMAL
DX ICD CODE: NORMAL
OTHER STN SPEC: NORMAL
SERVICE CMNT-IMP: NORMAL
STAT OF ADQ CVX/VAG CYTO-IMP: NORMAL

## (undated) DEVICE — Device: Brand: DEFENDO AIR/WATER/SUCTION AND BIOPSY VALVE

## (undated) DEVICE — SINGLE-USE BIOPSY FORCEPS: Brand: RADIAL JAW 4

## (undated) DEVICE — THE SINGLE USE ETRAP – POLYP TRAP IS USED FOR SUCTION RETRIEVAL OF ENDOSCOPICALLY REMOVED POLYPS.: Brand: ETRAP

## (undated) DEVICE — SNAR E/S POLYP SNAREMASTER OVL/10MM 2.8X2300MM YEL

## (undated) DEVICE — SOL IRRG H2O PL/BG 1000ML STRL

## (undated) DEVICE — EGD OR ERCP KIT: Brand: MEDLINE INDUSTRIES, INC.

## (undated) DEVICE — COLON KIT: Brand: MEDLINE INDUSTRIES, INC.